# Patient Record
Sex: MALE | Race: ASIAN | NOT HISPANIC OR LATINO | URBAN - METROPOLITAN AREA
[De-identification: names, ages, dates, MRNs, and addresses within clinical notes are randomized per-mention and may not be internally consistent; named-entity substitution may affect disease eponyms.]

---

## 2021-01-21 ENCOUNTER — OUTPATIENT (OUTPATIENT)
Dept: OUTPATIENT SERVICES | Facility: HOSPITAL | Age: 66
LOS: 1 days | End: 2021-01-21
Payer: MEDICAID

## 2021-01-21 DIAGNOSIS — Z20.828 CONTACT WITH AND (SUSPECTED) EXPOSURE TO OTHER VIRAL COMMUNICABLE DISEASES: ICD-10-CM

## 2021-01-21 LAB — SARS-COV-2 RNA SPEC QL NAA+PROBE: DETECTED

## 2021-01-21 PROCEDURE — C9803: CPT

## 2021-01-21 PROCEDURE — U0005: CPT

## 2021-01-21 PROCEDURE — U0003: CPT

## 2021-01-22 DIAGNOSIS — Z20.828 CONTACT WITH AND (SUSPECTED) EXPOSURE TO OTHER VIRAL COMMUNICABLE DISEASES: ICD-10-CM

## 2021-02-14 ENCOUNTER — OUTPATIENT (OUTPATIENT)
Dept: OUTPATIENT SERVICES | Facility: HOSPITAL | Age: 66
LOS: 1 days | End: 2021-02-14
Payer: MEDICAID

## 2021-02-14 DIAGNOSIS — Z20.828 CONTACT WITH AND (SUSPECTED) EXPOSURE TO OTHER VIRAL COMMUNICABLE DISEASES: ICD-10-CM

## 2021-02-14 LAB — SARS-COV-2 RNA SPEC QL NAA+PROBE: DETECTED

## 2021-02-14 PROCEDURE — C9803: CPT

## 2021-02-14 PROCEDURE — U0003: CPT

## 2021-02-14 PROCEDURE — U0005: CPT

## 2021-02-15 DIAGNOSIS — Z20.828 CONTACT WITH AND (SUSPECTED) EXPOSURE TO OTHER VIRAL COMMUNICABLE DISEASES: ICD-10-CM

## 2021-02-21 ENCOUNTER — OUTPATIENT (OUTPATIENT)
Dept: OUTPATIENT SERVICES | Facility: HOSPITAL | Age: 66
LOS: 1 days | End: 2021-02-21
Payer: MEDICAID

## 2021-02-21 DIAGNOSIS — Z20.828 CONTACT WITH AND (SUSPECTED) EXPOSURE TO OTHER VIRAL COMMUNICABLE DISEASES: ICD-10-CM

## 2021-02-21 LAB — SARS-COV-2 RNA SPEC QL NAA+PROBE: SIGNIFICANT CHANGE UP

## 2021-02-21 PROCEDURE — C9803: CPT

## 2021-02-21 PROCEDURE — U0003: CPT

## 2021-02-21 PROCEDURE — U0005: CPT

## 2021-02-22 DIAGNOSIS — Z20.828 CONTACT WITH AND (SUSPECTED) EXPOSURE TO OTHER VIRAL COMMUNICABLE DISEASES: ICD-10-CM

## 2021-03-07 ENCOUNTER — OUTPATIENT (OUTPATIENT)
Dept: OUTPATIENT SERVICES | Facility: HOSPITAL | Age: 66
LOS: 1 days | End: 2021-03-07
Payer: MEDICAID

## 2021-03-07 DIAGNOSIS — Z20.828 CONTACT WITH AND (SUSPECTED) EXPOSURE TO OTHER VIRAL COMMUNICABLE DISEASES: ICD-10-CM

## 2021-03-07 LAB — SARS-COV-2 RNA SPEC QL NAA+PROBE: SIGNIFICANT CHANGE UP

## 2021-03-07 PROCEDURE — U0003: CPT

## 2021-03-07 PROCEDURE — C9803: CPT

## 2021-03-07 PROCEDURE — U0005: CPT

## 2021-03-08 DIAGNOSIS — Z20.828 CONTACT WITH AND (SUSPECTED) EXPOSURE TO OTHER VIRAL COMMUNICABLE DISEASES: ICD-10-CM

## 2022-02-17 ENCOUNTER — INPATIENT (INPATIENT)
Facility: HOSPITAL | Age: 67
LOS: 4 days | Discharge: HOME | DRG: 673 | End: 2022-02-22
Attending: INTERNAL MEDICINE | Admitting: INTERNAL MEDICINE
Payer: MEDICAID

## 2022-02-17 VITALS
HEART RATE: 82 BPM | RESPIRATION RATE: 18 BRPM | DIASTOLIC BLOOD PRESSURE: 99 MMHG | WEIGHT: 127.87 LBS | TEMPERATURE: 99 F | OXYGEN SATURATION: 99 % | SYSTOLIC BLOOD PRESSURE: 198 MMHG

## 2022-02-17 DIAGNOSIS — I10 ESSENTIAL (PRIMARY) HYPERTENSION: ICD-10-CM

## 2022-02-17 DIAGNOSIS — E11.9 TYPE 2 DIABETES MELLITUS WITHOUT COMPLICATIONS: ICD-10-CM

## 2022-02-17 DIAGNOSIS — N18.6 END STAGE RENAL DISEASE: ICD-10-CM

## 2022-02-17 DIAGNOSIS — Z29.9 ENCOUNTER FOR PROPHYLACTIC MEASURES, UNSPECIFIED: ICD-10-CM

## 2022-02-17 LAB
ALBUMIN SERPL ELPH-MCNC: 3.4 G/DL — SIGNIFICANT CHANGE UP (ref 3.3–5)
ALBUMIN SERPL ELPH-MCNC: 3.4 G/DL — SIGNIFICANT CHANGE UP (ref 3.3–5)
ALP SERPL-CCNC: 113 U/L — SIGNIFICANT CHANGE UP (ref 40–120)
ALT FLD-CCNC: 11 U/L — LOW (ref 12–78)
ANION GAP SERPL CALC-SCNC: 9 MMOL/L — SIGNIFICANT CHANGE UP (ref 5–17)
APPEARANCE UR: CLEAR — SIGNIFICANT CHANGE UP
APTT BLD: 34.4 SEC — SIGNIFICANT CHANGE UP (ref 27.5–35.5)
AST SERPL-CCNC: 8 U/L — LOW (ref 15–37)
BASOPHILS # BLD AUTO: 0.05 K/UL — SIGNIFICANT CHANGE UP (ref 0–0.2)
BASOPHILS NFR BLD AUTO: 0.3 % — SIGNIFICANT CHANGE UP (ref 0–2)
BILIRUB SERPL-MCNC: 0.4 MG/DL — SIGNIFICANT CHANGE UP (ref 0.2–1.2)
BILIRUB UR-MCNC: NEGATIVE — SIGNIFICANT CHANGE UP
BUN SERPL-MCNC: 51 MG/DL — HIGH (ref 7–23)
CALCIUM SERPL-MCNC: 9.3 MG/DL — SIGNIFICANT CHANGE UP (ref 8.5–10.1)
CALCIUM SERPL-MCNC: 9.6 MG/DL — SIGNIFICANT CHANGE UP (ref 8.4–10.5)
CHLORIDE SERPL-SCNC: 106 MMOL/L — SIGNIFICANT CHANGE UP (ref 96–108)
CO2 SERPL-SCNC: 21 MMOL/L — LOW (ref 22–31)
COLOR SPEC: YELLOW — SIGNIFICANT CHANGE UP
CREAT SERPL-MCNC: 8.4 MG/DL — HIGH (ref 0.5–1.3)
DIFF PNL FLD: ABNORMAL
EOSINOPHIL # BLD AUTO: 0.27 K/UL — SIGNIFICANT CHANGE UP (ref 0–0.5)
EOSINOPHIL NFR BLD AUTO: 1.8 % — SIGNIFICANT CHANGE UP (ref 0–6)
FERRITIN SERPL-MCNC: 522 NG/ML — HIGH (ref 30–400)
GLUCOSE SERPL-MCNC: 148 MG/DL — HIGH (ref 70–99)
GLUCOSE UR QL: 50 MG/DL
HCT VFR BLD CALC: 31.4 % — LOW (ref 39–50)
HGB BLD-MCNC: 10 G/DL — LOW (ref 13–17)
IMM GRANULOCYTES NFR BLD AUTO: 0.7 % — SIGNIFICANT CHANGE UP (ref 0–1.5)
INR BLD: 1.04 RATIO — SIGNIFICANT CHANGE UP (ref 0.88–1.16)
KETONES UR-MCNC: NEGATIVE — SIGNIFICANT CHANGE UP
LEUKOCYTE ESTERASE UR-ACNC: ABNORMAL
LYMPHOCYTES # BLD AUTO: 22.7 % — SIGNIFICANT CHANGE UP (ref 13–44)
LYMPHOCYTES # BLD AUTO: 3.37 K/UL — HIGH (ref 1–3.3)
MAGNESIUM SERPL-MCNC: 3.7 MG/DL — HIGH (ref 1.6–2.6)
MCHC RBC-ENTMCNC: 29.2 PG — SIGNIFICANT CHANGE UP (ref 27–34)
MCHC RBC-ENTMCNC: 31.8 GM/DL — LOW (ref 32–36)
MCV RBC AUTO: 91.8 FL — SIGNIFICANT CHANGE UP (ref 80–100)
MONOCYTES # BLD AUTO: 1.24 K/UL — HIGH (ref 0–0.9)
MONOCYTES NFR BLD AUTO: 8.4 % — SIGNIFICANT CHANGE UP (ref 2–14)
NEUTROPHILS # BLD AUTO: 9.79 K/UL — HIGH (ref 1.8–7.4)
NEUTROPHILS NFR BLD AUTO: 66.1 % — SIGNIFICANT CHANGE UP (ref 43–77)
NITRITE UR-MCNC: NEGATIVE — SIGNIFICANT CHANGE UP
NRBC # BLD: 0 /100 WBCS — SIGNIFICANT CHANGE UP (ref 0–0)
PH UR: 6 — SIGNIFICANT CHANGE UP (ref 5–8)
PHOSPHATE SERPL-MCNC: 4.8 MG/DL — HIGH (ref 2.5–4.5)
PLATELET # BLD AUTO: 479 K/UL — HIGH (ref 150–400)
POTASSIUM SERPL-MCNC: 5.7 MMOL/L — HIGH (ref 3.5–5.3)
POTASSIUM SERPL-SCNC: 5.7 MMOL/L — HIGH (ref 3.5–5.3)
PROT SERPL-MCNC: 8.4 G/DL — HIGH (ref 6–8.3)
PROT UR-MCNC: 100
PROTHROM AB SERPL-ACNC: 12.1 SEC — SIGNIFICANT CHANGE UP (ref 10.6–13.6)
PTH-INTACT FLD-MCNC: 56 PG/ML — SIGNIFICANT CHANGE UP (ref 15–65)
RBC # BLD: 3.42 M/UL — LOW (ref 4.2–5.8)
RBC # FLD: 16.8 % — HIGH (ref 10.3–14.5)
SARS-COV-2 RNA SPEC QL NAA+PROBE: SIGNIFICANT CHANGE UP
SODIUM SERPL-SCNC: 136 MMOL/L — SIGNIFICANT CHANGE UP (ref 135–145)
SP GR SPEC: 1.01 — SIGNIFICANT CHANGE UP (ref 1.01–1.02)
UROBILINOGEN FLD QL: NEGATIVE — SIGNIFICANT CHANGE UP
WBC # BLD: 14.83 K/UL — HIGH (ref 3.8–10.5)
WBC # FLD AUTO: 14.83 K/UL — HIGH (ref 3.8–10.5)

## 2022-02-17 PROCEDURE — 77001 FLUOROGUIDE FOR VEIN DEVICE: CPT | Mod: 26

## 2022-02-17 PROCEDURE — 76937 US GUIDE VASCULAR ACCESS: CPT | Mod: 26

## 2022-02-17 PROCEDURE — 99285 EMERGENCY DEPT VISIT HI MDM: CPT

## 2022-02-17 PROCEDURE — 71046 X-RAY EXAM CHEST 2 VIEWS: CPT | Mod: 26

## 2022-02-17 PROCEDURE — 76770 US EXAM ABDO BACK WALL COMP: CPT | Mod: 26

## 2022-02-17 PROCEDURE — 93010 ELECTROCARDIOGRAM REPORT: CPT

## 2022-02-17 PROCEDURE — 93970 EXTREMITY STUDY: CPT | Mod: 26

## 2022-02-17 PROCEDURE — 36556 INSERT NON-TUNNEL CV CATH: CPT

## 2022-02-17 RX ORDER — INSULIN LISPRO 100/ML
VIAL (ML) SUBCUTANEOUS EVERY 6 HOURS
Refills: 0 | Status: DISCONTINUED | OUTPATIENT
Start: 2022-02-17 | End: 2022-02-17

## 2022-02-17 RX ORDER — ONDANSETRON 8 MG/1
4 TABLET, FILM COATED ORAL EVERY 8 HOURS
Refills: 0 | Status: DISCONTINUED | OUTPATIENT
Start: 2022-02-17 | End: 2022-02-22

## 2022-02-17 RX ORDER — SODIUM CHLORIDE 9 MG/ML
1000 INJECTION, SOLUTION INTRAVENOUS
Refills: 0 | Status: DISCONTINUED | OUTPATIENT
Start: 2022-02-17 | End: 2022-02-22

## 2022-02-17 RX ORDER — POLYETHYLENE GLYCOL 3350 17 G/17G
17 POWDER, FOR SOLUTION ORAL
Refills: 0 | Status: DISCONTINUED | OUTPATIENT
Start: 2022-02-17 | End: 2022-02-22

## 2022-02-17 RX ORDER — DEXTROSE 50 % IN WATER 50 %
25 SYRINGE (ML) INTRAVENOUS ONCE
Refills: 0 | Status: DISCONTINUED | OUTPATIENT
Start: 2022-02-17 | End: 2022-02-22

## 2022-02-17 RX ORDER — TRAMADOL HYDROCHLORIDE 50 MG/1
50 TABLET ORAL THREE TIMES A DAY
Refills: 0 | Status: DISCONTINUED | OUTPATIENT
Start: 2022-02-17 | End: 2022-02-22

## 2022-02-17 RX ORDER — HYDRALAZINE HCL 50 MG
10 TABLET ORAL ONCE
Refills: 0 | Status: COMPLETED | OUTPATIENT
Start: 2022-02-17 | End: 2022-02-17

## 2022-02-17 RX ORDER — HYDRALAZINE HCL 50 MG
10 TABLET ORAL THREE TIMES A DAY
Refills: 0 | Status: DISCONTINUED | OUTPATIENT
Start: 2022-02-17 | End: 2022-02-22

## 2022-02-17 RX ORDER — LOSARTAN POTASSIUM 100 MG/1
100 TABLET, FILM COATED ORAL DAILY
Refills: 0 | Status: DISCONTINUED | OUTPATIENT
Start: 2022-02-17 | End: 2022-02-22

## 2022-02-17 RX ORDER — INSULIN LISPRO 100/ML
VIAL (ML) SUBCUTANEOUS
Refills: 0 | Status: DISCONTINUED | OUTPATIENT
Start: 2022-02-17 | End: 2022-02-22

## 2022-02-17 RX ORDER — ALTEPLASE 100 MG
2 KIT INTRAVENOUS ONCE
Refills: 0 | Status: COMPLETED | OUTPATIENT
Start: 2022-02-17 | End: 2022-02-17

## 2022-02-17 RX ORDER — PANTOPRAZOLE SODIUM 20 MG/1
40 TABLET, DELAYED RELEASE ORAL
Refills: 0 | Status: DISCONTINUED | OUTPATIENT
Start: 2022-02-17 | End: 2022-02-22

## 2022-02-17 RX ORDER — AMLODIPINE BESYLATE 2.5 MG/1
5 TABLET ORAL DAILY
Refills: 0 | Status: DISCONTINUED | OUTPATIENT
Start: 2022-02-17 | End: 2022-02-17

## 2022-02-17 RX ORDER — AMLODIPINE BESYLATE 2.5 MG/1
10 TABLET ORAL DAILY
Refills: 0 | Status: DISCONTINUED | OUTPATIENT
Start: 2022-02-17 | End: 2022-02-22

## 2022-02-17 RX ORDER — DEXTROSE 50 % IN WATER 50 %
12.5 SYRINGE (ML) INTRAVENOUS ONCE
Refills: 0 | Status: DISCONTINUED | OUTPATIENT
Start: 2022-02-17 | End: 2022-02-22

## 2022-02-17 RX ORDER — HEPARIN SODIUM 5000 [USP'U]/ML
5000 INJECTION INTRAVENOUS; SUBCUTANEOUS EVERY 12 HOURS
Refills: 0 | Status: DISCONTINUED | OUTPATIENT
Start: 2022-02-18 | End: 2022-02-22

## 2022-02-17 RX ORDER — ACETAMINOPHEN 500 MG
650 TABLET ORAL EVERY 6 HOURS
Refills: 0 | Status: DISCONTINUED | OUTPATIENT
Start: 2022-02-17 | End: 2022-02-22

## 2022-02-17 RX ORDER — DEXTROSE 50 % IN WATER 50 %
15 SYRINGE (ML) INTRAVENOUS ONCE
Refills: 0 | Status: DISCONTINUED | OUTPATIENT
Start: 2022-02-17 | End: 2022-02-22

## 2022-02-17 RX ORDER — AMLODIPINE BESYLATE 2.5 MG/1
5 TABLET ORAL ONCE
Refills: 0 | Status: COMPLETED | OUTPATIENT
Start: 2022-02-17 | End: 2022-02-17

## 2022-02-17 RX ORDER — METOPROLOL TARTRATE 50 MG
5 TABLET ORAL ONCE
Refills: 0 | Status: COMPLETED | OUTPATIENT
Start: 2022-02-17 | End: 2022-02-17

## 2022-02-17 RX ORDER — LANOLIN ALCOHOL/MO/W.PET/CERES
3 CREAM (GRAM) TOPICAL AT BEDTIME
Refills: 0 | Status: DISCONTINUED | OUTPATIENT
Start: 2022-02-17 | End: 2022-02-22

## 2022-02-17 RX ORDER — GLUCAGON INJECTION, SOLUTION 0.5 MG/.1ML
1 INJECTION, SOLUTION SUBCUTANEOUS ONCE
Refills: 0 | Status: DISCONTINUED | OUTPATIENT
Start: 2022-02-17 | End: 2022-02-22

## 2022-02-17 RX ORDER — INSULIN LISPRO 100/ML
VIAL (ML) SUBCUTANEOUS AT BEDTIME
Refills: 0 | Status: DISCONTINUED | OUTPATIENT
Start: 2022-02-17 | End: 2022-02-22

## 2022-02-17 RX ADMIN — Medication 10 MILLIGRAM(S): at 13:41

## 2022-02-17 RX ADMIN — AMLODIPINE BESYLATE 5 MILLIGRAM(S): 2.5 TABLET ORAL at 20:17

## 2022-02-17 NOTE — ED PROVIDER NOTE - ATTENDING CONTRIBUTION TO CARE
66 male ESRD on HD M-W-F, recent travel from [place], sent to ER by renal Dr. Yoder for dialysis, patient feels well, no acute distress, last dialysis was on Monday, patient tabitha speaking, family member at the bedside, translates, lungs clear, no pedal edema, right neck access in place, f/u labs, ekg, chest xray, call Dr. Chin, admit.

## 2022-02-17 NOTE — H&P ADULT - ASSESSMENT
Patient  is a 65yo male with pmhx of DM, HTN, ANEMIA, CKD STAGE V ON DIALYSIS TWICE WEEKLY ( last session on tuesday in [facility]) , S/P PNA/SEPSIS 12/21 IN [place], METABOLIC ACIDOSIS SINCE RESOLVED presents to ED as per Dr Yoder referral  for dialysis , pt was recently diagnosed with CKD in 12/21 while living in [facility]. pt at this time had a Cr of 9 and was placed on dialysis twice weekly (last time 2/15 [facility] [facility] [facility] . pt had repeat labs prior ot leaving [facility] showing cr of 7.8. pt currently reports bilateral  le pain with leg swelling and perkins . pt recently took a flight from [facility] which took aprox ~30 hours. pt without other complaints. As per family  prior to moving back pt was taken off metformin for dm due to renal dysfunction. Patient makes urine.  pt denies fever, cp, sob, n/v/d, abd pain. pt is not currently covid vaccinated but would like to receive the vaccine when stable .Patient developed HTN with  in ER and cardiology eval was requested .Norvasc started by  and HD session ordered , IR procedure ordered and Rt IJ  non tunneled dialysis catheter placed, tip in svc .Admitted to med floor for further management .

## 2022-02-17 NOTE — H&P ADULT - PROBLEM SELECTOR PLAN 3
Accuchecks monitoring and insulin corrective regimen  sliding scale coverage with short acting inslulin, add longacting insulin as needed ,no concentrated sweets diet, serial labs ,HbA1C,education

## 2022-02-17 NOTE — H&P ADULT - RS GEN PE MLT RESP DETAILS PC
normal/airway patent/breath sounds equal/good air movement/respirations non-labored/diminished breath sounds, L/diminished breath sounds, R

## 2022-02-17 NOTE — CONSULT NOTE ADULT - ASSESSMENT
RAS ARMAS 66 M 2/17/2022 1955 DR ARACELI PAYTON     Initial evaluation/Pulmonary Critical Care consultation requested on 2/17/2022  by Dr Rashid   from Dr Pryor   Patient examined chart reviewed    HOSPITAL ADMISSION   PATIENT CAME  FROM (if information available)      RAS ARMAS 66 M 2/17/2022 1955 DR ARACELI PAYTON     REVIEW OF SYMPTOMS      Able to give ROS  Yes     RELIABLE +/-   CONSTITUTIONAL Weakness Yes  Chills No   ENDOCRINE  No heat or cold intolerance    ALLERGY No hives  Sore throat No stridor  RESP Coughing blood no  Shortness of breath YES   NEURO No Headache  Confusion Pain neck No   CARDIAC No Chest pain No Palpitations   GI  Pain abdomen NO   Vomiting NO     PHYSICAL EXAM    HEENT Unremarkable  atraumatic   RESP Fair air entry EXP prolonged    Harsh breath sound Resp distres mild   CARDIAC S1 S2 No S3     NO JVD    ABDOMEN SOFT BS PRESENT NOT DISTENDED No hepatosplenomegaly PEDAL EDEMA present No calf tenderness  NO rash       PATIENT PRESENTATION.  66y old Male with renal failure s/p dialysis catheter placement in [place]. Pt presents for new dialysis catheter placement since the current catheter is non usable per nephrology. Pt with elevated wbc count and will have non tunneled dialysis catheter placed by IR   Pt co shortness of breath and PUlm consulted 2/17/2022      DOA/CC/PROBLEMS poa .  2/17/2022 66 m   BL LE pain shortness of breath HD patient     PMH-PSH .  pmh  DM  pmh  Hytn   pmh CKD 5 on HD started 12/2021   pmhPneumonia sepsis 12/2021 while in [facility]   pmh     PROBLEMS.  SHORTNESS OF BREATH poa 2/17/2022  ESRD hd dependent since 12/2021   Rij Temporary HD cath placed 2/17/2022   Anemia poa     MISC ISSUES.                       COVID  STATUS.           ICU STAY. none  GOC.  2/17/2022 full code     BEST PRACTICE ISSUES.                                                  HEAD OF BED ELEVATION. Yes  DVT PROPHYLAXIS. 2/17/2022 hpsc       BENITEZ PROPHYLAXIS. 2/17/2022 protonix 40                                                                                        DIET.   2/17/2022 cons carb renal     GENERAL ISSUES .                                       ALLERGY.      nka                      WEIGHT.         2/17/2022 58                           BMI.            VITALS/PO/IO/VENT/DRIPS.     2/17/2022 afeb 96 150/90         ASSESSMENT/RECOMMENDATIONS.    HEMODYNAMICS.   Monitor bp Target MAP 65 (+)    RESP.   Monitor po Target po 90-95%    OXYGEN REQUIREMENTS.  2/17/2022 ra 97%  Will need home oxygen at discharge if ra rest or ra exercise PO drops below 88%      PMH/PSH issues  were addressed.  Management continued/adjusted as indicated       ABG.   Check prn    EKG.   2/17/2022 nsr    RO DVT  2/17/2022 v duplx (-)     SHORTNESS OF BREATH poa 2/17/2022   likely sec to fluid ol    PROSTH TUBES LINES.  2/17/2022 rij temp HD cath IR    INFECTION  w 2/17/2022 w 14   ua 2/17/2022 w 11-25   id on case    ANEMIA   Hb 2/17/2022 Hb 10   monitor        TIME SPENT   Over 55 minutes aggregate care time spent on encounter; activities included   direct patient care, counseling and/or coordinating care reviewing notes, lab data/ imaging , discussion with multidisciplinary team/ patient  /family and explaining in detail risks, benefits, alternatives  of the recommendations     RAS ARMAS 66 M 2/17/2022 1955 DR ARACELI PAYTON

## 2022-02-17 NOTE — H&P ADULT - HISTORY OF PRESENT ILLNESS
Patient  is a 67yo male with pmhx of DM, HTN, ANEMIA, CKD STAGE V ON DIALYSIS TWICE WEEKLY ( last session on tuesday in [facility]) , S/P PNA/SEPSIS 12/21 IN [place], METABOLIC ACIDOSIS SINCE RESOLVED presents to ED as per Dr Yoder referral  for dialysis , pt was recently diagnosed with CKD in 12/21 while living in [facility]. pt at this time had a Cr of 9 and was placed on dialysis twice weekly (last time 2/15 [facility] [place] . pt had repeat labs prior ot leaving [facility] showing cr of 7.8. pt currently reports bilateral  le pain with leg swelling and perkins . pt recently took a flight from [facility] which took aprox ~30 hours. pt without other complaints. As per family  prior to moving back pt was taken off metformin for dm due to renal dysfunction. Patient makes urine.  pt denies fever, cp, sob, n/v/d, abd pain. pt is not currently covid vaccinated but would like to receive the vaccine when stable .Patient developed HTN with  in ER and cardiology eval was requested .Norvasc started by  and HD session ordered , IR procedure ordered and Rt IJ  non tunneled dialysis catheter placed, tip in svc .Admitted to med floor for further management .

## 2022-02-17 NOTE — ED ADULT NURSE NOTE - OBJECTIVE STATEMENT
Pt received in received in bed alert and oriented and resting in bed. Pt just new dialysis patient from [place] on Tuesday). Pt has dialysis access in right side neck. As per Md's orders Iv froilan placed blood specimen obtained and sent to the lab. Pt now being prepped for dialysis. Spoke with dialysis RN along with interventional RN Artie. Pt was scheduled to go to interventional for new dialysis access but pt BP elevated 199/103. Communicated finding with Bakari Alva and Dr. Rob. Was told to address BP 1st. Page went out for Dr. Rashid and niyah Chin. Awaiting callback. Pt stable and no sign of distress noted.

## 2022-02-17 NOTE — CONSULT NOTE ADULT - SUBJECTIVE AND OBJECTIVE BOX
CARDIOLOGY CONSULT NOTE    Patient is a 66y Male with a known history of : pt with esrd - on hd since december 2021  DM (diabetes mellitus) [E11.9]    HTN (hypertension) [I10]    ESRD on dialysis [N18.6]    Prophylactic measure [Z29.9]      HPI:  Patient  is a 65yo male with pmhx of DM, HTN, ANEMIA, CKD STAGE V ON DIALYSIS TWICE WEEKLY ( last session on tuesday in [facility]) , S/P PNA/SEPSIS 12/21 IN [facility], METABOLIC ACIDOSIS SINCE RESOLVED presents to ED as per Dr Yoder referral  for dialysis , pt was recently diagnosed with CKD in 12/21 while living in [facility]. pt at this time had a Cr of 9 and was placed on dialysis twice weekly (last time 2/15 [place] [facility] [facility] . pt had repeat labs prior ot leaving [facility] showing cr of 7.8. pt currently reports bilateral  le pain with leg swelling and perkins . pt recently took a flight from [facility] which took aprox ~30 hours. pt without other complaints. As per family  prior to moving back pt was taken off metformin for dm due to renal dysfunction. Patient makes urine.  pt denies fever, cp, sob, n/v/d, abd pain. pt is not currently covid vaccinated but would like to receive the vaccine when stable .Patient developed HTN with  in ER and cardiology eval was requested .Norvasc started by  and HD session ordered , IR procedure ordered and Rt IJ  non tunneled dialysis catheter placed, tip in svc .Admitted to med floor for further management . (17 Feb 2022 17:25)      REVIEW OF SYSTEMS:    CONSTITUTIONAL: No fever, weight loss, or fatigue  EYES: No eye pain, visual disturbances, or discharge  ENMT:  No difficulty hearing, tinnitus, vertigo; No sinus or throat pain  NECK: No pain or stiffness  BREASTS: No pain, masses, or nipple discharge  RESPIRATORY: No cough, wheezing, chills or hemoptysis; No shortness of breath  CARDIOVASCULAR: No chest pain, palpitations, dizziness, or leg swelling  GASTROINTESTINAL: No abdominal or epigastric pain. No nausea, vomiting, or hematemesis; No diarrhea or constipation. No melena or hematochezia.  GENITOURINARY: No dysuria, frequency, hematuria, or incontinence  NEUROLOGICAL: No headaches, memory loss, loss of strength, numbness, or tremors  SKIN: No itching, burning, rashes, or lesions   LYMPH NODES: No enlarged glands  ENDOCRINE: No heat or cold intolerance; No hair loss  MUSCULOSKELETAL: No joint pain or swelling; No muscle, back, or extremity pain  PSYCHIATRIC: No depression, anxiety, mood swings, or difficulty sleeping  HEME/LYMPH: No easy bruising, or bleeding gums  ALLERGY AND IMMUNOLOGIC: No hives or eczema    MEDICATIONS  (STANDING):  alteplase for catheter clearance 2 milliGRAM(s) Catheter once  alteplase for catheter clearance 2 milliGRAM(s) Catheter once  amLODIPine   Tablet 5 milliGRAM(s) Oral daily  amLODIPine   Tablet 5 milliGRAM(s) Oral once  dextrose 40% Gel 15 Gram(s) Oral once  dextrose 5%. 1000 milliLiter(s) (50 mL/Hr) IV Continuous <Continuous>  dextrose 5%. 1000 milliLiter(s) (100 mL/Hr) IV Continuous <Continuous>  dextrose 50% Injectable 25 Gram(s) IV Push once  dextrose 50% Injectable 12.5 Gram(s) IV Push once  dextrose 50% Injectable 25 Gram(s) IV Push once  glucagon  Injectable 1 milliGRAM(s) IntraMuscular once  insulin lispro (ADMELOG) corrective regimen sliding scale   SubCutaneous three times a day before meals  insulin lispro (ADMELOG) corrective regimen sliding scale   SubCutaneous at bedtime  pantoprazole    Tablet 40 milliGRAM(s) Oral before breakfast    MEDICATIONS  (PRN):  acetaminophen     Tablet .. 650 milliGRAM(s) Oral every 6 hours PRN Temp greater or equal to 38C (100.4F), Mild Pain (1 - 3)  aluminum hydroxide/magnesium hydroxide/simethicone Suspension 30 milliLiter(s) Oral every 4 hours PRN Dyspepsia  hydrALAZINE 10 milliGRAM(s) Oral three times a day PRN Systolic blood pressure > 170  melatonin 3 milliGRAM(s) Oral at bedtime PRN Insomnia  ondansetron Injectable 4 milliGRAM(s) IV Push every 8 hours PRN Nausea and/or Vomiting  polyethylene glycol 3350 17 Gram(s) Oral two times a day PRN Constipation  traMADol 50 milliGRAM(s) Oral three times a day PRN Moderate Pain (4 - 6)      ALLERGIES: No Known Allergies      Social History:     FAMILY HISTORY:      PAST MEDICAL & SURGICAL HISTORY:  DM (diabetes mellitus)    HTN (hypertension)    Chronic kidney disease, unspecified CKD stage    ESRD on dialysis    Pneumonia    Sepsis    Anemia    No significant past surgical history          PHYSICAL EXAMINATION:  -----------------------------  T(C): 36.4 (02-17-22 @ 15:11), Max: 37 (02-17-22 @ 09:07)  HR: 94 (02-17-22 @ 15:11) (77 - 95)  BP: 174/79 (02-17-22 @ 15:11) (169/84 - 199/103)  RR: 18 (02-17-22 @ 15:11) (16 - 22)  SpO2: 100% (02-17-22 @ 15:11) (98% - 100%)  Wt(kg): --      Weight (kg): 58 (02-17 @ 09:07)    Constitutional: well developed, normal appearance, well groomed, well nourished, no deformities and no acute distress.   Eyes: the conjunctiva exhibited no abnormalities and the eyelids demonstrated no xanthelasmas.   HEENT: normal oral mucosa, no oral pallor and no oral cyanosis.   Neck: normal jugular venous A waves present, normal jugular venous V waves present and no jugular venous peraza A waves.   Pulmonary: no respiratory distress, normal respiratory rhythm and effort, no accessory muscle use and lungs were clear to auscultation bilaterally.   Cardiovascular: heart rate and rhythm were normal, normal S1 and S2 and no murmur, gallop, rub, heave or thrill are present.   Abdomen: soft, non-tender, no hepato-splenomegaly and no abdominal mass palpated.   Musculoskeletal: the gait could not be assessed..   Extremities: no clubbing of the fingernails, no localized cyanosis, no petechial hemorrhages and no ischemic changes.   Skin: normal skin color and pigmentation, no rash, no venous stasis, no skin lesions, no skin ulcer and no xanthoma was observed.   Psychiatric: oriented to person, place, and time, the affect was normal, the mood was normal and not feeling anxious.     LABS:   --------  02-17    136  |  106  |  51<H>  ----------------------------<  148<H>  5.7<H>   |  21<L>  |  8.40<H>    Ca    9.3      17 Feb 2022 11:32  Phos  4.8     02-17  Mg     3.7     02-17    TPro  8.4<H>  /  Alb  3.4  /  TBili  0.4  /  DBili  x   /  AST  8<L>  /  ALT  11<L>  /  AlkPhos  113  02-17                         10.0   14.83 )-----------( 479      ( 17 Feb 2022 11:32 )             31.4     PT/INR - ( 17 Feb 2022 11:32 )   PT: 12.1 sec;   INR: 1.04 ratio         PTT - ( 17 Feb 2022 11:32 )  PTT:34.4 sec            RADIOLOGY:  -----------------        ECG:     ECHO

## 2022-02-17 NOTE — PATIENT PROFILE ADULT - FALL HARM RISK - UNIVERSAL INTERVENTIONS
Bed in lowest position, wheels locked, appropriate side rails in place/Call bell, personal items and telephone in reach/Instruct patient to call for assistance before getting out of bed or chair/Non-slip footwear when patient is out of bed/[place] to call system/Physically safe environment - no spills, clutter or unnecessary equipment/Purposeful Proactive Rounding/Room/bathroom lighting operational, light cord in reach

## 2022-02-17 NOTE — ED PROVIDER NOTE - NSICDXPASTMEDICALHX_GEN_ALL_CORE_FT
PAST MEDICAL HISTORY:  Chronic kidney disease, unspecified CKD stage     DM (diabetes mellitus)     HTN (hypertension)

## 2022-02-17 NOTE — CONSULT NOTE ADULT - SUBJECTIVE AND OBJECTIVE BOX
Patient is a 66y Male whom presented to the hospital with     PAST MEDICAL & SURGICAL HISTORY:  DM (diabetes mellitus)    HTN (hypertension)    Chronic kidney disease, unspecified CKD stage    ESRD on dialysis    Pneumonia    Sepsis    Anemia    No significant past surgical history        MEDICATIONS  (STANDING):  amLODIPine   Tablet 5 milliGRAM(s) Oral daily  amLODIPine   Tablet 5 milliGRAM(s) Oral once  dextrose 40% Gel 15 Gram(s) Oral once  dextrose 5%. 1000 milliLiter(s) (50 mL/Hr) IV Continuous <Continuous>  dextrose 5%. 1000 milliLiter(s) (100 mL/Hr) IV Continuous <Continuous>  dextrose 50% Injectable 25 Gram(s) IV Push once  dextrose 50% Injectable 12.5 Gram(s) IV Push once  dextrose 50% Injectable 25 Gram(s) IV Push once  glucagon  Injectable 1 milliGRAM(s) IntraMuscular once  insulin lispro (ADMELOG) corrective regimen sliding scale   SubCutaneous three times a day before meals  insulin lispro (ADMELOG) corrective regimen sliding scale   SubCutaneous at bedtime  pantoprazole    Tablet 40 milliGRAM(s) Oral before breakfast      Allergies    No Known Allergies    Intolerances        SOCIAL HISTORY:  Denies ETOh,Smoking,     FAMILY HISTORY:      REVIEW OF SYSTEMS:    CONSTITUTIONAL: No weakness, fevers or chills  EYES/ENT: No visual changes;  no throat pain   NECK: No pain or stiffness  RESPIRATORY: No cough, wheezing, hemoptysis; No shortness of breath  CARDIOVASCULAR: No chest pain or palpitations  GASTROINTESTINAL: No abdominal or epigastric pain. No nausea, vomiting,     No diarrhea or constipation. No melena   GENITOURINARY: No dysuria, frequency or hematuria  NEUROLOGICAL: No numbness or weakness  SKIN: dry      VITAL:  T(C): , Max: 37 (22 @ 09:07)  T(F): , Max: 98.6 (22 @ 09:07)  HR: 99 (22 @ 17:40)  BP: 163/84 (22 @ 17:40)  BP(mean): --  RR: 16 (22 @ 17:40)  SpO2: 99% (22 @ 17:40)  Wt(kg): --    I and O's:      Weight (kg): 58 ( @ 09:07)    PHYSICAL EXAM:    Constitutional: NAD  HEENT: conjunctive   clear   Neck:  No JVD  Respiratory: CTAB  Cardiovascular: S1 and S2  Gastrointestinal: BS+, soft, NT/ND  Extremities: No peripheral edema  Neurological: A/O x 3, no focal deficits  Psychiatric: Normal mood, normal affect  : No [place]  Skin: No rashes  Access: Not applicable    LABS:                        10.0   14.83 )-----------( 479      ( 2022 11:32 )             31.4         136  |  106  |  51<H>  ----------------------------<  148<H>  5.7<H>   |  21<L>  |  8.40<H>    Ca    9.3      2022 11:32  Phos  4.8       Mg     3.7         TPro  8.4<H>  /  Alb  3.4  /  TBili  0.4  /  DBili  x   /  AST  8<L>  /  ALT  11<L>  /  AlkPhos  113        Urine Studies:  Urinalysis Basic - ( 2022 11:32 )    Color: Yellow / Appearance: Clear / S.010 / pH: x  Gluc: x / Ketone: Negative  / Bili: Negative / Urobili: Negative   Blood: x / Protein: 100 / Nitrite: Negative   Leuk Esterase: Trace / RBC: 0-2 /HPF / WBC 11-25   Sq Epi: x / Non Sq Epi: Occasional / Bacteria: Few            RADIOLOGY & ADDITIONAL STUDIES:                   Patient is a 66y Male whom presented to the hospital with esrd on hd     PAST MEDICAL & SURGICAL HISTORY:  DM (diabetes mellitus)    HTN (hypertension)    Chronic kidney disease, unspecified CKD stage    ESRD on dialysis    Pneumonia    Sepsis    Anemia    No significant past surgical history        MEDICATIONS  (STANDING):  amLODIPine   Tablet 5 milliGRAM(s) Oral daily  amLODIPine   Tablet 5 milliGRAM(s) Oral once  dextrose 40% Gel 15 Gram(s) Oral once  dextrose 5%. 1000 milliLiter(s) (50 mL/Hr) IV Continuous <Continuous>  dextrose 5%. 1000 milliLiter(s) (100 mL/Hr) IV Continuous <Continuous>  dextrose 50% Injectable 25 Gram(s) IV Push once  dextrose 50% Injectable 12.5 Gram(s) IV Push once  dextrose 50% Injectable 25 Gram(s) IV Push once  glucagon  Injectable 1 milliGRAM(s) IntraMuscular once  insulin lispro (ADMELOG) corrective regimen sliding scale   SubCutaneous three times a day before meals  insulin lispro (ADMELOG) corrective regimen sliding scale   SubCutaneous at bedtime  pantoprazole    Tablet 40 milliGRAM(s) Oral before breakfast      Allergies    No Known Allergies    Intolerances        SOCIAL HISTORY:  Denies ETOh,Smoking,     FAMILY HISTORY:      REVIEW OF SYSTEMS:    CONSTITUTIONAL: No weakness, fevers or chills  RESPIRATORY: No cough, wheezing, hemoptysis; No shortness of breath  CARDIOVASCULAR: No chest pain or palpitations  GASTROINTESTINAL: No abdominal or epigastric pain. No nausea, vomiting,     No diarrhea or constipation. No melena       VITAL:  T(C): , Max: 37 (22 @ 09:07)  T(F): , Max: 98.6 (22 @ 09:07)  HR: 99 (22 @ 17:40)  BP: 163/84 (22 @ 17:40)  BP(mean): --  RR: 16 (22 @ 17:40)  SpO2: 99% (22 @ 17:40)  Wt(kg): --    I and O's:      Weight (kg): 58 ( @ 09:07)    PHYSICAL EXAM:    Constitutional: NAD  HEENT: conjunctive   clear   Neck:  No JVD  Respiratory: CTAB  Cardiovascular: S1 and S2  Gastrointestinal: BS+, soft, NT/ND  Extremities: No peripheral edema  Neurological: A/O x 3, no focal deficits  Access: perm cath     LABS:                        10.0   14.83 )-----------( 479      ( 2022 11:32 )             31.4         136  |  106  |  51<H>  ----------------------------<  148<H>  5.7<H>   |  21<L>  |  8.40<H>    Ca    9.3      2022 11:32  Phos  4.8       Mg     3.7         TPro  8.4<H>  /  Alb  3.4  /  TBili  0.4  /  DBili  x   /  AST  8<L>  /  ALT  11<L>  /  AlkPhos  113        Urine Studies:  Urinalysis Basic - ( 2022 11:32 )    Color: Yellow / Appearance: Clear / S.010 / pH: x  Gluc: x / Ketone: Negative  / Bili: Negative / Urobili: Negative   Blood: x / Protein: 100 / Nitrite: Negative   Leuk Esterase: Trace / RBC: 0-2 /HPF / WBC 11-25   Sq Epi: x / Non Sq Epi: Occasional / Bacteria: Few            RADIOLOGY & ADDITIONAL STUDIES:

## 2022-02-17 NOTE — H&P ADULT - PROBLEM SELECTOR PLAN 1
IR procedure performed and Rt IJ  non tunneled dialysis catheter placed, tip in svc .HD as per nephrologist orders

## 2022-02-17 NOTE — ED PROVIDER NOTE - OBJECTIVE STATEMENT
pt is a 67yo male with pmhx of DM, HTN, ANEMIA, CKD STAGE V ON DIALYSIS TWICE WEEKLY, S/P PNA/SEPSIS 12/21 IN [place], METABOLIC ACIDOSIS SINCE RESOLVED presents for dialysis. pt son in law used as  per pt request. pt was recently diagnosed with CKD in 12/21 while living in [place]. pt at this time had a Cr of 9 and was placed on dialysis twice weekly (last time 2/15 [place] [facility] 2[place]). [place] returned to [place] a few days ago which daughter consulted dr mason higuera who advised pt to come to ed for eval since pt has not had labs, etc in [place]. pt had repeat labs prior ot leaving [place] showing cr of 7.8. pt currently reports bl le pain with leg swelling. pt recently took a flight from [facility] which took aprox ~30 hours. pt without other complaints.  pt son in law reports pt health has declined over the past year while living in [place]. of note, prior to moving back pt was taken off metformin for dm due to renal dysfunction. pt makes urine.  pt denies fever, cp, sob, n/v/d, abd pain. pt is not currently covid vaccinated but would like to receive the vaccine.  pmd: judy

## 2022-02-17 NOTE — CONSULT NOTE ADULT - ASSESSMENT
Patient  is a 65yo male with pmhx of DM, HTN, ANEMIA, CKD STAGE V ON DIALYSIS TWICE WEEKLY ( last session on tuesday in [facility]) , S/P PNA/SEPSIS 12/21 IN [place], METABOLIC ACIDOSIS SINCE RESOLVED presents to ED as per Dr Yoder referral  for dialysis , pt was recently diagnosed with CKD in 12/21 while living in [facility]. pt at this time had a Cr of 9 and was placed on dialysis twice weekly (last time 2/15 [place]/ 2[place] [facility] [place] . pt had repeat labs prior ot leaving [facility] showing cr of 7.8. pt currently reports bilateral  le pain with leg swelling and perkins . pt recently took a flight from [facility] which took aprox ~30 hours. pt without other complaints. As per family  prior to moving back pt was taken off metformin for dm due to renal dysfunction. Patient makes urine.  pt denies fever, cp, sob, n/v/d, abd pain. pt is not currently covid vaccinated but would like to receive the vaccine when stable .Patient developed HTN with  in ER and cardiology eval was requested .Norvasc started by  and HD session ordered , IR procedure ordered and Rt IJ  non tunneled dialysis catheter placed, tip in svc .Admitted to med floor for further management . (17 Feb 2022 17:25)    esrd on hs   Excess fluids and waste products will be removed from your blood; your electrolytes will be balanced; your blood pressure will be controlled.      ANEMIA PLAN:  Anemia of chronic disease:  Well controlled by Aranesp  H and H subtherapeutic .  We will continue Aranesp aiming for a HCT of 32-36 %.   We will monitor Iron stores, B12 and RBC folate .    BP monitoring,continue current antihypertensive meds, low salt diet,followup with PMD in 1-2 weeks       Patient  is a 67yo male with pmhx of DM, HTN, ANEMIA, CKD STAGE V ON DIALYSIS TWICE WEEKLY ( last session on tuesday in [facility]) , S/P PNA/SEPSIS 12/21 IN [place], METABOLIC ACIDOSIS SINCE RESOLVED presents to ED as per Dr Yoder referral  for dialysis , pt was recently diagnosed with CKD in 12/21 while living in [facility]. pt at this time had a Cr of 9 and was placed on dialysis twice weekly (last time 2/15 [place]/ [facility] [facility] [place] . pt had repeat labs prior ot leaving [facility] showing cr of 7.8. pt currently reports bilateral  le pain with leg swelling and perkins . pt recently took a flight from [facility] which took aprox ~30 hours. pt without other complaints. As per family  prior to moving back pt was taken off metformin for dm due to renal dysfunction. Patient makes urine.  pt denies fever, cp, sob, n/v/d, abd pain. pt is not currently covid vaccinated but would like to receive the vaccine when stable .Patient developed HTN with  in ER and cardiology eval was requested .Norvasc started by  and HD session ordered , IR procedure ordered and Rt IJ  non tunneled dialysis catheter placed, tip in svc .Admitted to med floor for further management . (17 Feb 2022 17:25)    esrd on hd    Excess fluids and waste products will be removed from your blood; your electrolytes will be balanced; your blood pressure will be controlled.      ANEMIA PLAN:  Anemia of chronic disease:  Well controlled by Aranesp  H and H subtherapeutic .  We will continue Aranesp aiming for a HCT of 32-36 %.   We will monitor Iron stores, B12 and RBC folate .    BP monitoring,continue current antihypertensive meds, low salt diet,followup with PMD in 1-2 weeks

## 2022-02-17 NOTE — ED ADULT NURSE REASSESSMENT NOTE - NS ED NURSE REASSESS COMMENT FT1
Pt s/p interventional radiology where right sided dialysis access placed. Dressing dry and intact. Vs taken and charted. Verbal report given to Bakari Olmedo. Pt will be transported to Dialysis via stretcher.

## 2022-02-17 NOTE — ED PROVIDER NOTE - SKIN, MLM
Skin normal color for race, warm, dry and intact. No evidence of rash. + port right upper neck skin intact no erythema

## 2022-02-17 NOTE — PRE PROCEDURE NOTE - PRE PROCEDURE EVALUATION
Interventional Radiology Pre-Procedure Note    Patient is a 66y old Male with renal failure s/p dialysis catheter placement in [place]. Pt presents for new dialysis catheter placement since the current catheter is non usable per nephrology. Pt with elevated wbc count and will have non tunneled dialysis catheter placed.     PAST MEDICAL & SURGICAL HISTORY:  DM (diabetes mellitus)    HTN (hypertension)    Chronic kidney disease, unspecified CKD stage    No significant past surgical history         Allergies: No Known Allergies      LABS:                        10.0   14.83 )-----------( 479      ( 17 Feb 2022 11:32 )             31.4     02-17    136  |  106  |  51<H>  ----------------------------<  148<H>  5.7<H>   |  21<L>  |  8.40<H>    Ca    9.3      17 Feb 2022 11:32  Phos  4.8     02-17  Mg     3.7     02-17    TPro  8.4<H>  /  Alb  3.4  /  TBili  0.4  /  DBili  x   /  AST  8<L>  /  ALT  11<L>  /  AlkPhos  113  02-17    PT/INR - ( 17 Feb 2022 11:32 )   PT: 12.1 sec;   INR: 1.04 ratio         PTT - ( 17 Feb 2022 11:32 )  PTT:34.4 sec    Procedure/ risks/ benefits were explained, informed consent obtained from patient, verbalizes understanding.

## 2022-02-17 NOTE — CONSULT NOTE ADULT - SUBJECTIVE AND OBJECTIVE BOX
Patient is a 66y old  Male who presents with a chief complaint of sent to ED for HD (17 Feb 2022 17:50)      HPI:  Patient  is a 67yo male with pmhx of DM, HTN, ANEMIA, CKD STAGE V ON DIALYSIS TWICE WEEKLY ( last session on tuesday in [facility]) , S/P PNA/SEPSIS 12/21 IN [facility], METABOLIC ACIDOSIS SINCE RESOLVED presents to ED as per Dr Yoder referral  for dialysis , pt was recently diagnosed with CKD in 12/21 while living in [facility]. pt at this time had a Cr of 9 and was placed on dialysis twice weekly (last time 2/15 [facility]/ [facility] [facility] [place] . pt had repeat labs prior ot leaving [facility] showing cr of 7.8. pt currently reports bilateral  le pain with leg swelling and perkins . pt recently took a flight from [facility] which took aprox ~30 hours. pt without other complaints. As per family  prior to moving back pt was taken off metformin for dm due to renal dysfunction. Patient makes urine.  pt denies fever, cp, sob, n/v/d, abd pain. pt is not currently covid vaccinated but would like to receive the vaccine when stable .Patient developed HTN with  in ER and cardiology eval was requested .Norvasc started by  and HD session ordered , IR procedure ordered and Rt IJ  non tunneled dialysis catheter placed, tip in svc .Admitted to med floor for further management . (17 Feb 2022 17:25)      Asked to see patient for ID Consult    PAST MEDICAL & SURGICAL HISTORY:  DM (diabetes mellitus)    HTN (hypertension)    Chronic kidney disease, unspecified CKD stage    ESRD on dialysis    Pneumonia    Sepsis    Anemia    No significant past surgical history        Allergies    No Known Allergies    Intolerances        REVIEW OF SYSTEMS:  All systems below were reviewed and are negative [  ]  HEENT:  ID:  Pulmonary:  Cardiac:  GI:  Renal:  Musculoskeletal:  All other systems above were reviewed and are negative   [  ]    HOME MEDICATIONS:    MEDICATIONS  (STANDING):  amLODIPine   Tablet 5 milliGRAM(s) Oral daily  amLODIPine   Tablet 5 milliGRAM(s) Oral once  dextrose 40% Gel 15 Gram(s) Oral once  dextrose 5%. 1000 milliLiter(s) (50 mL/Hr) IV Continuous <Continuous>  dextrose 5%. 1000 milliLiter(s) (100 mL/Hr) IV Continuous <Continuous>  dextrose 50% Injectable 25 Gram(s) IV Push once  dextrose 50% Injectable 12.5 Gram(s) IV Push once  dextrose 50% Injectable 25 Gram(s) IV Push once  glucagon  Injectable 1 milliGRAM(s) IntraMuscular once  insulin lispro (ADMELOG) corrective regimen sliding scale   SubCutaneous three times a day before meals  insulin lispro (ADMELOG) corrective regimen sliding scale   SubCutaneous at bedtime  pantoprazole    Tablet 40 milliGRAM(s) Oral before breakfast    MEDICATIONS  (PRN):  acetaminophen     Tablet .. 650 milliGRAM(s) Oral every 6 hours PRN Temp greater or equal to 38C (100.4F), Mild Pain (1 - 3)  aluminum hydroxide/magnesium hydroxide/simethicone Suspension 30 milliLiter(s) Oral every 4 hours PRN Dyspepsia  hydrALAZINE 10 milliGRAM(s) Oral three times a day PRN Systolic blood pressure > 170  melatonin 3 milliGRAM(s) Oral at bedtime PRN Insomnia  ondansetron Injectable 4 milliGRAM(s) IV Push every 8 hours PRN Nausea and/or Vomiting  polyethylene glycol 3350 17 Gram(s) Oral two times a day PRN Constipation  traMADol 50 milliGRAM(s) Oral three times a day PRN Moderate Pain (4 - 6)      Vital Signs Last 24 Hrs  T(C): 36.5 (17 Feb 2022 17:40), Max: 37 (17 Feb 2022 09:07)  T(F): 97.7 (17 Feb 2022 17:40), Max: 98.6 (17 Feb 2022 09:07)  HR: 99 (17 Feb 2022 17:40) (77 - 99)  BP: 163/84 (17 Feb 2022 17:40) (163/80 - 199/103)  BP(mean): --  RR: 16 (17 Feb 2022 17:40) (16 - 22)  SpO2: 99% (17 Feb 2022 17:40) (98% - 100%)    PHYSICAL EXAM:  HEENT:  Neck:  Lungs:  Heart:  Abdomen:  Genital/ Rectal:  Extremities:  Neurologic:  Vascular:    I&O's Summary      LABORATORY:                          10.0   14.83 )-----------( 479      ( 17 Feb 2022 11:32 )             31.4           02-17    136  |  106  |  51<H>  ----------------------------<  148<H>  5.7<H>   |  21<L>  |  8.40<H>    Ca    9.3      17 Feb 2022 11:32  Phos  4.8     02-17  Mg     3.7     02-17    TPro  8.4<H>  /  Alb  3.4  /  TBili  0.4  /  DBili  x   /  AST  8<L>  /  ALT  11<L>  /  AlkPhos  113  02-17          LABORATORY:    CBC Full  -  ( 17 Feb 2022 11:32 )  WBC Count : 14.83 K/uL  RBC Count : 3.42 M/uL  Hemoglobin : 10.0 g/dL  Hematocrit : 31.4 %  Platelet Count - Automated : 479 K/uL  Mean Cell Volume : 91.8 fl  Mean Cell Hemoglobin : 29.2 pg  Mean Cell Hemoglobin Concentration : 31.8 gm/dL  Auto Neutrophil # : 9.79 K/uL  Auto Lymphocyte # : 3.37 K/uL  Auto Monocyte # : 1.24 K/uL  Auto Eosinophil # : 0.27 K/uL  Auto Basophil # : 0.05 K/uL  Auto Neutrophil % : 66.1 %  Auto Lymphocyte % : 22.7 %  Auto Monocyte % : 8.4 %  Auto Eosinophil % : 1.8 %  Auto Basophil % : 0.3 %          02-17    136  |  106  |  51<H>  ----------------------------<  148<H>  5.7<H>   |  21<L>  |  8.40<H>    Ca    9.3      17 Feb 2022 11:32  Phos  4.8     02-17  Mg     3.7     02-17    TPro  8.4<H>  /  Alb  3.4  /  TBili  0.4  /  DBili  x   /  AST  8<L>  /  ALT  11<L>  /  AlkPhos  113  02-17    Assessment and Plan:    1. Leukocytosis  2. ESRD on HD.    No active infections noted.   Monitor off antibiotics.    Thank you.                                                Patient is a 66y old  Male who presents with a chief complaint of sent to ED for HD (17 Feb 2022 17:50)        The patient  is a 65 yo male with a pmhx of DM, HTN, ANEMIA, CKD STAGE V ON DIALYSIS TWICE WEEKLY in [place], pneumonia and sepsis in  12/21 in [place] and anemia who was sent to the ED for HD,. He was admitted and had permacath placed by IR today. The patient was noted to have high WBC of 14K in the ED. He has been afebrile and he denied cough, SOB, abdominal pain or diarrhea.          PAST MEDICAL & SURGICAL HISTORY:  DM (diabetes mellitus)    HTN (hypertension)    Chronic kidney disease, unspecified CKD stage    ESRD on dialysis    Pneumonia    Sepsis    Anemia    No significant past surgical history        Allergies    No Known Allergies    Intolerances        REVIEW OF SYSTEMS:  No cough.  No diarrhea.      HOME MEDICATIONS:    MEDICATIONS  (STANDING):  amLODIPine   Tablet 5 milliGRAM(s) Oral daily  amLODIPine   Tablet 5 milliGRAM(s) Oral once  dextrose 40% Gel 15 Gram(s) Oral once  dextrose 5%. 1000 milliLiter(s) (50 mL/Hr) IV Continuous <Continuous>  dextrose 5%. 1000 milliLiter(s) (100 mL/Hr) IV Continuous <Continuous>  dextrose 50% Injectable 25 Gram(s) IV Push once  dextrose 50% Injectable 12.5 Gram(s) IV Push once  dextrose 50% Injectable 25 Gram(s) IV Push once  glucagon  Injectable 1 milliGRAM(s) IntraMuscular once  insulin lispro (ADMELOG) corrective regimen sliding scale   SubCutaneous three times a day before meals  insulin lispro (ADMELOG) corrective regimen sliding scale   SubCutaneous at bedtime  pantoprazole    Tablet 40 milliGRAM(s) Oral before breakfast    MEDICATIONS  (PRN):  acetaminophen     Tablet .. 650 milliGRAM(s) Oral every 6 hours PRN Temp greater or equal to 38C (100.4F), Mild Pain (1 - 3)  aluminum hydroxide/magnesium hydroxide/simethicone Suspension 30 milliLiter(s) Oral every 4 hours PRN Dyspepsia  hydrALAZINE 10 milliGRAM(s) Oral three times a day PRN Systolic blood pressure > 170  melatonin 3 milliGRAM(s) Oral at bedtime PRN Insomnia  ondansetron Injectable 4 milliGRAM(s) IV Push every 8 hours PRN Nausea and/or Vomiting  polyethylene glycol 3350 17 Gram(s) Oral two times a day PRN Constipation  traMADol 50 milliGRAM(s) Oral three times a day PRN Moderate Pain (4 - 6)      Vital Signs Last 24 Hrs  T(C): 36.5 (17 Feb 2022 17:40), Max: 37 (17 Feb 2022 09:07)  T(F): 97.7 (17 Feb 2022 17:40), Max: 98.6 (17 Feb 2022 09:07)  HR: 99 (17 Feb 2022 17:40) (77 - 99)  BP: 163/84 (17 Feb 2022 17:40) (163/80 - 199/103)  BP(mean): --  RR: 16 (17 Feb 2022 17:40) (16 - 22)  SpO2: 99% (17 Feb 2022 17:40) (98% - 100%)    PHYSICAL EXAM:  HEENT: NC/AT  Neck: Soft.  Lungs: Coarse BS bilaterally; no wheezing.   Heart: RRR, no murmurs.   Abdomen: Soft, no tenderness. No masses.   Genital/ Rectal: No griffith.   Extremities: No ulcers,   Neurologic: Awake.      I&O's Summary      LABORATORY:                          10.0   14.83 )-----------( 479      ( 17 Feb 2022 11:32 )             31.4           02-17    136  |  106  |  51<H>  ----------------------------<  148<H>  5.7<H>   |  21<L>  |  8.40<H>    Ca    9.3      17 Feb 2022 11:32  Phos  4.8     02-17  Mg     3.7     02-17    TPro  8.4<H>  /  Alb  3.4  /  TBili  0.4  /  DBili  x   /  AST  8<L>  /  ALT  11<L>  /  AlkPhos  113  02-17          LABORATORY:    CBC Full  -  ( 17 Feb 2022 11:32 )  WBC Count : 14.83 K/uL  RBC Count : 3.42 M/uL  Hemoglobin : 10.0 g/dL  Hematocrit : 31.4 %  Platelet Count - Automated : 479 K/uL  Mean Cell Volume : 91.8 fl  Mean Cell Hemoglobin : 29.2 pg  Mean Cell Hemoglobin Concentration : 31.8 gm/dL  Auto Neutrophil # : 9.79 K/uL  Auto Lymphocyte # : 3.37 K/uL  Auto Monocyte # : 1.24 K/uL  Auto Eosinophil # : 0.27 K/uL  Auto Basophil # : 0.05 K/uL  Auto Neutrophil % : 66.1 %  Auto Lymphocyte % : 22.7 %  Auto Monocyte % : 8.4 %  Auto Eosinophil % : 1.8 %  Auto Basophil % : 0.3 %          02-17    136  |  106  |  51<H>  ----------------------------<  148<H>  5.7<H>   |  21<L>  |  8.40<H>    Ca    9.3      17 Feb 2022 11:32  Phos  4.8     02-17  Mg     3.7     02-17    TPro  8.4<H>  /  Alb  3.4  /  TBili  0.4  /  DBili  x   /  AST  8<L>  /  ALT  11<L>  /  AlkPhos  113  02-17    Assessment and Plan:    1. Leukocytosis  2. ESRD on HD.    No active infections noted.   Monitor off antibiotics.  Get procalcitonin.  HD as per nephrology.    Thank you.

## 2022-02-17 NOTE — CONSULT NOTE ADULT - SUBJECTIVE AND OBJECTIVE BOX
DARIEN MCGINNIS    PLV 1EAS 112 W1    Allergies    No Known Allergies    Intolerances        PAST MEDICAL & SURGICAL HISTORY:  DM (diabetes mellitus)    HTN (hypertension)    Chronic kidney disease, unspecified CKD stage    ESRD on dialysis    Pneumonia    Sepsis    Anemia    No significant past surgical history        FAMILY HISTORY:      Home Medications:      MEDICATIONS  (STANDING):  amLODIPine   Tablet 10 milliGRAM(s) Oral daily  dextrose 40% Gel 15 Gram(s) Oral once  dextrose 5%. 1000 milliLiter(s) (50 mL/Hr) IV Continuous <Continuous>  dextrose 5%. 1000 milliLiter(s) (100 mL/Hr) IV Continuous <Continuous>  dextrose 50% Injectable 25 Gram(s) IV Push once  dextrose 50% Injectable 12.5 Gram(s) IV Push once  dextrose 50% Injectable 25 Gram(s) IV Push once  glucagon  Injectable 1 milliGRAM(s) IntraMuscular once  insulin lispro (ADMELOG) corrective regimen sliding scale   SubCutaneous three times a day before meals  insulin lispro (ADMELOG) corrective regimen sliding scale   SubCutaneous at bedtime  losartan 100 milliGRAM(s) Oral daily  pantoprazole    Tablet 40 milliGRAM(s) Oral before breakfast    MEDICATIONS  (PRN):  acetaminophen     Tablet .. 650 milliGRAM(s) Oral every 6 hours PRN Temp greater or equal to 38C (100.4F), Mild Pain (1 - 3)  aluminum hydroxide/magnesium hydroxide/simethicone Suspension 30 milliLiter(s) Oral every 4 hours PRN Dyspepsia  hydrALAZINE 10 milliGRAM(s) Oral three times a day PRN Systolic blood pressure > 170  melatonin 3 milliGRAM(s) Oral at bedtime PRN Insomnia  ondansetron Injectable 4 milliGRAM(s) IV Push every 8 hours PRN Nausea and/or Vomiting  polyethylene glycol 3350 17 Gram(s) Oral two times a day PRN Constipation  traMADol 50 milliGRAM(s) Oral three times a day PRN Moderate Pain (4 - 6)      Diet, NPO after Midnight:      NPO Start Date: 2022,   NPO Start Time: 23:59  Except Medications (22 @ 20:13) [Active]  Diet, Consistent Carbohydrate Renal w/Evening Snack:   For patients receiving Renal Replacement - No Protein Restr, No Conc K, No Conc Phos, Low Sodium (RENAL) (22 @ 17:44) [Active]          Vital Signs Last 24 Hrs  T(C): 36.7 (2022 20:39), Max: 37.1 (2022 19:38)  T(F): 98 (2022 20:39), Max: 98.7 (2022 19:38)  HR: 94 (2022 20:39) (77 - 99)  BP: 161/87 (2022 20:39) (154/90 - 199/103)  BP(mean): --  RR: 17 (2022 20:39) (16 - 22)  SpO2: 97% (2022 20:39) (97% - 100%)              LABS:                        10.0   14.83 )-----------( 479      ( 2022 11:32 )             31.4         136  |  106  |  51<H>  ----------------------------<  148<H>  5.7<H>   |  21<L>  |  8.40<H>    Ca    9.3      2022 11:32  Phos  4.8     -  Mg     3.7         TPro  8.4<H>  /  Alb  3.4  /  TBili  0.4  /  DBili  x   /  AST  8<L>  /  ALT  11<L>  /  AlkPhos  113  -17    PT/INR - ( 2022 11:32 )   PT: 12.1 sec;   INR: 1.04 ratio         PTT - ( 2022 11:32 )  PTT:34.4 sec  Urinalysis Basic - ( 2022 11:32 )    Color: Yellow / Appearance: Clear / S.010 / pH: x  Gluc: x / Ketone: Negative  / Bili: Negative / Urobili: Negative   Blood: x / Protein: 100 / Nitrite: Negative   Leuk Esterase: Trace / RBC: 0-2 /HPF / WBC 11-25   Sq Epi: x / Non Sq Epi: Occasional / Bacteria: Few            WBC:  WBC Count: 14.83 K/uL ( @ 11:32)      MICROBIOLOGY:  RECENT CULTURES:              PT/INR - ( 2022 11:32 )   PT: 12.1 sec;   INR: 1.04 ratio         PTT - ( 2022 11:32 )  PTT:34.4 sec    Sodium:  Sodium, Serum: 136 mmol/L ( @ 11:32)      8.40 mg/dL  @ 11:32      Hemoglobin:  Hemoglobin: 10.0 g/dL ( @ 11:32)      Platelets: Platelet Count - Automated: 479 K/uL ( @ 11:32)      LIVER FUNCTIONS - ( 2022 11:32 )  Alb: 3.4 g/dL / Pro: 8.4 g/dL / ALK PHOS: 113 U/L / ALT: 11 U/L / AST: 8 U/L / GGT: x             Urinalysis Basic - ( 2022 11:32 )    Color: Yellow / Appearance: Clear / S.010 / pH: x  Gluc: x / Ketone: Negative  / Bili: Negative / Urobili: Negative   Blood: x / Protein: 100 / Nitrite: Negative   Leuk Esterase: Trace / RBC: 0-2 /HPF / WBC 11-25   Sq Epi: x / Non Sq Epi: Occasional / Bacteria: Few        RADIOLOGY & ADDITIONAL STUDIES:      MICROBIOLOGY:  RECENT CULTURES:

## 2022-02-17 NOTE — ED PROVIDER NOTE - CLINICAL SUMMARY MEDICAL DECISION MAKING FREE TEXT BOX
pt is a 67yo male with pmhx of DM, HTN, ANEMIA, CKD STAGE V ON DIALYSIS TWICE WEEKLY, S/P PNA/SEPSIS 12/21 IN [place], METABOLIC ACIDOSIS SINCE RESOLVED presents for dialysis. pt son in law used as  per pt request. pt was recently diagnosed with CKD in 12/21 while living in [place]. pt at this time had a Cr of 9 and was placed on dialysis twice weekly (last time 2/15 [place] time/ 2/[place] [facility] [place]). [place] few days ago which daughter consulted dr cuevas nephmagda who advised pt to come to ed for eval since pt has not had labs, etc [place]. pt had repeat labs prior ot leaving [place] showing cr of 7.8. pt currently reports bl le pain with leg swelling. pt recently took a flight from [facility] which took aprox ~30 hours. pt without other complaints.  pt with esrd on dialysis with le swelling and pain. will do labs, ekg,cxr, us renal and le to r/o dvt, consult nephro and admit

## 2022-02-17 NOTE — H&P ADULT - NSICDXPASTMEDICALHX_GEN_ALL_CORE_FT
PAST MEDICAL HISTORY:  Anemia     Chronic kidney disease, unspecified CKD stage     DM (diabetes mellitus)     ESRD on dialysis     HTN (hypertension)     Pneumonia     Sepsis

## 2022-02-17 NOTE — H&P ADULT - NSHPLABSRESULTS_GEN_ALL_CORE
RIGHT:  Normal compressibility of the RIGHT common femoral, femoral and popliteal   veins.  Doppler examination shows normal spontaneous and phasic flow.  No RIGHT calf vein thrombosis is detected however note that the right   peroneal and soleal veins are not visualized..    LEFT:  Normal compressibility of the LEFT common femoral, femoral and popliteal   veins.  Doppler examination shows normal spontaneous and phasic flow.  No LEFT calf vein thrombosis is detected.    IMPRESSION:  No evidence of deep venous thrombosis in either lower extremity.    Please note that ultrasound is less sensitive for evaluation of thrombus   in the calf veins. If clinical concern persists, reevaluation can be   performed after 5 to 7 days to evaluate for propagation of clot.    < end of copied text >    < from: US Kidney and Bladder (02.17.22 @ 12:16) >      FINDINGS:  Right kidney: 9.5 cm. No solid renal mass, hydronephrosis or calculi.   There is a 1.0 x 1.0 x 1.2 cm cyst at the interpolar region of the right   kidney. There is a 3.7 x 4.1 x 4.8 cm exophytic cyst at the lateral   interpolar region of the right kidney.    Left kidney: 9.8 cm. No solid renal mass, hydronephrosis or calculi.   There is a 1.6 x 0.7 x 1.4 cm cyst at the upper pole of the left kidney.    Urinary bladder: The bladder is empty and therefore cannot be assessed.    IMPRESSION:  No hydronephrosis.    < end of copied text >    < from: Xray Chest 2 Views PA/Lat (02.17.22 @ 10:21) >    Heart size is within normal limits. Old right rib fractures noted.    Lungs are clear.    Right dialysis catheter in place.    IMPRESSION: No acute finding. Dialysis catheter in place.    < end of copied text >    < from: IR Procedure (02.17.22 @ 14:31) >    Impression:  Successful insertion of a temporary dialysis catheter via the right   internal jugular vein. Tip of catheter is at the distal SVC.    < end of copied text >

## 2022-02-17 NOTE — ED ADULT TRIAGE NOTE - CHIEF COMPLAINT QUOTE
New dialysis in [place] (last dialysis on Tuesday) we spoke to Dr. Calhoun and he told us to check into the ER here and call him, he wants a medical evaluation

## 2022-02-17 NOTE — PATIENT PROFILE ADULT - FUNCTIONAL ASSESSMENT - DAILY ACTIVITY 6.
(Live interactive 2-way video visit due to COVID 19 pandemic).  Patient will be signing via portal consent to treatment, medication and treatment plan.  This video visit has been performed while patient was at her place, in [place] and this writer at her home office, in [place].        Subjective:  Patient is a 45-year-old   woman, mother of an 8-year-old son, a clinic manager at [employer], with diagnosis of major depressive disorder, recurrent, anxiety disorder, and ADHD (untreated), this is a medication management follow-up visit.    Her last visit with this writer was 8 months ago.      She continues to struggle with job stressors and increased workload, however next January “it will get better ”.  She has been managing 8 Clinics.  “No system is perfect”.      She has been experiencing worsening of anxiety.    However patient denies any panic attacks.      Her mood has been stable despite her work stressors.    Patient denies any vegetative symptoms of depression.    Her overall level functioning and job performance have been good.      Lately her blood pressure has been slightly elevated, 130/88.    Appetite and weight have been unchanged.      Patient reports compliance with medication treatment denies any side effects from it.    30 minutes of counseling and psychoeducation have been provided, her thoughts and concerns have been heard and empathized, cognitive behavior strategies have been discussed.  More than 50% of time has been spent in counseling.     Objective:  She is well groomed, alert, and oriented x3.    Speech verbose.    Affect seems euthymic and appropriate.    She denies experiencing any suicidal ideations or plans.     Assessment:  Major depressive disorder, recurrent, anxiety disorder, and ADHD (untreated).     Treatment plan:  She will continue with Zoloft 100 mg daily .    Patient to focus on self-care, to take respite time, to exercise regularly, and to do activities  that would help with mindfulness and relaxation, especially deep breathing and yoga.    She was advised to decrease her salt intake.      She will be returning to clinic for another follow-up visit in 6-8 months.   4 = No assist / stand by assistance
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
## 2022-02-18 LAB
A1C WITH ESTIMATED AVERAGE GLUCOSE RESULT: 6.8 % — HIGH (ref 4–5.6)
ALBUMIN SERPL ELPH-MCNC: 3.1 G/DL — LOW (ref 3.3–5)
ALBUMIN SERPL ELPH-MCNC: 3.1 G/DL — LOW (ref 3.3–5)
ALP SERPL-CCNC: 95 U/L — SIGNIFICANT CHANGE UP (ref 40–120)
ALP SERPL-CCNC: 97 U/L — SIGNIFICANT CHANGE UP (ref 40–120)
ALT FLD-CCNC: 11 U/L — LOW (ref 12–78)
ALT FLD-CCNC: 12 U/L — SIGNIFICANT CHANGE UP (ref 12–78)
ANION GAP SERPL CALC-SCNC: 8 MMOL/L — SIGNIFICANT CHANGE UP (ref 5–17)
ANION GAP SERPL CALC-SCNC: 9 MMOL/L — SIGNIFICANT CHANGE UP (ref 5–17)
AST SERPL-CCNC: 11 U/L — LOW (ref 15–37)
AST SERPL-CCNC: 12 U/L — LOW (ref 15–37)
BASOPHILS # BLD AUTO: 0.04 K/UL — SIGNIFICANT CHANGE UP (ref 0–0.2)
BASOPHILS NFR BLD AUTO: 0.3 % — SIGNIFICANT CHANGE UP (ref 0–2)
BILIRUB SERPL-MCNC: 0.5 MG/DL — SIGNIFICANT CHANGE UP (ref 0.2–1.2)
BILIRUB SERPL-MCNC: 0.5 MG/DL — SIGNIFICANT CHANGE UP (ref 0.2–1.2)
BUN SERPL-MCNC: 51 MG/DL — HIGH (ref 7–23)
BUN SERPL-MCNC: 53 MG/DL — HIGH (ref 7–23)
CALCIUM SERPL-MCNC: 8.6 MG/DL — SIGNIFICANT CHANGE UP (ref 8.5–10.1)
CALCIUM SERPL-MCNC: 8.9 MG/DL — SIGNIFICANT CHANGE UP (ref 8.5–10.1)
CHLORIDE SERPL-SCNC: 103 MMOL/L — SIGNIFICANT CHANGE UP (ref 96–108)
CHLORIDE SERPL-SCNC: 104 MMOL/L — SIGNIFICANT CHANGE UP (ref 96–108)
CO2 SERPL-SCNC: 22 MMOL/L — SIGNIFICANT CHANGE UP (ref 22–31)
CO2 SERPL-SCNC: 22 MMOL/L — SIGNIFICANT CHANGE UP (ref 22–31)
CREAT SERPL-MCNC: 7.8 MG/DL — HIGH (ref 0.5–1.3)
CREAT SERPL-MCNC: 7.9 MG/DL — HIGH (ref 0.5–1.3)
EOSINOPHIL # BLD AUTO: 0.22 K/UL — SIGNIFICANT CHANGE UP (ref 0–0.5)
EOSINOPHIL NFR BLD AUTO: 1.6 % — SIGNIFICANT CHANGE UP (ref 0–6)
ESTIMATED AVERAGE GLUCOSE: 148 MG/DL — HIGH (ref 68–114)
GLUCOSE SERPL-MCNC: 94 MG/DL — SIGNIFICANT CHANGE UP (ref 70–99)
GLUCOSE SERPL-MCNC: 94 MG/DL — SIGNIFICANT CHANGE UP (ref 70–99)
HBV CORE AB SER-ACNC: SIGNIFICANT CHANGE UP
HBV SURFACE AB SER-ACNC: 224.9 MIU/ML — SIGNIFICANT CHANGE UP
HBV SURFACE AB SER-ACNC: REACTIVE
HBV SURFACE AG SER-ACNC: SIGNIFICANT CHANGE UP
HCT VFR BLD CALC: 29.7 % — LOW (ref 39–50)
HCT VFR BLD CALC: 29.9 % — LOW (ref 39–50)
HCV AB S/CO SERPL IA: 0.14 S/CO — SIGNIFICANT CHANGE UP (ref 0–0.99)
HCV AB SERPL-IMP: SIGNIFICANT CHANGE UP
HGB BLD-MCNC: 9.6 G/DL — LOW (ref 13–17)
HGB BLD-MCNC: 9.7 G/DL — LOW (ref 13–17)
IMM GRANULOCYTES NFR BLD AUTO: 0.6 % — SIGNIFICANT CHANGE UP (ref 0–1.5)
LYMPHOCYTES # BLD AUTO: 18.5 % — SIGNIFICANT CHANGE UP (ref 13–44)
LYMPHOCYTES # BLD AUTO: 2.57 K/UL — SIGNIFICANT CHANGE UP (ref 1–3.3)
MCHC RBC-ENTMCNC: 29.5 PG — SIGNIFICANT CHANGE UP (ref 27–34)
MCHC RBC-ENTMCNC: 29.8 PG — SIGNIFICANT CHANGE UP (ref 27–34)
MCHC RBC-ENTMCNC: 32.3 GM/DL — SIGNIFICANT CHANGE UP (ref 32–36)
MCHC RBC-ENTMCNC: 32.4 GM/DL — SIGNIFICANT CHANGE UP (ref 32–36)
MCV RBC AUTO: 91.4 FL — SIGNIFICANT CHANGE UP (ref 80–100)
MCV RBC AUTO: 92 FL — SIGNIFICANT CHANGE UP (ref 80–100)
MONOCYTES # BLD AUTO: 1.06 K/UL — HIGH (ref 0–0.9)
MONOCYTES NFR BLD AUTO: 7.6 % — SIGNIFICANT CHANGE UP (ref 2–14)
NEUTROPHILS # BLD AUTO: 9.89 K/UL — HIGH (ref 1.8–7.4)
NEUTROPHILS NFR BLD AUTO: 71.4 % — SIGNIFICANT CHANGE UP (ref 43–77)
NRBC # BLD: 0 /100 WBCS — SIGNIFICANT CHANGE UP (ref 0–0)
NRBC # BLD: 0 /100 WBCS — SIGNIFICANT CHANGE UP (ref 0–0)
NT-PROBNP SERPL-SCNC: 3574 PG/ML — HIGH (ref 0–125)
PHOSPHATE SERPL-MCNC: 4.9 MG/DL — HIGH (ref 2.5–4.5)
PLATELET # BLD AUTO: 463 K/UL — HIGH (ref 150–400)
PLATELET # BLD AUTO: 470 K/UL — HIGH (ref 150–400)
POTASSIUM SERPL-MCNC: 5.6 MMOL/L — HIGH (ref 3.5–5.3)
POTASSIUM SERPL-MCNC: 5.8 MMOL/L — HIGH (ref 3.5–5.3)
POTASSIUM SERPL-SCNC: 5.6 MMOL/L — HIGH (ref 3.5–5.3)
POTASSIUM SERPL-SCNC: 5.8 MMOL/L — HIGH (ref 3.5–5.3)
PROCALCITONIN SERPL-MCNC: 0.3 NG/ML — HIGH (ref 0–0.04)
PROT SERPL-MCNC: 7.7 G/DL — SIGNIFICANT CHANGE UP (ref 6–8.3)
PROT SERPL-MCNC: 7.8 G/DL — SIGNIFICANT CHANGE UP (ref 6–8.3)
RBC # BLD: 3.25 M/UL — LOW (ref 4.2–5.8)
RBC # BLD: 3.25 M/UL — LOW (ref 4.2–5.8)
RBC # FLD: 16.2 % — HIGH (ref 10.3–14.5)
RBC # FLD: 16.3 % — HIGH (ref 10.3–14.5)
SODIUM SERPL-SCNC: 134 MMOL/L — LOW (ref 135–145)
SODIUM SERPL-SCNC: 134 MMOL/L — LOW (ref 135–145)
TSH SERPL-MCNC: 0.82 UIU/ML — SIGNIFICANT CHANGE UP (ref 0.36–3.74)
WBC # BLD: 13.86 K/UL — HIGH (ref 3.8–10.5)
WBC # BLD: 15.1 K/UL — HIGH (ref 3.8–10.5)
WBC # FLD AUTO: 13.86 K/UL — HIGH (ref 3.8–10.5)
WBC # FLD AUTO: 15.1 K/UL — HIGH (ref 3.8–10.5)

## 2022-02-18 PROCEDURE — 71250 CT THORAX DX C-: CPT | Mod: 26

## 2022-02-18 PROCEDURE — 74176 CT ABD & PELVIS W/O CONTRAST: CPT | Mod: 26

## 2022-02-18 RX ORDER — SODIUM ZIRCONIUM CYCLOSILICATE 10 G/10G
10 POWDER, FOR SUSPENSION ORAL ONCE
Refills: 0 | Status: COMPLETED | OUTPATIENT
Start: 2022-02-18 | End: 2022-02-18

## 2022-02-18 RX ORDER — VANCOMYCIN HCL 1 G
1000 VIAL (EA) INTRAVENOUS ONCE
Refills: 0 | Status: COMPLETED | OUTPATIENT
Start: 2022-02-18 | End: 2022-02-18

## 2022-02-18 RX ADMIN — HEPARIN SODIUM 5000 UNIT(S): 5000 INJECTION INTRAVENOUS; SUBCUTANEOUS at 17:11

## 2022-02-18 RX ADMIN — AMLODIPINE BESYLATE 10 MILLIGRAM(S): 2.5 TABLET ORAL at 05:14

## 2022-02-18 RX ADMIN — HEPARIN SODIUM 5000 UNIT(S): 5000 INJECTION INTRAVENOUS; SUBCUTANEOUS at 05:14

## 2022-02-18 RX ADMIN — Medication 250 MILLIGRAM(S): at 23:11

## 2022-02-18 RX ADMIN — SODIUM ZIRCONIUM CYCLOSILICATE 10 GRAM(S): 10 POWDER, FOR SUSPENSION ORAL at 23:11

## 2022-02-18 RX ADMIN — LOSARTAN POTASSIUM 100 MILLIGRAM(S): 100 TABLET, FILM COATED ORAL at 05:14

## 2022-02-18 RX ADMIN — Medication 4: at 17:11

## 2022-02-18 RX ADMIN — PANTOPRAZOLE SODIUM 40 MILLIGRAM(S): 20 TABLET, DELAYED RELEASE ORAL at 05:15

## 2022-02-18 NOTE — DIETITIAN INITIAL EVALUATION ADULT. - CHIEF COMPLAINT
66y Male with PMH of Anemia, ESRD-HD, HTN, DM. Pt admitted on 2/17 complaining of medical evaluation.

## 2022-02-18 NOTE — DIETITIAN INITIAL EVALUATION ADULT. - ADD RECOMMEND
1) Continue Consistent carbohydrate, renal diet. 2) Add Nephro-anay and Nepro BID. 3) Monitor PO intake, labs, GI function, weights.

## 2022-02-18 NOTE — PROGRESS NOTE ADULT - SUBJECTIVE AND OBJECTIVE BOX
DARIEN MCGINNIS    PLV 1EAS 112 W1    Allergies    No Known Allergies    Intolerances        PAST MEDICAL & SURGICAL HISTORY:  DM (diabetes mellitus)    HTN (hypertension)    Chronic kidney disease, unspecified CKD stage    ESRD on dialysis    Pneumonia    Sepsis    Anemia    No significant past surgical history        FAMILY HISTORY:      Home Medications:      MEDICATIONS  (STANDING):  amLODIPine   Tablet 10 milliGRAM(s) Oral daily  dextrose 40% Gel 15 Gram(s) Oral once  dextrose 5%. 1000 milliLiter(s) (50 mL/Hr) IV Continuous <Continuous>  dextrose 5%. 1000 milliLiter(s) (100 mL/Hr) IV Continuous <Continuous>  dextrose 50% Injectable 25 Gram(s) IV Push once  dextrose 50% Injectable 12.5 Gram(s) IV Push once  dextrose 50% Injectable 25 Gram(s) IV Push once  glucagon  Injectable 1 milliGRAM(s) IntraMuscular once  heparin   Injectable 5000 Unit(s) SubCutaneous every 12 hours  insulin lispro (ADMELOG) corrective regimen sliding scale   SubCutaneous three times a day before meals  insulin lispro (ADMELOG) corrective regimen sliding scale   SubCutaneous at bedtime  losartan 100 milliGRAM(s) Oral daily  pantoprazole    Tablet 40 milliGRAM(s) Oral before breakfast    MEDICATIONS  (PRN):  acetaminophen     Tablet .. 650 milliGRAM(s) Oral every 6 hours PRN Temp greater or equal to 38C (100.4F), Mild Pain (1 - 3)  aluminum hydroxide/magnesium hydroxide/simethicone Suspension 30 milliLiter(s) Oral every 4 hours PRN Dyspepsia  hydrALAZINE 10 milliGRAM(s) Oral three times a day PRN Systolic blood pressure > 170  melatonin 3 milliGRAM(s) Oral at bedtime PRN Insomnia  ondansetron Injectable 4 milliGRAM(s) IV Push every 8 hours PRN Nausea and/or Vomiting  polyethylene glycol 3350 17 Gram(s) Oral two times a day PRN Constipation  traMADol 50 milliGRAM(s) Oral three times a day PRN Moderate Pain (4 - 6)      Diet, NPO after Midnight:      NPO Start Date: 2022,   NPO Start Time: 23:59  Except Medications (22 @ 20:13) [Active]  Diet, Consistent Carbohydrate Renal w/Evening Snack:   For patients receiving Renal Replacement - No Protein Restr, No Conc K, No Conc Phos, Low Sodium (RENAL) (22 @ 17:44) [Active]          Vital Signs Last 24 Hrs  T(C): 36.9 (2022 05:14), Max: 37.1 (2022 19:38)  T(F): 98.4 (2022 05:14), Max: 98.7 (2022 19:38)  HR: 87 (2022 05:14) (77 - 99)  BP: 179/79 (2022 05:14) (154/90 - 199/103)  BP(mean): --  RR: 17 (2022 05:14) (16 - 22)  SpO2: 97% (2022 05:14) (97% - 100%)              LABS:                        9.7    13.86 )-----------( 470      ( 2022 08:12 )             29.9         134<L>  |  104  |  51<H>  ----------------------------<  94  5.8<H>   |  22  |  7.90<H>    Ca    8.9      2022 08:12  Phos  4.8       Mg     3.7         TPro  7.7  /  Alb  3.1<L>  /  TBili  0.5  /  DBili  x   /  AST  12<L>  /  ALT  12  /  AlkPhos  97      PT/INR - ( 2022 11:32 )   PT: 12.1 sec;   INR: 1.04 ratio         PTT - ( 2022 11:32 )  PTT:34.4 sec  Urinalysis Basic - ( 2022 11:32 )    Color: Yellow / Appearance: Clear / S.010 / pH: x  Gluc: x / Ketone: Negative  / Bili: Negative / Urobili: Negative   Blood: x / Protein: 100 / Nitrite: Negative   Leuk Esterase: Trace / RBC: 0-2 /HPF / WBC 11-25   Sq Epi: x / Non Sq Epi: Occasional / Bacteria: Few            WBC:  WBC Count: 13.86 K/uL ( @ 08:12)  WBC Count: 14.83 K/uL ( @ 11:32)      MICROBIOLOGY:  RECENT CULTURES:              PT/INR - ( 2022 11:32 )   PT: 12.1 sec;   INR: 1.04 ratio         PTT - ( 2022 11:32 )  PTT:34.4 sec    Sodium:  Sodium, Serum: 134 mmol/L ( @ 08:12)  Sodium, Serum: 136 mmol/L ( @ 11:32)      7.90 mg/dL  @ 08:12  8.40 mg/dL :32      Hemoglobin:  Hemoglobin: 9.7 g/dL ( @ 08:12)  Hemoglobin: 10.0 g/dL ( @ :32)      Platelets: Platelet Count - Automated: 470 K/uL ( @ 08:12)  Platelet Count - Automated: 479 K/uL ( @ 11:32)      LIVER FUNCTIONS - ( 2022 08:12 )  Alb: 3.1 g/dL / Pro: 7.7 g/dL / ALK PHOS: 97 U/L / ALT: 12 U/L / AST: 12 U/L / GGT: x             Urinalysis Basic - ( 2022 11:32 )    Color: Yellow / Appearance: Clear / S.010 / pH: x  Gluc: x / Ketone: Negative  / Bili: Negative / Urobili: Negative   Blood: x / Protein: 100 / Nitrite: Negative   Leuk Esterase: Trace / RBC: 0-2 /HPF / WBC 11-25   Sq Epi: x / Non Sq Epi: Occasional / Bacteria: Few        RADIOLOGY & ADDITIONAL STUDIES:      MICROBIOLOGY:  RECENT CULTURES:

## 2022-02-18 NOTE — PROGRESS NOTE ADULT - SUBJECTIVE AND OBJECTIVE BOX
Interval History:    CENTRAL LINE:   [  ] YES       [  ] NO  ELIAS:                 [  ] YES       [  ] NO         REVIEW OF SYSTEMS:  All Systems below were reviewed and are negative [  ]  HEENT:  ID:  Pulmonary:  Cardiac:  GI:  Renal:  Musculoskeletal:  All other systems above were reviewed and are negative   [  ]      MEDICATIONS  (STANDING):  amLODIPine   Tablet 10 milliGRAM(s) Oral daily  dextrose 40% Gel 15 Gram(s) Oral once  dextrose 5%. 1000 milliLiter(s) (50 mL/Hr) IV Continuous <Continuous>  dextrose 5%. 1000 milliLiter(s) (100 mL/Hr) IV Continuous <Continuous>  dextrose 50% Injectable 25 Gram(s) IV Push once  dextrose 50% Injectable 12.5 Gram(s) IV Push once  dextrose 50% Injectable 25 Gram(s) IV Push once  glucagon  Injectable 1 milliGRAM(s) IntraMuscular once  heparin   Injectable 5000 Unit(s) SubCutaneous every 12 hours  insulin lispro (ADMELOG) corrective regimen sliding scale   SubCutaneous three times a day before meals  insulin lispro (ADMELOG) corrective regimen sliding scale   SubCutaneous at bedtime  losartan 100 milliGRAM(s) Oral daily  pantoprazole    Tablet 40 milliGRAM(s) Oral before breakfast  sodium zirconium cyclosilicate 10 Gram(s) Oral once  vancomycin  IVPB 1000 milliGRAM(s) IV Intermittent once    MEDICATIONS  (PRN):  acetaminophen     Tablet .. 650 milliGRAM(s) Oral every 6 hours PRN Temp greater or equal to 38C (100.4F), Mild Pain (1 - 3)  aluminum hydroxide/magnesium hydroxide/simethicone Suspension 30 milliLiter(s) Oral every 4 hours PRN Dyspepsia  hydrALAZINE 10 milliGRAM(s) Oral three times a day PRN Systolic blood pressure > 170  melatonin 3 milliGRAM(s) Oral at bedtime PRN Insomnia  ondansetron Injectable 4 milliGRAM(s) IV Push every 8 hours PRN Nausea and/or Vomiting  polyethylene glycol 3350 17 Gram(s) Oral two times a day PRN Constipation  traMADol 50 milliGRAM(s) Oral three times a day PRN Moderate Pain (4 - 6)      Vital Signs Last 24 Hrs  T(C): 36.8 (18 Feb 2022 13:52), Max: 37.1 (17 Feb 2022 19:38)  T(F): 98.2 (18 Feb 2022 13:52), Max: 98.7 (17 Feb 2022 19:38)  HR: 88 (18 Feb 2022 13:52) (83 - 96)  BP: 147/78 (18 Feb 2022 13:52) (98/65 - 179/79)  BP(mean): --  RR: 16 (18 Feb 2022 13:52) (16 - 18)  SpO2: 98% (18 Feb 2022 13:52) (97% - 100%)    I&O's Summary    18 Feb 2022 07:01  -  18 Feb 2022 19:19  --------------------------------------------------------  IN: 0 mL / OUT: 1500 mL / NET: -1500 mL        PHYSICAL EXAM:  HEENT: NC/AT; PERRLA  Neck: Soft; no tenderness  Lungs: CTA bilaterally; no wheezing.   Heart:  Abdomen:  Genital/ Rectal:  Extremities:  Neurologic:  Vascular:      LABORATORY:    CBC Full  -  ( 18 Feb 2022 09:58 )  WBC Count : 15.10 K/uL  RBC Count : 3.25 M/uL  Hemoglobin : 9.6 g/dL  Hematocrit : 29.7 %  Platelet Count - Automated : 463 K/uL  Mean Cell Volume : 91.4 fl  Mean Cell Hemoglobin : 29.5 pg  Mean Cell Hemoglobin Concentration : 32.3 gm/dL  Auto Neutrophil # : x  Auto Lymphocyte # : x  Auto Monocyte # : x  Auto Eosinophil # : x  Auto Basophil # : x  Auto Neutrophil % : x  Auto Lymphocyte % : x  Auto Monocyte % : x  Auto Eosinophil % : x  Auto Basophil % : x      ESR:                   02-18 @ 08:12  --    C-Reactive Protein:     02-18 @ 08:12  --    Procalcitonin:           02-18 @ 08:12   0.30      02-18    134<L>  |  103  |  53<H>  ----------------------------<  94  5.6<H>   |  22  |  7.80<H>    Ca    8.6      18 Feb 2022 09:58  Phos  4.9     02-18  Mg     3.7     02-17    TPro  7.8  /  Alb  3.1<L>  /  TBili  0.5  /  DBili  x   /  AST  11<L>  /  ALT  11<L>  /  AlkPhos  95  02-18          Assessment and Plan:          Jeffrey Johnson MD   [phone].    He is afebrile  No new events     MEDICATIONS  (STANDING):  amLODIPine   Tablet 10 milliGRAM(s) Oral daily  dextrose 40% Gel 15 Gram(s) Oral once  dextrose 5%. 1000 milliLiter(s) (50 mL/Hr) IV Continuous <Continuous>  dextrose 5%. 1000 milliLiter(s) (100 mL/Hr) IV Continuous <Continuous>  dextrose 50% Injectable 25 Gram(s) IV Push once  dextrose 50% Injectable 12.5 Gram(s) IV Push once  dextrose 50% Injectable 25 Gram(s) IV Push once  glucagon  Injectable 1 milliGRAM(s) IntraMuscular once  heparin   Injectable 5000 Unit(s) SubCutaneous every 12 hours  insulin lispro (ADMELOG) corrective regimen sliding scale   SubCutaneous three times a day before meals  insulin lispro (ADMELOG) corrective regimen sliding scale   SubCutaneous at bedtime  losartan 100 milliGRAM(s) Oral daily  pantoprazole    Tablet 40 milliGRAM(s) Oral before breakfast  sodium zirconium cyclosilicate 10 Gram(s) Oral once  vancomycin  IVPB 1000 milliGRAM(s) IV Intermittent once    MEDICATIONS  (PRN):  acetaminophen     Tablet .. 650 milliGRAM(s) Oral every 6 hours PRN Temp greater or equal to 38C (100.4F), Mild Pain (1 - 3)  aluminum hydroxide/magnesium hydroxide/simethicone Suspension 30 milliLiter(s) Oral every 4 hours PRN Dyspepsia  hydrALAZINE 10 milliGRAM(s) Oral three times a day PRN Systolic blood pressure > 170  melatonin 3 milliGRAM(s) Oral at bedtime PRN Insomnia  ondansetron Injectable 4 milliGRAM(s) IV Push every 8 hours PRN Nausea and/or Vomiting  polyethylene glycol 3350 17 Gram(s) Oral two times a day PRN Constipation  traMADol 50 milliGRAM(s) Oral three times a day PRN Moderate Pain (4 - 6)      Vital Signs Last 24 Hrs  T(C): 36.8 (18 Feb 2022 13:52), Max: 37.1 (17 Feb 2022 19:38)  T(F): 98.2 (18 Feb 2022 13:52), Max: 98.7 (17 Feb 2022 19:38)  HR: 88 (18 Feb 2022 13:52) (83 - 96)  BP: 147/78 (18 Feb 2022 13:52) (98/65 - 179/79)  BP(mean): --  RR: 16 (18 Feb 2022 13:52) (16 - 18)  SpO2: 98% (18 Feb 2022 13:52) (97% - 100%)    I&O's Summary    18 Feb 2022 07:01  -  18 Feb 2022 19:19  --------------------------------------------------------  IN: 0 mL / OUT: 1500 mL / NET: -1500 mL        PHYSICAL EXAM:  HEENT: NC/AT; PERRLA  Neck: Soft; no tenderness  Lungs: Coarse BS bilaterally; no wheezing.   Heart: RRR, no murmurs.   Abdomen: Soft, mild tenderness   Genital/ Rectal: No griffith   Extremities: No ulcers.   Neurologic: Awake.       LABORATORY:    CBC Full  -  ( 18 Feb 2022 09:58 )  WBC Count : 15.10 K/uL  RBC Count : 3.25 M/uL  Hemoglobin : 9.6 g/dL  Hematocrit : 29.7 %  Platelet Count - Automated : 463 K/uL  Mean Cell Volume : 91.4 fl  Mean Cell Hemoglobin : 29.5 pg  Mean Cell Hemoglobin Concentration : 32.3 gm/dL  Auto Neutrophil # : x  Auto Lymphocyte # : x  Auto Monocyte # : x  Auto Eosinophil # : x  Auto Basophil # : x  Auto Neutrophil % : x  Auto Lymphocyte % : x  Auto Monocyte % : x  Auto Eosinophil % : x  Auto Basophil % : x      ESR:                   02-18 @ 08:12  --    C-Reactive Protein:     02-18 @ 08:12  --    Procalcitonin:           02-18 @ 08:12   0.30      02-18    134<L>  |  103  |  53<H>  ----------------------------<  94  5.6<H>   |  22  |  7.80<H>    Ca    8.6      18 Feb 2022 09:58  Phos  4.9     02-18  Mg     3.7     02-17    TPro  7.8  /  Alb  3.1<L>  /  TBili  0.5  /  DBili  x   /  AST  11<L>  /  ALT  11<L>  /  AlkPhos  95  02-18      Assessment and Plan:    1. Leukocytosis  2. ESRD on HD.    No active infections noted so far but PCT is elevated. Unclear sources of persistent leukocytosis. Will get CT of chest, abdomen and pelvis.   Monitor off antibiotics.  Waiting for permacath placement.   HD as per nephrology.        Jeffrey Johnson MD   [phone].
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
## 2022-02-18 NOTE — PROGRESS NOTE ADULT - SUBJECTIVE AND OBJECTIVE BOX
Patient is a 66y Male whom presented to the hospital with esrd on hd     PAST MEDICAL & SURGICAL HISTORY:  DM (diabetes mellitus)    HTN (hypertension)    Chronic kidney disease, unspecified CKD stage    ESRD on dialysis    Pneumonia    Sepsis    Anemia    No significant past surgical history        MEDICATIONS  (STANDING):  amLODIPine   Tablet 5 milliGRAM(s) Oral daily  amLODIPine   Tablet 5 milliGRAM(s) Oral once  dextrose 40% Gel 15 Gram(s) Oral once  dextrose 5%. 1000 milliLiter(s) (50 mL/Hr) IV Continuous <Continuous>  dextrose 5%. 1000 milliLiter(s) (100 mL/Hr) IV Continuous <Continuous>  dextrose 50% Injectable 25 Gram(s) IV Push once  dextrose 50% Injectable 12.5 Gram(s) IV Push once  dextrose 50% Injectable 25 Gram(s) IV Push once  glucagon  Injectable 1 milliGRAM(s) IntraMuscular once  insulin lispro (ADMELOG) corrective regimen sliding scale   SubCutaneous three times a day before meals  insulin lispro (ADMELOG) corrective regimen sliding scale   SubCutaneous at bedtime  pantoprazole    Tablet 40 milliGRAM(s) Oral before breakfast      Allergies    No Known Allergies    Intolerances        SOCIAL HISTORY:  Denies ETOh,Smoking,     FAMILY HISTORY:      REVIEW OF SYSTEMS:    CONSTITUTIONAL: No weakness, fevers or chills  RESPIRATORY: No cough, wheezing, hemoptysis; No shortness of breath  CARDIOVASCULAR: No chest pain or palpitations  GASTROINTESTINAL: No abdominal or epigastric pain. No nausea, vomiting,     No diarrhea or constipation. No melena                               9.6    15.10 )-----------( 463      ( 2022 09:58 )             29.7       CBC Full  -  ( 2022 09:58 )  WBC Count : 15.10 K/uL  RBC Count : 3.25 M/uL  Hemoglobin : 9.6 g/dL  Hematocrit : 29.7 %  Platelet Count - Automated : 463 K/uL  Mean Cell Volume : 91.4 fl  Mean Cell Hemoglobin : 29.5 pg  Mean Cell Hemoglobin Concentration : 32.3 gm/dL  Auto Neutrophil # : x  Auto Lymphocyte # : x  Auto Monocyte # : x  Auto Eosinophil # : x  Auto Basophil # : x  Auto Neutrophil % : x  Auto Lymphocyte % : x  Auto Monocyte % : x  Auto Eosinophil % : x  Auto Basophil % : x      02-18    134<L>  |  103  |  53<H>  ----------------------------<  94  5.6<H>   |  22  |  7.80<H>    Ca    8.6      2022 09:58  Phos  4.9       Mg     3.7         TPro  7.8  /  Alb  3.1<L>  /  TBili  0.5  /  DBili  x   /  AST  11<L>  /  ALT  11<L>  /  AlkPhos  95        CAPILLARY BLOOD GLUCOSE      POCT Blood Glucose.: 311 mg/dL (2022 16:53)  POCT Blood Glucose.: 143 mg/dL (2022 12:36)  POCT Blood Glucose.: 109 mg/dL (2022 07:51)  POCT Blood Glucose.: 149 mg/dL (2022 21:43)      Vital Signs Last 24 Hrs  T(C): 36.8 (2022 13:52), Max: 37.1 (2022 19:38)  T(F): 98.2 (2022 13:52), Max: 98.7 (2022 19:38)  HR: 88 (2022 13:52) (83 - 96)  BP: 147/78 (2022 13:52) (98/65 - 179/79)  BP(mean): --  RR: 16 (2022 13:52) (16 - 18)  SpO2: 98% (2022 13:52) (97% - 100%)    Urinalysis Basic - ( 2022 11:32 )    Color: Yellow / Appearance: Clear / S.010 / pH: x  Gluc: x / Ketone: Negative  / Bili: Negative / Urobili: Negative   Blood: x / Protein: 100 / Nitrite: Negative   Leuk Esterase: Trace / RBC: 0-2 /HPF / WBC 11-25   Sq Epi: x / Non Sq Epi: Occasional / Bacteria: Few        PT/INR - ( 2022 11:32 )   PT: 12.1 sec;   INR: 1.04 ratio         PTT - ( 2022 11:32 )  PTT:34.4 sec        PHYSICAL EXAM:    Constitutional: NAD  HEENT: conjunctive   clear   Neck:  No JVD  Respiratory: CTAB  Cardiovascular: S1 and S2  Gastrointestinal: BS+, soft, NT/ND  Extremities: No peripheral edema

## 2022-02-18 NOTE — PHYSICAL THERAPY INITIAL EVALUATION ADULT - PATIENT PROFILE REVIEW, REHAB EVAL
Out of bed with Assist 2/17 - Patient ok for Ambulation/PT Evaluation without restriction, as per RN Jus./yes

## 2022-02-18 NOTE — DIETITIAN INITIAL EVALUATION ADULT. - OTHER INFO
GI/Intake:  -Per RN, very good intake of meals; consistently has a good appetite   -No BM documented thus far; bowel regimen ordered (Miralax)     Endo:  -Hx of DM   -No A1c noted; request to order   -Previously on Metformin; d/erika PTA 2/2 renal dysfunction   -SSI ordered; Consistent carbohydrate diet     Renal:   -ESRD-HD x2/week   -Currently off unit for IR re-placement of HD catheter   -Hyperkalemic, Hypermagnesemic, Hyperphosphatemic  -Unable to educate pt on renal diet at this time    Height/Weight:  -Current: 127 pounds  -No height stated, no additional weights noted

## 2022-02-18 NOTE — DIETITIAN INITIAL EVALUATION ADULT. - ORAL INTAKE PTA/DIET HISTORY
Unable to obtain subjective information at this time; pt is off unit and unable to reach family.    Pt noted with recent travel to [place].

## 2022-02-18 NOTE — DIETITIAN INITIAL EVALUATION ADULT. - PERTINENT LABORATORY DATA
K+: 5.7 <H>  BUN: 51 <H>  Cr: 8.40 <H>  Glucose: 148 <H>  AST: 8 <L>  ALT: 11 <L>  Mg: 3.7 <H>  Phos: 4.8 <H>

## 2022-02-18 NOTE — PHYSICAL THERAPY INITIAL EVALUATION ADULT - PERTINENT HX OF CURRENT PROBLEM, REHAB EVAL
As per H&P, admitted with Dyspnea on exertion & bilateral Lower Extremity edema; S/P PNA/SEPSIS 12/21 IN [place], METABOLIC ACIDOSIS SINCE RESOLVED presents to ED as per Dr Yoder referral  for dialysis , pt was recently diagnosed with CKD in 12/21 while living in [place].

## 2022-02-18 NOTE — PROGRESS NOTE ADULT - SUBJECTIVE AND OBJECTIVE BOX
PROGRESS NOTE  Patient is a 66y old  Male who presents with a chief complaint of sent to ED for HD (2022 11:45)  Chart and available morning labs /imaging are reviewed electronically , urgent issues addressed . More information  is being added upon completion of rounds , when more information is collected and management discussed with consultants , medical staff and social service/case management on the floor   OVERNIGHT  No new issues reported by medical staff . All above noted Patient is resting in a bed comfortably  .No distress noted   HPI:  Patient  is a 65yo male with pmhx of DM, HTN, ANEMIA, CKD STAGE V ON DIALYSIS TWICE WEEKLY ( last session on tuesday in [facility]) , S/P PNA/SEPSIS  IN [facility], METABOLIC ACIDOSIS SINCE RESOLVED presents to ED as per Dr Yoder referral  for dialysis , pt was recently diagnosed with CKD in  while living in [facility]. pt at this time had a Cr of 9 and was placed on dialysis twice weekly (last time 2[place] [facility] [place] . pt had repeat labs prior ot leaving [facility] showing cr of 7.8. pt currently reports bilateral  le pain with leg swelling and perkins . pt recently took a flight from [facility] which took aprox ~30 hours. pt without other complaints. As per family  prior to moving back pt was taken off metformin for dm due to renal dysfunction. Patient makes urine.  pt denies fever, cp, sob, n/v/d, abd pain. pt is not currently covid vaccinated but would like to receive the vaccine when stable .Patient developed HTN with  in ER and cardiology eval was requested .Norvasc started by  and HD session ordered , IR procedure ordered and Rt IJ  non tunneled dialysis catheter placed, tip in svc .Admitted to med floor for further management . (2022 17:25)    PAST MEDICAL & SURGICAL HISTORY:  Chronic kidney disease, unspecified CKD stage    Pneumonia    Sepsis    Anemia    DM (diabetes mellitus)    HTN (hypertension)    ESRD on dialysis    No significant past surgical history        MEDICATIONS  (STANDING):  amLODIPine   Tablet 10 milliGRAM(s) Oral daily  dextrose 40% Gel 15 Gram(s) Oral once  dextrose 5%. 1000 milliLiter(s) (50 mL/Hr) IV Continuous <Continuous>  dextrose 5%. 1000 milliLiter(s) (100 mL/Hr) IV Continuous <Continuous>  dextrose 50% Injectable 25 Gram(s) IV Push once  dextrose 50% Injectable 12.5 Gram(s) IV Push once  dextrose 50% Injectable 25 Gram(s) IV Push once  glucagon  Injectable 1 milliGRAM(s) IntraMuscular once  heparin   Injectable 5000 Unit(s) SubCutaneous every 12 hours  insulin lispro (ADMELOG) corrective regimen sliding scale   SubCutaneous three times a day before meals  insulin lispro (ADMELOG) corrective regimen sliding scale   SubCutaneous at bedtime  losartan 100 milliGRAM(s) Oral daily  pantoprazole    Tablet 40 milliGRAM(s) Oral before breakfast    MEDICATIONS  (PRN):  acetaminophen     Tablet .. 650 milliGRAM(s) Oral every 6 hours PRN Temp greater or equal to 38C (100.4F), Mild Pain (1 - 3)  aluminum hydroxide/magnesium hydroxide/simethicone Suspension 30 milliLiter(s) Oral every 4 hours PRN Dyspepsia  hydrALAZINE 10 milliGRAM(s) Oral three times a day PRN Systolic blood pressure > 170  melatonin 3 milliGRAM(s) Oral at bedtime PRN Insomnia  ondansetron Injectable 4 milliGRAM(s) IV Push every 8 hours PRN Nausea and/or Vomiting  polyethylene glycol 3350 17 Gram(s) Oral two times a day PRN Constipation  traMADol 50 milliGRAM(s) Oral three times a day PRN Moderate Pain (4 - 6)      OBJECTIVE    T(C): 36.8 (22 @ 13:52), Max: 37.1 (22 @ 19:38)  HR: 88 (22 @ 13:52) (83 - 96)  BP: 147/78 (22 @ 13:52) (98/65 - 179/79)  RR: 16 (22 @ 13:52) (16 - 18)  SpO2: 98% (22 @ 13:52) (97% - 100%)  Wt(kg): --  I&O's Summary    2022 07:01  -  2022 18:54  --------------------------------------------------------  IN: 0 mL / OUT: 1500 mL / NET: -1500 mL          REVIEW OF SYSTEMS:  CONSTITUTIONAL: No fever, weight loss, or fatigue  EYES: No eye pain, visual disturbances, or discharge  ENMT:   No sinus or throat pain  NECK: No pain or stiffness  RESPIRATORY: No cough, wheezing, chills or hemoptysis; No shortness of breath  CARDIOVASCULAR: No chest pain, palpitations, dizziness, or leg swelling  GASTROINTESTINAL: No abdominal pain. No nausea, vomiting; No diarrhea or constipation. No melena or hematochezia.  GENITOURINARY: No dysuria, frequency, hematuria, or incontinence  NEUROLOGICAL: No headaches, memory loss, loss of strength, numbness, or tremors  SKIN: No itching, burning, rashes, or lesions   MUSCULOSKELETAL: No joint pain or swelling; No muscle, back, or extremity pain    PHYSICAL EXAM:  Appearance: NAD. VS past 24 hrs -as above   HEENT:   Moist oral mucosa. Conjunctiva clear b/l.   Neck : supple  Respiratory: Lungs CTAB.  Gastrointestinal:  Soft, nontender. No rebound. No rigidity. BS present	  Cardiovascular: RRR ,S1S2 present  Neurologic: Non-focal. Moving all extremities.  Extremities: No edema. No erythema. No calf tenderness.  Skin: No rashes, No ecchymoses, No cyanosis.	  wounds ,skin lesions-See skin assesment flow sheet   LABS:                        9.6    15.10 )-----------( 463      ( 2022 09:58 )             29.7     02-18    134<L>  |  103  |  53<H>  ----------------------------<  94  5.6<H>   |  22  |  7.80<H>    Ca    8.6      2022 09:58  Phos  4.9     02-18  Mg     3.7     02-17    TPro  7.8  /  Alb  3.1<L>  /  TBili  0.5  /  DBili  x   /  AST  11<L>  /  ALT  11<L>  /  AlkPhos  95  02-18    CAPILLARY BLOOD GLUCOSE      POCT Blood Glucose.: 311 mg/dL (2022 16:53)  POCT Blood Glucose.: 143 mg/dL (2022 12:36)  POCT Blood Glucose.: 109 mg/dL (2022 07:51)  POCT Blood Glucose.: 149 mg/dL (2022 21:43)    PT/INR - ( 2022 11:32 )   PT: 12.1 sec;   INR: 1.04 ratio         PTT - ( 2022 11:32 )  PTT:34.4 sec  Urinalysis Basic - ( 2022 11:32 )    Color: Yellow / Appearance: Clear / S.010 / pH: x  Gluc: x / Ketone: Negative  / Bili: Negative / Urobili: Negative   Blood: x / Protein: 100 / Nitrite: Negative   Leuk Esterase: Trace / RBC: 0-2 /HPF / WBC 11-25   Sq Epi: x / Non Sq Epi: Occasional / Bacteria: Few        RADIOLOGY & ADDITIONAL TESTS:   reviewed elctronically  ASSESSMENT/PLAN: 	  25 minutes aggregate time was spent on this visit, 50% visit time spent in care co-ordination with other attendings and counselling patient .I have discussed care plan with patient / HCP/family member ,who expressed understanding of problems treatment and their effect and side effects, alternatives in details. I have asked if they have any questions and concerns and appropriately addressed them to best of my ability.

## 2022-02-19 DIAGNOSIS — D72.829 ELEVATED WHITE BLOOD CELL COUNT, UNSPECIFIED: ICD-10-CM

## 2022-02-19 LAB
ANION GAP SERPL CALC-SCNC: 8 MMOL/L — SIGNIFICANT CHANGE UP (ref 5–17)
BUN SERPL-MCNC: 41 MG/DL — HIGH (ref 7–23)
CALCIUM SERPL-MCNC: 8.6 MG/DL — SIGNIFICANT CHANGE UP (ref 8.5–10.1)
CHLORIDE SERPL-SCNC: 98 MMOL/L — SIGNIFICANT CHANGE UP (ref 96–108)
CO2 SERPL-SCNC: 28 MMOL/L — SIGNIFICANT CHANGE UP (ref 22–31)
CREAT SERPL-MCNC: 5.9 MG/DL — HIGH (ref 0.5–1.3)
GLUCOSE SERPL-MCNC: 187 MG/DL — HIGH (ref 70–99)
HCT VFR BLD CALC: 31.3 % — LOW (ref 39–50)
HGB BLD-MCNC: 10 G/DL — LOW (ref 13–17)
MCHC RBC-ENTMCNC: 29.5 PG — SIGNIFICANT CHANGE UP (ref 27–34)
MCHC RBC-ENTMCNC: 31.9 GM/DL — LOW (ref 32–36)
MCV RBC AUTO: 92.3 FL — SIGNIFICANT CHANGE UP (ref 80–100)
NRBC # BLD: 0 /100 WBCS — SIGNIFICANT CHANGE UP (ref 0–0)
PLATELET # BLD AUTO: 487 K/UL — HIGH (ref 150–400)
POTASSIUM SERPL-MCNC: 4.4 MMOL/L — SIGNIFICANT CHANGE UP (ref 3.5–5.3)
POTASSIUM SERPL-SCNC: 4.4 MMOL/L — SIGNIFICANT CHANGE UP (ref 3.5–5.3)
RBC # BLD: 3.39 M/UL — LOW (ref 4.2–5.8)
RBC # FLD: 16.2 % — HIGH (ref 10.3–14.5)
SODIUM SERPL-SCNC: 134 MMOL/L — LOW (ref 135–145)
WBC # BLD: 12.51 K/UL — HIGH (ref 3.8–10.5)
WBC # FLD AUTO: 12.51 K/UL — HIGH (ref 3.8–10.5)

## 2022-02-19 RX ORDER — GABAPENTIN 400 MG/1
100 CAPSULE ORAL
Refills: 0 | Status: DISCONTINUED | OUTPATIENT
Start: 2022-02-19 | End: 2022-02-22

## 2022-02-19 RX ADMIN — GABAPENTIN 100 MILLIGRAM(S): 400 CAPSULE ORAL at 18:07

## 2022-02-19 RX ADMIN — HEPARIN SODIUM 5000 UNIT(S): 5000 INJECTION INTRAVENOUS; SUBCUTANEOUS at 05:25

## 2022-02-19 RX ADMIN — Medication 1: at 08:22

## 2022-02-19 RX ADMIN — Medication 2: at 17:19

## 2022-02-19 RX ADMIN — LOSARTAN POTASSIUM 100 MILLIGRAM(S): 100 TABLET, FILM COATED ORAL at 05:32

## 2022-02-19 RX ADMIN — HEPARIN SODIUM 5000 UNIT(S): 5000 INJECTION INTRAVENOUS; SUBCUTANEOUS at 18:07

## 2022-02-19 RX ADMIN — PANTOPRAZOLE SODIUM 40 MILLIGRAM(S): 20 TABLET, DELAYED RELEASE ORAL at 05:32

## 2022-02-19 RX ADMIN — Medication 1: at 12:26

## 2022-02-19 RX ADMIN — AMLODIPINE BESYLATE 10 MILLIGRAM(S): 2.5 TABLET ORAL at 05:25

## 2022-02-19 NOTE — PROGRESS NOTE ADULT - SUBJECTIVE AND OBJECTIVE BOX
DARIEN MCGINNIS    PLV 1EAS 112 W1    Allergies    No Known Allergies    Intolerances        PAST MEDICAL & SURGICAL HISTORY:  DM (diabetes mellitus)    HTN (hypertension)    Chronic kidney disease, unspecified CKD stage    ESRD on dialysis    Pneumonia    Sepsis    Anemia    No significant past surgical history        FAMILY HISTORY:      Home Medications:      MEDICATIONS  (STANDING):  amLODIPine   Tablet 10 milliGRAM(s) Oral daily  dextrose 40% Gel 15 Gram(s) Oral once  dextrose 5%. 1000 milliLiter(s) (50 mL/Hr) IV Continuous <Continuous>  dextrose 5%. 1000 milliLiter(s) (100 mL/Hr) IV Continuous <Continuous>  dextrose 50% Injectable 25 Gram(s) IV Push once  dextrose 50% Injectable 12.5 Gram(s) IV Push once  dextrose 50% Injectable 25 Gram(s) IV Push once  glucagon  Injectable 1 milliGRAM(s) IntraMuscular once  heparin   Injectable 5000 Unit(s) SubCutaneous every 12 hours  insulin lispro (ADMELOG) corrective regimen sliding scale   SubCutaneous three times a day before meals  insulin lispro (ADMELOG) corrective regimen sliding scale   SubCutaneous at bedtime  losartan 100 milliGRAM(s) Oral daily  pantoprazole    Tablet 40 milliGRAM(s) Oral before breakfast    MEDICATIONS  (PRN):  acetaminophen     Tablet .. 650 milliGRAM(s) Oral every 6 hours PRN Temp greater or equal to 38C (100.4F), Mild Pain (1 - 3)  aluminum hydroxide/magnesium hydroxide/simethicone Suspension 30 milliLiter(s) Oral every 4 hours PRN Dyspepsia  hydrALAZINE 10 milliGRAM(s) Oral three times a day PRN Systolic blood pressure > 170  melatonin 3 milliGRAM(s) Oral at bedtime PRN Insomnia  ondansetron Injectable 4 milliGRAM(s) IV Push every 8 hours PRN Nausea and/or Vomiting  polyethylene glycol 3350 17 Gram(s) Oral two times a day PRN Constipation  traMADol 50 milliGRAM(s) Oral three times a day PRN Moderate Pain (4 - 6)      Diet, Consistent Carbohydrate Renal w/Evening Snack:   Supplement Feeding Modality:  Oral  Nepro Cans or Servings Per Day:  1       Frequency:  Two Times a day (22 @ 09:18) [Active]  Diet, NPO after Midnight:      NPO Start Date: 2022,   NPO Start Time: 23:59  Except Medications (02-17-22 @ 20:13) [Active]          Vital Signs Last 24 Hrs  T(C): 37.1 (2022 05:21), Max: 37.1 (2022 05:21)  T(F): 98.7 (2022 05:21), Max: 98.7 (2022 05:21)  HR: 79 (2022 05:21) (79 - 94)  BP: 156/80 (2022 05:21) (98/65 - 156/80)  BP(mean): --  RR: 18 (2022 05:21) (16 - 18)  SpO2: 97% (2022 05:21) (97% - 100%)      22 @ 07:01  -  22 @ 07:00  --------------------------------------------------------  IN: 0 mL / OUT: 1500 mL / NET: -1500 mL              LABS:                        9.6    15.10 )-----------( 463      ( 2022 09:58 )             29.7         134<L>  |  103  |  53<H>  ----------------------------<  94  5.6<H>   |  22  |  7.80<H>    Ca    8.6      2022 09:58  Phos  4.9     -  Mg     3.7         TPro  7.8  /  Alb  3.1<L>  /  TBili  0.5  /  DBili  x   /  AST  11<L>  /  ALT  11<L>  /  AlkPhos  95  -18    PT/INR - ( 2022 11:32 )   PT: 12.1 sec;   INR: 1.04 ratio         PTT - ( 2022 11:32 )  PTT:34.4 sec  Urinalysis Basic - ( 2022 11:32 )    Color: Yellow / Appearance: Clear / S.010 / pH: x  Gluc: x / Ketone: Negative  / Bili: Negative / Urobili: Negative   Blood: x / Protein: 100 / Nitrite: Negative   Leuk Esterase: Trace / RBC: 0-2 /HPF / WBC 11-25   Sq Epi: x / Non Sq Epi: Occasional / Bacteria: Few            WBC:  WBC Count: 15.10 K/uL ( @ 09:58)  WBC Count: 13.86 K/uL ( @ 08:12)  WBC Count: 14.83 K/uL ( @ 11:32)      MICROBIOLOGY:  RECENT CULTURES:              PT/INR - ( 2022 11:32 )   PT: 12.1 sec;   INR: 1.04 ratio         PTT - ( 2022 11:32 )  PTT:34.4 sec    Sodium:  Sodium, Serum: 134 mmol/L ( @ 09:58)  Sodium, Serum: 134 mmol/L ( @ 08:12)  Sodium, Serum: 136 mmol/L (:32)      7.80 mg/dL :58  7.90 mg/dL  @ 08:12  8.40 mg/dL :32      Hemoglobin:  Hemoglobin: 9.6 g/dL ( @ 09:58)  Hemoglobin: 9.7 g/dL ( @ 08:12)  Hemoglobin: 10.0 g/dL ( @ 11:32)      Platelets: Platelet Count - Automated: 463 K/uL ( @ 09:58)  Platelet Count - Automated: 470 K/uL ( @ 08:12)  Platelet Count - Automated: 479 K/uL ( @ 11:32)      LIVER FUNCTIONS - ( 2022 09:58 )  Alb: 3.1 g/dL / Pro: 7.8 g/dL / ALK PHOS: 95 U/L / ALT: 11 U/L / AST: 11 U/L / GGT: x             Urinalysis Basic - ( 2022 11:32 )    Color: Yellow / Appearance: Clear / S.010 / pH: x  Gluc: x / Ketone: Negative  / Bili: Negative / Urobili: Negative   Blood: x / Protein: 100 / Nitrite: Negative   Leuk Esterase: Trace / RBC: 0-2 /HPF / WBC 11-25   Sq Epi: x / Non Sq Epi: Occasional / Bacteria: Few        RADIOLOGY & ADDITIONAL STUDIES:      MICROBIOLOGY:  RECENT CULTURES:

## 2022-02-19 NOTE — PROGRESS NOTE ADULT - SUBJECTIVE AND OBJECTIVE BOX
Patient is a 66y Male with a known history of :  DM (diabetes mellitus) [E11.9]    HTN (hypertension) [I10]    ESRD on dialysis [N18.6]    Prophylactic measure [Z29.9]      HPI:  Patient  is a 65yo male with pmhx of DM, HTN, ANEMIA, CKD STAGE V ON DIALYSIS TWICE WEEKLY ( last session on tuesday in [facility]) , S/P PNA/SEPSIS 12/21 IN [facility], METABOLIC ACIDOSIS SINCE RESOLVED presents to ED as per Dr Yoder referral  for dialysis , pt was recently diagnosed with CKD in 12/21 while living in [facility]. pt at this time had a Cr of 9 and was placed on dialysis twice weekly (last time 2/15 [place] time/ 2/[place] ago . pt had repeat labs prior ot leaving [facility] showing cr of 7.8. pt currently reports bilateral  le pain with leg swelling and perkins . pt recently took a flight from [facility] which took aprox ~30 hours. pt without other complaints. As per family  prior to moving back pt was taken off metformin for dm due to renal dysfunction. Patient makes urine.  pt denies fever, cp, sob, n/v/d, abd pain. pt is not currently covid vaccinated but would like to receive the vaccine when stable .Patient developed HTN with  in ER and cardiology eval was requested .Norvasc started by  and HD session ordered , IR procedure ordered and Rt IJ  non tunneled dialysis catheter placed, tip in svc .Admitted to med floor for further management . (17 Feb 2022 17:25)      REVIEW OF SYSTEMS:    CONSTITUTIONAL: No fever, weight loss, or fatigue  EYES: No eye pain, visual disturbances, or discharge  ENMT:  No difficulty hearing, tinnitus, vertigo; No sinus or throat pain  NECK: No pain or stiffness  BREASTS: No pain, masses, or nipple discharge  RESPIRATORY: No cough, wheezing, chills or hemoptysis; No shortness of breath  CARDIOVASCULAR: No chest pain, palpitations, dizziness, or leg swelling  GASTROINTESTINAL: No abdominal or epigastric pain. No nausea, vomiting, or hematemesis; No diarrhea or constipation. No melena or hematochezia.  GENITOURINARY: No dysuria, frequency, hematuria, or incontinence  NEUROLOGICAL: No headaches, memory loss, loss of strength, numbness, or tremors  SKIN: No itching, burning, rashes, or lesions   LYMPH NODES: No enlarged glands  ENDOCRINE: No heat or cold intolerance; No hair loss  MUSCULOSKELETAL: No joint pain or swelling; No muscle, back, or extremity pain  PSYCHIATRIC: No depression, anxiety, mood swings, or difficulty sleeping  HEME/LYMPH: No easy bruising, or bleeding gums  ALLERGY AND IMMUNOLOGIC: No hives or eczema    MEDICATIONS  (STANDING):  amLODIPine   Tablet 10 milliGRAM(s) Oral daily  dextrose 40% Gel 15 Gram(s) Oral once  dextrose 5%. 1000 milliLiter(s) (50 mL/Hr) IV Continuous <Continuous>  dextrose 5%. 1000 milliLiter(s) (100 mL/Hr) IV Continuous <Continuous>  dextrose 50% Injectable 25 Gram(s) IV Push once  dextrose 50% Injectable 12.5 Gram(s) IV Push once  dextrose 50% Injectable 25 Gram(s) IV Push once  glucagon  Injectable 1 milliGRAM(s) IntraMuscular once  heparin   Injectable 5000 Unit(s) SubCutaneous every 12 hours  insulin lispro (ADMELOG) corrective regimen sliding scale   SubCutaneous three times a day before meals  insulin lispro (ADMELOG) corrective regimen sliding scale   SubCutaneous at bedtime  losartan 100 milliGRAM(s) Oral daily  pantoprazole    Tablet 40 milliGRAM(s) Oral before breakfast    MEDICATIONS  (PRN):  acetaminophen     Tablet .. 650 milliGRAM(s) Oral every 6 hours PRN Temp greater or equal to 38C (100.4F), Mild Pain (1 - 3)  aluminum hydroxide/magnesium hydroxide/simethicone Suspension 30 milliLiter(s) Oral every 4 hours PRN Dyspepsia  hydrALAZINE 10 milliGRAM(s) Oral three times a day PRN Systolic blood pressure > 170  melatonin 3 milliGRAM(s) Oral at bedtime PRN Insomnia  ondansetron Injectable 4 milliGRAM(s) IV Push every 8 hours PRN Nausea and/or Vomiting  polyethylene glycol 3350 17 Gram(s) Oral two times a day PRN Constipation  traMADol 50 milliGRAM(s) Oral three times a day PRN Moderate Pain (4 - 6)      ALLERGIES: No Known Allergies      FAMILY HISTORY:      PHYSICAL EXAMINATION:  -----------------------------  T(C): 37.1 (02-19-22 @ 05:21), Max: 37.1 (02-19-22 @ 05:21)  HR: 79 (02-19-22 @ 05:21) (79 - 94)  BP: 156/80 (02-19-22 @ 05:21) (98/65 - 156/80)  RR: 18 (02-19-22 @ 05:21) (16 - 18)  SpO2: 97% (02-19-22 @ 05:21) (97% - 100%)  Wt(kg): --    02-18 @ 07:01  -  02-19 @ 06:52  --------------------------------------------------------  IN:  Total IN: 0 mL    OUT:    Other (mL): 1500 mL  Total OUT: 1500 mL    Total NET: -1500 mL            VITALS  T(C): 37.1 (02-19-22 @ 05:21), Max: 37.1 (02-19-22 @ 05:21)  HR: 79 (02-19-22 @ 05:21) (79 - 94)  BP: 156/80 (02-19-22 @ 05:21) (98/65 - 156/80)  RR: 18 (02-19-22 @ 05:21) (16 - 18)  SpO2: 97% (02-19-22 @ 05:21) (97% - 100%)    Constitutional: well developed, normal appearance, well groomed, well nourished, no deformities and no acute distress.   Eyes: the conjunctiva exhibited no abnormalities and the eyelids demonstrated no xanthelasmas.   HEENT: normal oral mucosa, no oral pallor and no oral cyanosis.   Neck: normal jugular venous A waves present, normal jugular venous V waves present and no jugular venous peraza A waves.   Pulmonary: no respiratory distress, normal respiratory rhythm and effort, no accessory muscle use and lungs were clear to auscultation bilaterally.   Cardiovascular: heart rate and rhythm were normal, normal S1 and S2 and no murmur, gallop, rub, heave or thrill are present.   Abdomen: soft, non-tender, no hepato-splenomegaly and no abdominal mass palpated.   Musculoskeletal: the gait could not be assessed..   Extremities: no clubbing of the fingernails, no localized cyanosis, no petechial hemorrhages and no ischemic changes.   Skin: normal skin color and pigmentation, no rash, no venous stasis, no skin lesions, no skin ulcer and no xanthoma was observed.   Psychiatric: oriented to person, place, and time, the affect was normal, the mood was normal and not feeling anxious.     LABS:   --------  02-18    134<L>  |  103  |  53<H>  ----------------------------<  94  5.6<H>   |  22  |  7.80<H>    Ca    8.6      18 Feb 2022 09:58  Phos  4.9     02-18  Mg     3.7     02-17    TPro  7.8  /  Alb  3.1<L>  /  TBili  0.5  /  DBili  x   /  AST  11<L>  /  ALT  11<L>  /  AlkPhos  95  02-18                         9.6    15.10 )-----------( 463      ( 18 Feb 2022 09:58 )             29.7     PT/INR - ( 17 Feb 2022 11:32 )   PT: 12.1 sec;   INR: 1.04 ratio         PTT - ( 17 Feb 2022 11:32 )  PTT:34.4 sec  02-18 @ 08:12 BNP: 3574 pg/mL            RADIOLOGY:  -----------------    ECG:     ECHO:

## 2022-02-19 NOTE — PROGRESS NOTE ADULT - SUBJECTIVE AND OBJECTIVE BOX
PROGRESS NOTE  Patient is a 66y old  Male who presents with a chief complaint of sent to ED for HD (19 Feb 2022 09:22)  Chart and available morning labs /imaging are reviewed electronically , urgent issues addressed . More information  is being added upon completion of rounds , when more information is collected and management discussed with consultants , medical staff and social service/case management on the floor   OVERNIGHT  Patient is resting in a bed comfortably . .No distress noted .Awaiting for perm cath placement by IR ,as per Dr Yoedr IR requested ID clearance for procedure due to WBC elevation .D/W Dr. Johnson -patient is cleared   No new issues reported by medical staff . All above noted   HPI:  Patient  is a 67yo male with pmhx of DM, HTN, ANEMIA, CKD STAGE V ON DIALYSIS TWICE WEEKLY ( last session on tuesday in [facility]) , S/P PNA/SEPSIS 12/21 IN [facility], METABOLIC ACIDOSIS SINCE RESOLVED presents to ED as per Dr Yoder referral  for dialysis , pt was recently diagnosed with CKD in 12/21 while living in [facility]. pt at this time had a Cr of 9 and was placed on dialysis twice weekly (last time 2/15 [facility] time/ 2/14 [facility] time). [place] ago . pt had repeat labs prior ot leaving [facility] showing cr of 7.8. pt currently reports bilateral  le pain with leg swelling and perkins . pt recently took a flight from [facility] which took aprox ~30 hours. pt without other complaints. As per family  prior to moving back pt was taken off metformin for dm due to renal dysfunction. Patient makes urine.  pt denies fever, cp, sob, n/v/d, abd pain. pt is not currently covid vaccinated but would like to receive the vaccine when stable .Patient developed HTN with  in ER and cardiology eval was requested .Norvasc started by  and HD session ordered , IR procedure ordered and Rt IJ  non tunneled dialysis catheter placed, tip in svc .Admitted to med floor for further management . (17 Feb 2022 17:25)    PAST MEDICAL & SURGICAL HISTORY:  Chronic kidney disease, unspecified CKD stage    Pneumonia    Sepsis    Anemia    DM (diabetes mellitus)    HTN (hypertension)    ESRD on dialysis    No significant past surgical history        MEDICATIONS  (STANDING):  amLODIPine   Tablet 10 milliGRAM(s) Oral daily  dextrose 40% Gel 15 Gram(s) Oral once  dextrose 5%. 1000 milliLiter(s) (50 mL/Hr) IV Continuous <Continuous>  dextrose 5%. 1000 milliLiter(s) (100 mL/Hr) IV Continuous <Continuous>  dextrose 50% Injectable 25 Gram(s) IV Push once  dextrose 50% Injectable 12.5 Gram(s) IV Push once  dextrose 50% Injectable 25 Gram(s) IV Push once  gabapentin 100 milliGRAM(s) Oral two times a day  glucagon  Injectable 1 milliGRAM(s) IntraMuscular once  heparin   Injectable 5000 Unit(s) SubCutaneous every 12 hours  insulin lispro (ADMELOG) corrective regimen sliding scale   SubCutaneous three times a day before meals  insulin lispro (ADMELOG) corrective regimen sliding scale   SubCutaneous at bedtime  losartan 100 milliGRAM(s) Oral daily  pantoprazole    Tablet 40 milliGRAM(s) Oral before breakfast    MEDICATIONS  (PRN):  acetaminophen     Tablet .. 650 milliGRAM(s) Oral every 6 hours PRN Temp greater or equal to 38C (100.4F), Mild Pain (1 - 3)  aluminum hydroxide/magnesium hydroxide/simethicone Suspension 30 milliLiter(s) Oral every 4 hours PRN Dyspepsia  hydrALAZINE 10 milliGRAM(s) Oral three times a day PRN Systolic blood pressure > 170  melatonin 3 milliGRAM(s) Oral at bedtime PRN Insomnia  ondansetron Injectable 4 milliGRAM(s) IV Push every 8 hours PRN Nausea and/or Vomiting  polyethylene glycol 3350 17 Gram(s) Oral two times a day PRN Constipation  traMADol 50 milliGRAM(s) Oral three times a day PRN Moderate Pain (4 - 6)      OBJECTIVE    T(C): 37.1 (02-19-22 @ 13:42), Max: 37.1 (02-19-22 @ 05:21)  HR: 79 (02-19-22 @ 13:42) (79 - 83)  BP: 122/80 (02-19-22 @ 13:42) (122/80 - 156/80)  RR: 17 (02-19-22 @ 13:42) (17 - 18)  SpO2: 96% (02-19-22 @ 13:42) (96% - 97%)  Wt(kg): --  I&O's Summary    18 Feb 2022 07:01  -  19 Feb 2022 07:00  --------------------------------------------------------  IN: 0 mL / OUT: 1500 mL / NET: -1500 mL          REVIEW OF SYSTEMS:  CONSTITUTIONAL: No fever, weight loss, or fatigue  EYES: No eye pain, visual disturbances, or discharge  ENMT:   No sinus or throat pain  NECK: No pain or stiffness  RESPIRATORY: No cough, wheezing, chills or hemoptysis; No shortness of breath  CARDIOVASCULAR: No chest pain, palpitations, dizziness, or leg swelling  GASTROINTESTINAL: No abdominal pain. No nausea, vomiting; No diarrhea or constipation. No melena or hematochezia.  GENITOURINARY: No dysuria, frequency, hematuria, or incontinence  NEUROLOGICAL: No headaches, memory loss, loss of strength, numbness, or tremors  SKIN: No itching, burning, rashes, or lesions   MUSCULOSKELETAL: No joint pain or swelling; No muscle, back, or extremity pain    PHYSICAL EXAM:  Appearance: NAD. VS past 24 hrs -as above   HEENT:   Moist oral mucosa. Conjunctiva clear b/l.   Neck : supple  Respiratory: Lungs CTAB.  Gastrointestinal:  Soft, nontender. No rebound. No rigidity. BS present	  Cardiovascular: RRR ,S1S2 present  Neurologic: Non-focal. Moving all extremities.  Extremities: No edema. No erythema. No calf tenderness.  Skin: No rashes, No ecchymoses, No cyanosis.	  wounds ,skin lesions-See skin assesment flow sheet   LABS:                        10.0   12.51 )-----------( 487      ( 19 Feb 2022 09:33 )             31.3     02-19    134<L>  |  98  |  41<H>  ----------------------------<  187<H>  4.4   |  28  |  5.90<H>    Ca    8.6      19 Feb 2022 09:33  Phos  4.9     02-18    TPro  7.8  /  Alb  3.1<L>  /  TBili  0.5  /  DBili  x   /  AST  11<L>  /  ALT  11<L>  /  AlkPhos  95  02-18    CAPILLARY BLOOD GLUCOSE      POCT Blood Glucose.: 190 mg/dL (19 Feb 2022 12:05)  POCT Blood Glucose.: 171 mg/dL (19 Feb 2022 07:44)  POCT Blood Glucose.: 156 mg/dL (18 Feb 2022 21:15)  POCT Blood Glucose.: 311 mg/dL (18 Feb 2022 16:53)          Culture - Blood (collected 18 Feb 2022 12:17)  Source: .Blood Blood  Preliminary Report (19 Feb 2022 13:01):    No growth to date.    Culture - Blood (collected 18 Feb 2022 12:17)  Source: .Blood Blood  Preliminary Report (19 Feb 2022 13:01):    No growth to date.      RADIOLOGY & ADDITIONAL TESTS:   reviewed elctronically  ASSESSMENT/PLAN: 	    25 minutes aggregate time was spent on this visit, 50% visit time spent in care co-ordination with other attendings and counselling patient .I have discussed care plan with patient / HCP/family member ,who expressed understanding of problems treatment and their effect and side effects, alternatives in details. I have asked if they have any questions and concerns and appropriately addressed them to best of my ability.

## 2022-02-19 NOTE — PROGRESS NOTE ADULT - SUBJECTIVE AND OBJECTIVE BOX
Patient is a 66y Male whom presented to the hospital with esrd on hd     PAST MEDICAL & SURGICAL HISTORY:  DM (diabetes mellitus)    HTN (hypertension)    Chronic kidney disease, unspecified CKD stage    ESRD on dialysis    Pneumonia    Sepsis    Anemia    No significant past surgical history        MEDICATIONS  (STANDING):  amLODIPine   Tablet 5 milliGRAM(s) Oral daily  amLODIPine   Tablet 5 milliGRAM(s) Oral once  dextrose 40% Gel 15 Gram(s) Oral once  dextrose 5%. 1000 milliLiter(s) (50 mL/Hr) IV Continuous <Continuous>  dextrose 5%. 1000 milliLiter(s) (100 mL/Hr) IV Continuous <Continuous>  dextrose 50% Injectable 25 Gram(s) IV Push once  dextrose 50% Injectable 12.5 Gram(s) IV Push once  dextrose 50% Injectable 25 Gram(s) IV Push once  glucagon  Injectable 1 milliGRAM(s) IntraMuscular once  insulin lispro (ADMELOG) corrective regimen sliding scale   SubCutaneous three times a day before meals  insulin lispro (ADMELOG) corrective regimen sliding scale   SubCutaneous at bedtime  pantoprazole    Tablet 40 milliGRAM(s) Oral before breakfast      Allergies    No Known Allergies    Intolerances        SOCIAL HISTORY:  Denies ETOh,Smoking,     FAMILY HISTORY:      REVIEW OF SYSTEMS:    CONSTITUTIONAL: No weakness, fevers or chills  RESPIRATORY: No cough, wheezing, hemoptysis; No shortness of breath  CARDIOVASCULAR: No chest pain or palpitations  GASTROINTESTINAL: No abdominal or epigastric pain. No nausea, vomiting,     No diarrhea or constipation. No melena                                                     10.0   12.51 )-----------( 487      ( 19 Feb 2022 09:33 )             31.3       CBC Full  -  ( 19 Feb 2022 09:33 )  WBC Count : 12.51 K/uL  RBC Count : 3.39 M/uL  Hemoglobin : 10.0 g/dL  Hematocrit : 31.3 %  Platelet Count - Automated : 487 K/uL  Mean Cell Volume : 92.3 fl  Mean Cell Hemoglobin : 29.5 pg  Mean Cell Hemoglobin Concentration : 31.9 gm/dL  Auto Neutrophil # : x  Auto Lymphocyte # : x  Auto Monocyte # : x  Auto Eosinophil # : x  Auto Basophil # : x  Auto Neutrophil % : x  Auto Lymphocyte % : x  Auto Monocyte % : x  Auto Eosinophil % : x  Auto Basophil % : x      02-19    134<L>  |  98  |  41<H>  ----------------------------<  187<H>  4.4   |  28  |  5.90<H>    Ca    8.6      19 Feb 2022 09:33  Phos  4.9     02-18    TPro  7.8  /  Alb  3.1<L>  /  TBili  0.5  /  DBili  x   /  AST  11<L>  /  ALT  11<L>  /  AlkPhos  95  02-18      CAPILLARY BLOOD GLUCOSE      POCT Blood Glucose.: 190 mg/dL (19 Feb 2022 12:05)  POCT Blood Glucose.: 171 mg/dL (19 Feb 2022 07:44)  POCT Blood Glucose.: 156 mg/dL (18 Feb 2022 21:15)  POCT Blood Glucose.: 311 mg/dL (18 Feb 2022 16:53)      Vital Signs Last 24 Hrs  T(C): 37.1 (19 Feb 2022 05:21), Max: 37.1 (19 Feb 2022 05:21)  T(F): 98.7 (19 Feb 2022 05:21), Max: 98.7 (19 Feb 2022 05:21)  HR: 79 (19 Feb 2022 05:21) (79 - 88)  BP: 156/80 (19 Feb 2022 05:21) (128/73 - 156/80)  BP(mean): --  RR: 18 (19 Feb 2022 05:21) (16 - 18)  SpO2: 97% (19 Feb 2022 05:21) (97% - 98%)            PT/INR - ( 17 Feb 2022 11:32 )   PT: 12.1 sec;   INR: 1.04 ratio         PTT - ( 17 Feb 2022 11:32 )  PTT:34.4 sec        PHYSICAL EXAM:    Constitutional: NAD  HEENT: conjunctive   clear   Neck:  No JVD  Respiratory: CTAB  Cardiovascular: S1 and S2  Gastrointestinal: BS+, soft, NT/ND  Extremities: No peripheral edema

## 2022-02-19 NOTE — PROGRESS NOTE ADULT - SUBJECTIVE AND OBJECTIVE BOX
Interval History:    CENTRAL LINE:   [  ] YES       [  ] NO  ELIAS:                 [  ] YES       [  ] NO         REVIEW OF SYSTEMS:  All Systems below were reviewed and are negative [  ]  HEENT:  ID:  Pulmonary:  Cardiac:  GI:  Renal:  Musculoskeletal:  All other systems above were reviewed and are negative   [  ]      MEDICATIONS  (STANDING):  amLODIPine   Tablet 10 milliGRAM(s) Oral daily  dextrose 40% Gel 15 Gram(s) Oral once  dextrose 5%. 1000 milliLiter(s) (50 mL/Hr) IV Continuous <Continuous>  dextrose 5%. 1000 milliLiter(s) (100 mL/Hr) IV Continuous <Continuous>  dextrose 50% Injectable 25 Gram(s) IV Push once  dextrose 50% Injectable 12.5 Gram(s) IV Push once  dextrose 50% Injectable 25 Gram(s) IV Push once  gabapentin 100 milliGRAM(s) Oral two times a day  glucagon  Injectable 1 milliGRAM(s) IntraMuscular once  heparin   Injectable 5000 Unit(s) SubCutaneous every 12 hours  insulin lispro (ADMELOG) corrective regimen sliding scale   SubCutaneous three times a day before meals  insulin lispro (ADMELOG) corrective regimen sliding scale   SubCutaneous at bedtime  losartan 100 milliGRAM(s) Oral daily  pantoprazole    Tablet 40 milliGRAM(s) Oral before breakfast    MEDICATIONS  (PRN):  acetaminophen     Tablet .. 650 milliGRAM(s) Oral every 6 hours PRN Temp greater or equal to 38C (100.4F), Mild Pain (1 - 3)  aluminum hydroxide/magnesium hydroxide/simethicone Suspension 30 milliLiter(s) Oral every 4 hours PRN Dyspepsia  hydrALAZINE 10 milliGRAM(s) Oral three times a day PRN Systolic blood pressure > 170  melatonin 3 milliGRAM(s) Oral at bedtime PRN Insomnia  ondansetron Injectable 4 milliGRAM(s) IV Push every 8 hours PRN Nausea and/or Vomiting  polyethylene glycol 3350 17 Gram(s) Oral two times a day PRN Constipation  traMADol 50 milliGRAM(s) Oral three times a day PRN Moderate Pain (4 - 6)      Vital Signs Last 24 Hrs  T(C): 37.1 (19 Feb 2022 13:42), Max: 37.1 (19 Feb 2022 05:21)  T(F): 98.8 (19 Feb 2022 13:42), Max: 98.8 (19 Feb 2022 13:42)  HR: 79 (19 Feb 2022 13:42) (79 - 83)  BP: 122/80 (19 Feb 2022 13:42) (122/80 - 156/80)  BP(mean): --  RR: 17 (19 Feb 2022 13:42) (17 - 18)  SpO2: 96% (19 Feb 2022 13:42) (96% - 97%)    I&O's Summary    18 Feb 2022 07:01  -  19 Feb 2022 07:00  --------------------------------------------------------  IN: 0 mL / OUT: 1500 mL / NET: -1500 mL        PHYSICAL EXAM:  HEENT: NC/AT; PERRLA  Neck: Soft; no tenderness  Lungs: CTA bilaterally; no wheezing.   Heart:  Abdomen:  Genital/ Rectal:  Extremities:  Neurologic:  Vascular:      LABORATORY:    CBC Full  -  ( 19 Feb 2022 09:33 )  WBC Count : 12.51 K/uL  RBC Count : 3.39 M/uL  Hemoglobin : 10.0 g/dL  Hematocrit : 31.3 %  Platelet Count - Automated : 487 K/uL  Mean Cell Volume : 92.3 fl  Mean Cell Hemoglobin : 29.5 pg  Mean Cell Hemoglobin Concentration : 31.9 gm/dL  Auto Neutrophil # : x  Auto Lymphocyte # : x  Auto Monocyte # : x  Auto Eosinophil # : x  Auto Basophil # : x  Auto Neutrophil % : x  Auto Lymphocyte % : x  Auto Monocyte % : x  Auto Eosinophil % : x  Auto Basophil % : x      ESR:                   02-18 @ 08:12  --    C-Reactive Protein:     02-18 @ 08:12  --    Procalcitonin:           02-18 @ 08:12   0.30      02-19    134<L>  |  98  |  41<H>  ----------------------------<  187<H>  4.4   |  28  |  5.90<H>    Ca    8.6      19 Feb 2022 09:33  Phos  4.9     02-18    TPro  7.8  /  Alb  3.1<L>  /  TBili  0.5  /  DBili  x   /  AST  11<L>  /  ALT  11<L>  /  AlkPhos  95  02-18          Assessment and Plan:          Jeffrey Johnson MD   [phone].  He is afebrile  Comfortable         MEDICATIONS  (STANDING):  amLODIPine   Tablet 10 milliGRAM(s) Oral daily  dextrose 40% Gel 15 Gram(s) Oral once  dextrose 5%. 1000 milliLiter(s) (50 mL/Hr) IV Continuous <Continuous>  dextrose 5%. 1000 milliLiter(s) (100 mL/Hr) IV Continuous <Continuous>  dextrose 50% Injectable 25 Gram(s) IV Push once  dextrose 50% Injectable 12.5 Gram(s) IV Push once  dextrose 50% Injectable 25 Gram(s) IV Push once  gabapentin 100 milliGRAM(s) Oral two times a day  glucagon  Injectable 1 milliGRAM(s) IntraMuscular once  heparin   Injectable 5000 Unit(s) SubCutaneous every 12 hours  insulin lispro (ADMELOG) corrective regimen sliding scale   SubCutaneous three times a day before meals  insulin lispro (ADMELOG) corrective regimen sliding scale   SubCutaneous at bedtime  losartan 100 milliGRAM(s) Oral daily  pantoprazole    Tablet 40 milliGRAM(s) Oral before breakfast    MEDICATIONS  (PRN):  acetaminophen     Tablet .. 650 milliGRAM(s) Oral every 6 hours PRN Temp greater or equal to 38C (100.4F), Mild Pain (1 - 3)  aluminum hydroxide/magnesium hydroxide/simethicone Suspension 30 milliLiter(s) Oral every 4 hours PRN Dyspepsia  hydrALAZINE 10 milliGRAM(s) Oral three times a day PRN Systolic blood pressure > 170  melatonin 3 milliGRAM(s) Oral at bedtime PRN Insomnia  ondansetron Injectable 4 milliGRAM(s) IV Push every 8 hours PRN Nausea and/or Vomiting  polyethylene glycol 3350 17 Gram(s) Oral two times a day PRN Constipation  traMADol 50 milliGRAM(s) Oral three times a day PRN Moderate Pain (4 - 6)      Vital Signs Last 24 Hrs  T(C): 37.1 (19 Feb 2022 13:42), Max: 37.1 (19 Feb 2022 05:21)  T(F): 98.8 (19 Feb 2022 13:42), Max: 98.8 (19 Feb 2022 13:42)  HR: 79 (19 Feb 2022 13:42) (79 - 83)  BP: 122/80 (19 Feb 2022 13:42) (122/80 - 156/80)  BP(mean): --  RR: 17 (19 Feb 2022 13:42) (17 - 18)  SpO2: 96% (19 Feb 2022 13:42) (96% - 97%)    I&O's Summary    18 Feb 2022 07:01  -  19 Feb 2022 07:00  --------------------------------------------------------  IN: 0 mL / OUT: 1500 mL / NET: -1500 mL      PHYSICAL EXAM:  HEENT: NC/AT; PERRLA  Neck: Soft; no tenderness  Lungs: CTA bilaterally; no wheezing.   Heart: RRR, no murmurs.  Abdomen: Soft, no tenderness.   Genital/ Rectal: No griffith.   Extremities: No ulcers.   Neurologic: Awake,       LABORATORY:    CBC Full  -  ( 19 Feb 2022 09:33 )  WBC Count : 12.51 K/uL  RBC Count : 3.39 M/uL  Hemoglobin : 10.0 g/dL  Hematocrit : 31.3 %  Platelet Count - Automated : 487 K/uL  Mean Cell Volume : 92.3 fl  Mean Cell Hemoglobin : 29.5 pg  Mean Cell Hemoglobin Concentration : 31.9 gm/dL  Auto Neutrophil # : x  Auto Lymphocyte # : x  Auto Monocyte # : x  Auto Eosinophil # : x  Auto Basophil # : x  Auto Neutrophil % : x  Auto Lymphocyte % : x  Auto Monocyte % : x  Auto Eosinophil % : x  Auto Basophil % : x      Procalcitonin:           02-18 @ 08:12   0.30      02-19    134<L>  |  98  |  41<H>  ----------------------------<  187<H>  4.4   |  28  |  5.90<H>    Ca    8.6      19 Feb 2022 09:33  Phos  4.9     02-18    TPro  7.8  /  Alb  3.1<L>  /  TBili  0.5  /  DBili  x   /  AST  11<L>  /  ALT  11<L>  /  AlkPhos  95  02-18      Assessment and Plan:    1. Leukocytosis  2. ESRD on HD.    No active infections noted.  CT of chest, abdomen and pelvis was negative for infections. Mild elevation of PCT 0.3 was likely due to ESRD.   Monitor off antibiotics.  He is cleared for  permacath placement.   HD as per nephrology.      Jeffrey Johnson MD   [phone].

## 2022-02-20 RX ORDER — CALCIUM ACETATE 667 MG
667 TABLET ORAL
Refills: 0 | Status: DISCONTINUED | OUTPATIENT
Start: 2022-02-20 | End: 2022-02-22

## 2022-02-20 RX ORDER — CALCITRIOL 0.5 UG/1
0.25 CAPSULE ORAL DAILY
Refills: 0 | Status: DISCONTINUED | OUTPATIENT
Start: 2022-02-20 | End: 2022-02-22

## 2022-02-20 RX ORDER — FERROUS SULFATE 325(65) MG
325 TABLET ORAL DAILY
Refills: 0 | Status: DISCONTINUED | OUTPATIENT
Start: 2022-02-20 | End: 2022-02-22

## 2022-02-20 RX ADMIN — LOSARTAN POTASSIUM 100 MILLIGRAM(S): 100 TABLET, FILM COATED ORAL at 06:55

## 2022-02-20 RX ADMIN — Medication 2: at 17:21

## 2022-02-20 RX ADMIN — AMLODIPINE BESYLATE 10 MILLIGRAM(S): 2.5 TABLET ORAL at 06:55

## 2022-02-20 RX ADMIN — PANTOPRAZOLE SODIUM 40 MILLIGRAM(S): 20 TABLET, DELAYED RELEASE ORAL at 06:55

## 2022-02-20 RX ADMIN — Medication 2: at 12:05

## 2022-02-20 RX ADMIN — HEPARIN SODIUM 5000 UNIT(S): 5000 INJECTION INTRAVENOUS; SUBCUTANEOUS at 17:21

## 2022-02-20 RX ADMIN — GABAPENTIN 100 MILLIGRAM(S): 400 CAPSULE ORAL at 17:21

## 2022-02-20 RX ADMIN — GABAPENTIN 100 MILLIGRAM(S): 400 CAPSULE ORAL at 06:57

## 2022-02-20 RX ADMIN — HEPARIN SODIUM 5000 UNIT(S): 5000 INJECTION INTRAVENOUS; SUBCUTANEOUS at 06:55

## 2022-02-20 NOTE — PROGRESS NOTE ADULT - SUBJECTIVE AND OBJECTIVE BOX
DARIEN MCGINNIS    PLV 1EAS 112 W1    Allergies    No Known Allergies    Intolerances        PAST MEDICAL & SURGICAL HISTORY:  DM (diabetes mellitus)    HTN (hypertension)    Chronic kidney disease, unspecified CKD stage    ESRD on dialysis    Pneumonia    Sepsis    Anemia    No significant past surgical history        FAMILY HISTORY:      Home Medications:      MEDICATIONS  (STANDING):  amLODIPine   Tablet 10 milliGRAM(s) Oral daily  dextrose 40% Gel 15 Gram(s) Oral once  dextrose 5%. 1000 milliLiter(s) (50 mL/Hr) IV Continuous <Continuous>  dextrose 5%. 1000 milliLiter(s) (100 mL/Hr) IV Continuous <Continuous>  dextrose 50% Injectable 25 Gram(s) IV Push once  dextrose 50% Injectable 12.5 Gram(s) IV Push once  dextrose 50% Injectable 25 Gram(s) IV Push once  gabapentin 100 milliGRAM(s) Oral two times a day  glucagon  Injectable 1 milliGRAM(s) IntraMuscular once  heparin   Injectable 5000 Unit(s) SubCutaneous every 12 hours  insulin lispro (ADMELOG) corrective regimen sliding scale   SubCutaneous three times a day before meals  insulin lispro (ADMELOG) corrective regimen sliding scale   SubCutaneous at bedtime  losartan 100 milliGRAM(s) Oral daily  pantoprazole    Tablet 40 milliGRAM(s) Oral before breakfast    MEDICATIONS  (PRN):  acetaminophen     Tablet .. 650 milliGRAM(s) Oral every 6 hours PRN Temp greater or equal to 38C (100.4F), Mild Pain (1 - 3)  aluminum hydroxide/magnesium hydroxide/simethicone Suspension 30 milliLiter(s) Oral every 4 hours PRN Dyspepsia  hydrALAZINE 10 milliGRAM(s) Oral three times a day PRN Systolic blood pressure > 170  melatonin 3 milliGRAM(s) Oral at bedtime PRN Insomnia  ondansetron Injectable 4 milliGRAM(s) IV Push every 8 hours PRN Nausea and/or Vomiting  polyethylene glycol 3350 17 Gram(s) Oral two times a day PRN Constipation  traMADol 50 milliGRAM(s) Oral three times a day PRN Moderate Pain (4 - 6)      Diet, Consistent Carbohydrate Renal w/Evening Snack:   Supplement Feeding Modality:  Oral  Nepro Cans or Servings Per Day:  1       Frequency:  Two Times a day (02-18-22 @ 09:18) [Active]          Vital Signs Last 24 Hrs  T(C): 37.1 (20 Feb 2022 05:26), Max: 37.1 (19 Feb 2022 13:42)  T(F): 98.7 (20 Feb 2022 05:26), Max: 98.8 (19 Feb 2022 13:42)  HR: 61 (20 Feb 2022 05:26) (61 - 79)  BP: 158/74 (20 Feb 2022 05:26) (122/80 - 158/74)  BP(mean): --  RR: 17 (20 Feb 2022 05:26) (17 - 17)  SpO2: 97% (20 Feb 2022 05:26) (96% - 97%)              LABS:                        10.0   12.51 )-----------( 487      ( 19 Feb 2022 09:33 )             31.3     02-19    134<L>  |  98  |  41<H>  ----------------------------<  187<H>  4.4   |  28  |  5.90<H>    Ca    8.6      19 Feb 2022 09:33  Phos  4.9     02-18    TPro  7.8  /  Alb  3.1<L>  /  TBili  0.5  /  DBili  x   /  AST  11<L>  /  ALT  11<L>  /  AlkPhos  95  02-18              WBC:  WBC Count: 12.51 K/uL (02-19 @ 09:33)  WBC Count: 15.10 K/uL (02-18 @ 09:58)  WBC Count: 13.86 K/uL (02-18 @ 08:12)  WBC Count: 14.83 K/uL (02-17 @ 11:32)      MICROBIOLOGY:  RECENT CULTURES:  02-18 .Blood Blood XXXX XXXX   No growth to date.                    Sodium:  Sodium, Serum: 134 mmol/L (02-19 @ 09:33)  Sodium, Serum: 134 mmol/L (02-18 @ 09:58)  Sodium, Serum: 134 mmol/L (02-18 @ 08:12)  Sodium, Serum: 136 mmol/L (02-17 @ 11:32)      5.90 mg/dL 02-19 @ 09:33  7.80 mg/dL 02-18 @ 09:58  7.90 mg/dL 02-18 @ 08:12  8.40 mg/dL 02-17 @ 11:32      Hemoglobin:  Hemoglobin: 10.0 g/dL (02-19 @ 09:33)  Hemoglobin: 9.6 g/dL (02-18 @ 09:58)  Hemoglobin: 9.7 g/dL (02-18 @ 08:12)  Hemoglobin: 10.0 g/dL (02-17 @ 11:32)      Platelets: Platelet Count - Automated: 487 K/uL (02-19 @ 09:33)  Platelet Count - Automated: 463 K/uL (02-18 @ 09:58)  Platelet Count - Automated: 470 K/uL (02-18 @ 08:12)  Platelet Count - Automated: 479 K/uL (02-17 @ 11:32)      LIVER FUNCTIONS - ( 18 Feb 2022 09:58 )  Alb: 3.1 g/dL / Pro: 7.8 g/dL / ALK PHOS: 95 U/L / ALT: 11 U/L / AST: 11 U/L / GGT: x                 RADIOLOGY & ADDITIONAL STUDIES:      MICROBIOLOGY:  RECENT CULTURES:  02-18 .Blood Blood XXXX XXXX   No growth to date.

## 2022-02-20 NOTE — PROGRESS NOTE ADULT - SUBJECTIVE AND OBJECTIVE BOX
Patient is a 66y Male with a known history of :  DM (diabetes mellitus) [E11.9]    HTN (hypertension) [I10]    ESRD on dialysis [N18.6]    Prophylactic measure [Z29.9]    Leucocytosis [D72.829]      HPI:  Patient  is a 67yo male with pmhx of DM, HTN, ANEMIA, CKD STAGE V ON DIALYSIS TWICE WEEKLY ( last session on tuesday in [facility]) , S/P PNA/SEPSIS 12/21 IN [facility], METABOLIC ACIDOSIS SINCE RESOLVED presents to ED as per Dr Yoder referral  for dialysis , pt was recently diagnosed with CKD in 12/21 while living in [facility]. pt at this time had a Cr of 9 and was placed on dialysis twice weekly (last time 2/15 [facility] [place] . pt had repeat labs prior ot leaving [facility] showing cr of 7.8. pt currently reports bilateral  le pain with leg swelling and perkins . pt recently took a flight from [facility] which took aprox ~30 hours. pt without other complaints. As per family  prior to moving back pt was taken off metformin for dm due to renal dysfunction. Patient makes urine.  pt denies fever, cp, sob, n/v/d, abd pain. pt is not currently covid vaccinated but would like to receive the vaccine when stable .Patient developed HTN with  in ER and cardiology eval was requested .Norvasc started by  and HD session ordered , IR procedure ordered and Rt IJ  non tunneled dialysis catheter placed, tip in svc .Admitted to med floor for further management . (17 Feb 2022 17:25)      REVIEW OF SYSTEMS:    CONSTITUTIONAL: No fever, weight loss, or fatigue  EYES: No eye pain, visual disturbances, or discharge  ENMT:  No difficulty hearing, tinnitus, vertigo; No sinus or throat pain  NECK: No pain or stiffness  BREASTS: No pain, masses, or nipple discharge  RESPIRATORY: No cough, wheezing, chills or hemoptysis; No shortness of breath  CARDIOVASCULAR: No chest pain, palpitations, dizziness, or leg swelling  GASTROINTESTINAL: No abdominal or epigastric pain. No nausea, vomiting, or hematemesis; No diarrhea or constipation. No melena or hematochezia.  GENITOURINARY: No dysuria, frequency, hematuria, or incontinence  NEUROLOGICAL: No headaches, memory loss, loss of strength, numbness, or tremors  SKIN: No itching, burning, rashes, or lesions   LYMPH NODES: No enlarged glands  ENDOCRINE: No heat or cold intolerance; No hair loss  MUSCULOSKELETAL: No joint pain or swelling; No muscle, back, or extremity pain  PSYCHIATRIC: No depression, anxiety, mood swings, or difficulty sleeping  HEME/LYMPH: No easy bruising, or bleeding gums  ALLERGY AND IMMUNOLOGIC: No hives or eczema    MEDICATIONS  (STANDING):  amLODIPine   Tablet 10 milliGRAM(s) Oral daily  dextrose 40% Gel 15 Gram(s) Oral once  dextrose 5%. 1000 milliLiter(s) (50 mL/Hr) IV Continuous <Continuous>  dextrose 5%. 1000 milliLiter(s) (100 mL/Hr) IV Continuous <Continuous>  dextrose 50% Injectable 25 Gram(s) IV Push once  dextrose 50% Injectable 12.5 Gram(s) IV Push once  dextrose 50% Injectable 25 Gram(s) IV Push once  gabapentin 100 milliGRAM(s) Oral two times a day  glucagon  Injectable 1 milliGRAM(s) IntraMuscular once  heparin   Injectable 5000 Unit(s) SubCutaneous every 12 hours  insulin lispro (ADMELOG) corrective regimen sliding scale   SubCutaneous three times a day before meals  insulin lispro (ADMELOG) corrective regimen sliding scale   SubCutaneous at bedtime  losartan 100 milliGRAM(s) Oral daily  pantoprazole    Tablet 40 milliGRAM(s) Oral before breakfast    MEDICATIONS  (PRN):  acetaminophen     Tablet .. 650 milliGRAM(s) Oral every 6 hours PRN Temp greater or equal to 38C (100.4F), Mild Pain (1 - 3)  aluminum hydroxide/magnesium hydroxide/simethicone Suspension 30 milliLiter(s) Oral every 4 hours PRN Dyspepsia  hydrALAZINE 10 milliGRAM(s) Oral three times a day PRN Systolic blood pressure > 170  melatonin 3 milliGRAM(s) Oral at bedtime PRN Insomnia  ondansetron Injectable 4 milliGRAM(s) IV Push every 8 hours PRN Nausea and/or Vomiting  polyethylene glycol 3350 17 Gram(s) Oral two times a day PRN Constipation  traMADol 50 milliGRAM(s) Oral three times a day PRN Moderate Pain (4 - 6)      ALLERGIES: No Known Allergies      FAMILY HISTORY:      PHYSICAL EXAMINATION:  -----------------------------  T(C): 37.1 (02-20-22 @ 05:26), Max: 37.1 (02-19-22 @ 13:42)  HR: 61 (02-20-22 @ 05:26) (61 - 79)  BP: 158/74 (02-20-22 @ 05:26) (122/80 - 158/74)  RR: 17 (02-20-22 @ 05:26) (17 - 17)  SpO2: 97% (02-20-22 @ 05:26) (96% - 97%)  Wt(kg): --        VITALS  T(C): 37.1 (02-20-22 @ 05:26), Max: 37.1 (02-19-22 @ 13:42)  HR: 61 (02-20-22 @ 05:26) (61 - 79)  BP: 158/74 (02-20-22 @ 05:26) (122/80 - 158/74)  RR: 17 (02-20-22 @ 05:26) (17 - 17)  SpO2: 97% (02-20-22 @ 05:26) (96% - 97%)    Constitutional: well developed, normal appearance, well groomed, well nourished, no deformities and no acute distress.   Eyes: the conjunctiva exhibited no abnormalities and the eyelids demonstrated no xanthelasmas.   HEENT: normal oral mucosa, no oral pallor and no oral cyanosis.   Neck: normal jugular venous A waves present, normal jugular venous V waves present and no jugular venous peraza A waves.   Pulmonary: no respiratory distress, normal respiratory rhythm and effort, no accessory muscle use and lungs were clear to auscultation bilaterally.   Cardiovascular: heart rate and rhythm were normal, normal S1 and S2 and no murmur, gallop, rub, heave or thrill are present.   Abdomen: soft, non-tender, no hepato-splenomegaly and no abdominal mass palpated.   Musculoskeletal: the gait could not be assessed..   Extremities: no clubbing of the fingernails, no localized cyanosis, no petechial hemorrhages and no ischemic changes.   Skin: normal skin color and pigmentation, no rash, no venous stasis, no skin lesions, no skin ulcer and no xanthoma was observed.   Psychiatric: oriented to person, place, and time, the affect was normal, the mood was normal and not feeling anxious.     LABS:   --------  02-19    134<L>  |  98  |  41<H>  ----------------------------<  187<H>  4.4   |  28  |  5.90<H>    Ca    8.6      19 Feb 2022 09:33                           10.0   12.51 )-----------( 487      ( 19 Feb 2022 09:33 )             31.3             Culture Results:   No growth to date. (02-18 @ 12:17)  Culture Results:   No growth to date. (02-18 @ 12:17)      RADIOLOGY:  -----------------    ECG:     ECHO:

## 2022-02-20 NOTE — PROGRESS NOTE ADULT - SUBJECTIVE AND OBJECTIVE BOX
PROGRESS NOTE  Patient is a 66y old  Male who presents with a chief complaint of sent to ED for HD (20 Feb 2022 10:25)  Chart and available morning labs /imaging are reviewed electronically , urgent issues addressed . More information  is being added upon completion of rounds , when more information is collected and management discussed with consultants , medical staff and social service/case management on the floor   OVERNIGHT  No new issues reported by medical staff . All above noted Patient is resting in a bed comfortably .Confused ,poor mentation .No distress noted   HPI:  Patient  is a 67yo male with pmhx of DM, HTN, ANEMIA, CKD STAGE V ON DIALYSIS TWICE WEEKLY ( last session on tuesday in [facility]) , S/P PNA/SEPSIS 12/21 IN [facility], METABOLIC ACIDOSIS SINCE RESOLVED presents to ED as per Dr Yoder referral  for dialysis , pt was recently diagnosed with CKD in 12/21 while living in [facility]. pt at this time had a Cr of 9 and was placed on dialysis twice weekly (last time 2/15 [facility] . pt had repeat labs prior ot leaving [facility] showing cr of 7.8. pt currently reports bilateral  le pain with leg swelling and perkins . pt recently took a flight from [facility] which took aprox ~30 hours. pt without other complaints. As per family  prior to moving back pt was taken off metformin for dm due to renal dysfunction. Patient makes urine.  pt denies fever, cp, sob, n/v/d, abd pain. pt is not currently covid vaccinated but would like to receive the vaccine when stable .Patient developed HTN with  in ER and cardiology eval was requested .Norvasc started by  and HD session ordered , IR procedure ordered and Rt IJ  non tunneled dialysis catheter placed, tip in svc .Admitted to med floor for further management . (17 Feb 2022 17:25)    PAST MEDICAL & SURGICAL HISTORY:  DM (diabetes mellitus)    HTN (hypertension)    Chronic kidney disease, unspecified CKD stage    ESRD on dialysis    Pneumonia    Sepsis    Anemia    No significant past surgical history        MEDICATIONS  (STANDING):  amLODIPine   Tablet 10 milliGRAM(s) Oral daily  dextrose 40% Gel 15 Gram(s) Oral once  dextrose 5%. 1000 milliLiter(s) (50 mL/Hr) IV Continuous <Continuous>  dextrose 5%. 1000 milliLiter(s) (100 mL/Hr) IV Continuous <Continuous>  dextrose 50% Injectable 25 Gram(s) IV Push once  dextrose 50% Injectable 12.5 Gram(s) IV Push once  dextrose 50% Injectable 25 Gram(s) IV Push once  gabapentin 100 milliGRAM(s) Oral two times a day  glucagon  Injectable 1 milliGRAM(s) IntraMuscular once  heparin   Injectable 5000 Unit(s) SubCutaneous every 12 hours  insulin lispro (ADMELOG) corrective regimen sliding scale   SubCutaneous three times a day before meals  insulin lispro (ADMELOG) corrective regimen sliding scale   SubCutaneous at bedtime  losartan 100 milliGRAM(s) Oral daily  pantoprazole    Tablet 40 milliGRAM(s) Oral before breakfast    MEDICATIONS  (PRN):  acetaminophen     Tablet .. 650 milliGRAM(s) Oral every 6 hours PRN Temp greater or equal to 38C (100.4F), Mild Pain (1 - 3)  aluminum hydroxide/magnesium hydroxide/simethicone Suspension 30 milliLiter(s) Oral every 4 hours PRN Dyspepsia  hydrALAZINE 10 milliGRAM(s) Oral three times a day PRN Systolic blood pressure > 170  melatonin 3 milliGRAM(s) Oral at bedtime PRN Insomnia  ondansetron Injectable 4 milliGRAM(s) IV Push every 8 hours PRN Nausea and/or Vomiting  polyethylene glycol 3350 17 Gram(s) Oral two times a day PRN Constipation  traMADol 50 milliGRAM(s) Oral three times a day PRN Moderate Pain (4 - 6)      OBJECTIVE    T(C): 37.1 (02-20-22 @ 05:26), Max: 37.1 (02-19-22 @ 13:42)  HR: 61 (02-20-22 @ 05:26) (61 - 79)  BP: 158/74 (02-20-22 @ 05:26) (122/80 - 158/74)  RR: 17 (02-20-22 @ 05:26) (17 - 17)  SpO2: 97% (02-20-22 @ 05:26) (96% - 97%)  Wt(kg): --  I&O's Summary        REVIEW OF SYSTEMS:  CONSTITUTIONAL: No fever, weight loss, or fatigue  EYES: No eye pain, visual disturbances, or discharge  ENMT:   No sinus or throat pain  NECK: No pain or stiffness  RESPIRATORY: No cough, wheezing, chills or hemoptysis; No shortness of breath  CARDIOVASCULAR: No chest pain, palpitations, dizziness, or leg swelling  GASTROINTESTINAL: No abdominal pain. No nausea, vomiting; No diarrhea or constipation. No melena or hematochezia.  GENITOURINARY: No dysuria, frequency, hematuria, or incontinence  NEUROLOGICAL: No headaches, memory loss, loss of strength, numbness, or tremors  SKIN: No itching, burning, rashes, or lesions   MUSCULOSKELETAL: No joint pain or swelling; No muscle, back, or extremity pain    PHYSICAL EXAM:  Appearance: NAD. VS past 24 hrs -as above   HEENT:   Moist oral mucosa. Conjunctiva clear b/l.   Neck : supple  Respiratory: Lungs CTAB.  Gastrointestinal:  Soft, nontender. No rebound. No rigidity. BS present	  Cardiovascular: RRR ,S1S2 present  Neurologic: Non-focal. Moving all extremities.  Extremities: No edema. No erythema. No calf tenderness.  Skin: No rashes, No ecchymoses, No cyanosis.	  wounds ,skin lesions-See skin assesment flow sheet   LABS:                        10.0   12.51 )-----------( 487      ( 19 Feb 2022 09:33 )             31.3     02-19    134<L>  |  98  |  41<H>  ----------------------------<  187<H>  4.4   |  28  |  5.90<H>    Ca    8.6      19 Feb 2022 09:33      CAPILLARY BLOOD GLUCOSE      POCT Blood Glucose.: 109 mg/dL (20 Feb 2022 07:47)  POCT Blood Glucose.: 201 mg/dL (19 Feb 2022 21:48)  POCT Blood Glucose.: 232 mg/dL (19 Feb 2022 16:46)  POCT Blood Glucose.: 190 mg/dL (19 Feb 2022 12:05)          Culture - Blood (collected 18 Feb 2022 12:17)  Source: .Blood Blood  Preliminary Report (19 Feb 2022 13:01):    No growth to date.    Culture - Blood (collected 18 Feb 2022 12:17)  Source: .Blood Blood  Preliminary Report (19 Feb 2022 13:01):    No growth to date.      RADIOLOGY & ADDITIONAL TESTS:   reviewed elctronically  ASSESSMENT/PLAN: 	  25 minutes aggregate time was spent on this visit, 50% visit time spent in care co-ordination with other attendings and counselling patient .I have discussed care plan with patient / HCP/family member ,who expressed understanding of problems treatment and their effect and side effects, alternatives in details. I have asked if they have any questions and concerns and appropriately addressed them to best of my ability.

## 2022-02-20 NOTE — PROGRESS NOTE ADULT - SUBJECTIVE AND OBJECTIVE BOX
Patient is a 66y Male whom presented to the hospital with esrd on hd     PAST MEDICAL & SURGICAL HISTORY:  DM (diabetes mellitus)    HTN (hypertension)    Chronic kidney disease, unspecified CKD stage    ESRD on dialysis    Pneumonia    Sepsis    Anemia    No significant past surgical history        MEDICATIONS  (STANDING):  amLODIPine   Tablet 5 milliGRAM(s) Oral daily  amLODIPine   Tablet 5 milliGRAM(s) Oral once  dextrose 40% Gel 15 Gram(s) Oral once  dextrose 5%. 1000 milliLiter(s) (50 mL/Hr) IV Continuous <Continuous>  dextrose 5%. 1000 milliLiter(s) (100 mL/Hr) IV Continuous <Continuous>  dextrose 50% Injectable 25 Gram(s) IV Push once  dextrose 50% Injectable 12.5 Gram(s) IV Push once  dextrose 50% Injectable 25 Gram(s) IV Push once  glucagon  Injectable 1 milliGRAM(s) IntraMuscular once  insulin lispro (ADMELOG) corrective regimen sliding scale   SubCutaneous three times a day before meals  insulin lispro (ADMELOG) corrective regimen sliding scale   SubCutaneous at bedtime  pantoprazole    Tablet 40 milliGRAM(s) Oral before breakfast      Allergies    No Known Allergies    Intolerances        SOCIAL HISTORY:  Denies ETOh,Smoking,     FAMILY HISTORY:      REVIEW OF SYSTEMS:    CONSTITUTIONAL: No weakness, fevers or chills  RESPIRATORY: No cough, wheezing, hemoptysis; No shortness of breath  CARDIOVASCULAR: No chest pain or palpitations  GASTROINTESTINAL: No abdominal or epigastric pain. No nausea, vomiting,     No diarrhea or constipation. No melena                                        10.0   12.51 )-----------( 487      ( 19 Feb 2022 09:33 )             31.3       CBC Full  -  ( 19 Feb 2022 09:33 )  WBC Count : 12.51 K/uL  RBC Count : 3.39 M/uL  Hemoglobin : 10.0 g/dL  Hematocrit : 31.3 %  Platelet Count - Automated : 487 K/uL  Mean Cell Volume : 92.3 fl  Mean Cell Hemoglobin : 29.5 pg  Mean Cell Hemoglobin Concentration : 31.9 gm/dL  Auto Neutrophil # : x  Auto Lymphocyte # : x  Auto Monocyte # : x  Auto Eosinophil # : x  Auto Basophil # : x  Auto Neutrophil % : x  Auto Lymphocyte % : x  Auto Monocyte % : x  Auto Eosinophil % : x  Auto Basophil % : x      02-19    134<L>  |  98  |  41<H>  ----------------------------<  187<H>  4.4   |  28  |  5.90<H>    Ca    8.6      19 Feb 2022 09:33        CAPILLARY BLOOD GLUCOSE      POCT Blood Glucose.: 205 mg/dL (20 Feb 2022 16:44)  POCT Blood Glucose.: 240 mg/dL (20 Feb 2022 11:55)  POCT Blood Glucose.: 109 mg/dL (20 Feb 2022 07:47)  POCT Blood Glucose.: 201 mg/dL (19 Feb 2022 21:48)      Vital Signs Last 24 Hrs  T(C): 36.7 (20 Feb 2022 13:53), Max: 37.1 (20 Feb 2022 05:26)  T(F): 98 (20 Feb 2022 13:53), Max: 98.7 (20 Feb 2022 05:26)  HR: 77 (20 Feb 2022 13:53) (61 - 77)  BP: 134/68 (20 Feb 2022 13:53) (131/77 - 158/74)  BP(mean): --  RR: 17 (20 Feb 2022 13:53) (17 - 17)  SpO2: 95% (20 Feb 2022 13:53) (95% - 97%)                PT/INR - ( 17 Feb 2022 11:32 )   PT: 12.1 sec;   INR: 1.04 ratio         PTT - ( 17 Feb 2022 11:32 )  PTT:34.4 sec        PHYSICAL EXAM:    Constitutional: NAD  HEENT: conjunctive   clear   Neck:  No JVD  Respiratory: CTAB  Cardiovascular: S1 and S2  Gastrointestinal: BS+, soft, NT/ND  Extremities: No peripheral edema

## 2022-02-21 LAB
ANION GAP SERPL CALC-SCNC: 10 MMOL/L — SIGNIFICANT CHANGE UP (ref 5–17)
BUN SERPL-MCNC: 66 MG/DL — HIGH (ref 7–23)
CALCIUM SERPL-MCNC: 8.8 MG/DL — SIGNIFICANT CHANGE UP (ref 8.5–10.1)
CHLORIDE SERPL-SCNC: 102 MMOL/L — SIGNIFICANT CHANGE UP (ref 96–108)
CO2 SERPL-SCNC: 26 MMOL/L — SIGNIFICANT CHANGE UP (ref 22–31)
CREAT SERPL-MCNC: 8 MG/DL — HIGH (ref 0.5–1.3)
GLUCOSE SERPL-MCNC: 105 MG/DL — HIGH (ref 70–99)
HCT VFR BLD CALC: 29.3 % — LOW (ref 39–50)
HGB BLD-MCNC: 9.3 G/DL — LOW (ref 13–17)
MCHC RBC-ENTMCNC: 29.4 PG — SIGNIFICANT CHANGE UP (ref 27–34)
MCHC RBC-ENTMCNC: 31.7 GM/DL — LOW (ref 32–36)
MCV RBC AUTO: 92.7 FL — SIGNIFICANT CHANGE UP (ref 80–100)
NRBC # BLD: 0 /100 WBCS — SIGNIFICANT CHANGE UP (ref 0–0)
PLATELET # BLD AUTO: 454 K/UL — HIGH (ref 150–400)
POTASSIUM SERPL-MCNC: 5.2 MMOL/L — SIGNIFICANT CHANGE UP (ref 3.5–5.3)
POTASSIUM SERPL-SCNC: 5.2 MMOL/L — SIGNIFICANT CHANGE UP (ref 3.5–5.3)
RBC # BLD: 3.16 M/UL — LOW (ref 4.2–5.8)
RBC # FLD: 16 % — HIGH (ref 10.3–14.5)
SARS-COV-2 RNA SPEC QL NAA+PROBE: SIGNIFICANT CHANGE UP
SODIUM SERPL-SCNC: 138 MMOL/L — SIGNIFICANT CHANGE UP (ref 135–145)
WBC # BLD: 12.28 K/UL — HIGH (ref 3.8–10.5)
WBC # FLD AUTO: 12.28 K/UL — HIGH (ref 3.8–10.5)

## 2022-02-21 RX ADMIN — Medication 4: at 12:14

## 2022-02-21 RX ADMIN — GABAPENTIN 100 MILLIGRAM(S): 400 CAPSULE ORAL at 05:13

## 2022-02-21 RX ADMIN — Medication 667 MILLIGRAM(S): at 17:38

## 2022-02-21 RX ADMIN — HEPARIN SODIUM 5000 UNIT(S): 5000 INJECTION INTRAVENOUS; SUBCUTANEOUS at 17:37

## 2022-02-21 RX ADMIN — PANTOPRAZOLE SODIUM 40 MILLIGRAM(S): 20 TABLET, DELAYED RELEASE ORAL at 05:13

## 2022-02-21 RX ADMIN — Medication 667 MILLIGRAM(S): at 09:11

## 2022-02-21 RX ADMIN — GABAPENTIN 100 MILLIGRAM(S): 400 CAPSULE ORAL at 17:37

## 2022-02-21 RX ADMIN — LOSARTAN POTASSIUM 100 MILLIGRAM(S): 100 TABLET, FILM COATED ORAL at 05:13

## 2022-02-21 RX ADMIN — CALCITRIOL 0.25 MICROGRAM(S): 0.5 CAPSULE ORAL at 12:58

## 2022-02-21 RX ADMIN — Medication 325 MILLIGRAM(S): at 12:58

## 2022-02-21 RX ADMIN — HEPARIN SODIUM 5000 UNIT(S): 5000 INJECTION INTRAVENOUS; SUBCUTANEOUS at 05:14

## 2022-02-21 RX ADMIN — AMLODIPINE BESYLATE 10 MILLIGRAM(S): 2.5 TABLET ORAL at 05:13

## 2022-02-21 RX ADMIN — Medication 667 MILLIGRAM(S): at 12:58

## 2022-02-21 NOTE — PROGRESS NOTE ADULT - SUBJECTIVE AND OBJECTIVE BOX
DARIEN MCGINNIS    PLV 1EAS 112 W1    Allergies    No Known Allergies    Intolerances        PAST MEDICAL & SURGICAL HISTORY:  DM (diabetes mellitus)    HTN (hypertension)    Chronic kidney disease, unspecified CKD stage    ESRD on dialysis    Pneumonia    Sepsis    Anemia    No significant past surgical history        FAMILY HISTORY:      Home Medications:      MEDICATIONS  (STANDING):  amLODIPine   Tablet 10 milliGRAM(s) Oral daily  calcitriol   Capsule 0.25 MICROGram(s) Oral daily  calcium acetate 667 milliGRAM(s) Oral three times a day with meals  dextrose 40% Gel 15 Gram(s) Oral once  dextrose 5%. 1000 milliLiter(s) (50 mL/Hr) IV Continuous <Continuous>  dextrose 5%. 1000 milliLiter(s) (100 mL/Hr) IV Continuous <Continuous>  dextrose 50% Injectable 25 Gram(s) IV Push once  dextrose 50% Injectable 12.5 Gram(s) IV Push once  dextrose 50% Injectable 25 Gram(s) IV Push once  ferrous    sulfate 325 milliGRAM(s) Oral daily  gabapentin 100 milliGRAM(s) Oral two times a day  glucagon  Injectable 1 milliGRAM(s) IntraMuscular once  heparin   Injectable 5000 Unit(s) SubCutaneous every 12 hours  insulin lispro (ADMELOG) corrective regimen sliding scale   SubCutaneous three times a day before meals  insulin lispro (ADMELOG) corrective regimen sliding scale   SubCutaneous at bedtime  losartan 100 milliGRAM(s) Oral daily  pantoprazole    Tablet 40 milliGRAM(s) Oral before breakfast    MEDICATIONS  (PRN):  acetaminophen     Tablet .. 650 milliGRAM(s) Oral every 6 hours PRN Temp greater or equal to 38C (100.4F), Mild Pain (1 - 3)  aluminum hydroxide/magnesium hydroxide/simethicone Suspension 30 milliLiter(s) Oral every 4 hours PRN Dyspepsia  hydrALAZINE 10 milliGRAM(s) Oral three times a day PRN Systolic blood pressure > 170  melatonin 3 milliGRAM(s) Oral at bedtime PRN Insomnia  ondansetron Injectable 4 milliGRAM(s) IV Push every 8 hours PRN Nausea and/or Vomiting  polyethylene glycol 3350 17 Gram(s) Oral two times a day PRN Constipation  traMADol 50 milliGRAM(s) Oral three times a day PRN Moderate Pain (4 - 6)      Diet, Consistent Carbohydrate Renal w/Evening Snack:   Supplement Feeding Modality:  Oral  Nepro Cans or Servings Per Day:  1       Frequency:  Two Times a day (02-18-22 @ 09:18) [Active]          Vital Signs Last 24 Hrs  T(C): 36.6 (21 Feb 2022 05:00), Max: 36.7 (20 Feb 2022 13:53)  T(F): 97.9 (21 Feb 2022 05:00), Max: 98.1 (20 Feb 2022 19:52)  HR: 73 (21 Feb 2022 05:00) (73 - 77)  BP: 159/68 (21 Feb 2022 05:00) (120/71 - 159/68)  BP(mean): --  RR: 17 (21 Feb 2022 05:00) (17 - 18)  SpO2: 95% (21 Feb 2022 05:00) (95% - 98%)              LABS:                        9.3    12.28 )-----------( 454      ( 21 Feb 2022 09:04 )             29.3     02-21    138  |  102  |  66<H>  ----------------------------<  105<H>  5.2   |  26  |  8.00<H>    Ca    8.8      21 Feb 2022 09:04                WBC:  WBC Count: 12.28 K/uL (02-21 @ 09:04)  WBC Count: 12.51 K/uL (02-19 @ 09:33)  WBC Count: 15.10 K/uL (02-18 @ 09:58)  WBC Count: 13.86 K/uL (02-18 @ 08:12)  WBC Count: 14.83 K/uL (02-17 @ 11:32)      MICROBIOLOGY:  RECENT CULTURES:  02-18 .Blood Blood XXXX XXXX   No growth to date.                    Sodium:  Sodium, Serum: 138 mmol/L (02-21 @ 09:04)  Sodium, Serum: 134 mmol/L (02-19 @ 09:33)  Sodium, Serum: 134 mmol/L (02-18 @ 09:58)  Sodium, Serum: 134 mmol/L (02-18 @ 08:12)  Sodium, Serum: 136 mmol/L (02-17 @ 11:32)      8.00 mg/dL 02-21 @ 09:04  5.90 mg/dL 02-19 @ 09:33  7.80 mg/dL 02-18 @ 09:58  7.90 mg/dL 02-18 @ 08:12  8.40 mg/dL 02-17 @ 11:32      Hemoglobin:  Hemoglobin: 9.3 g/dL (02-21 @ 09:04)  Hemoglobin: 10.0 g/dL (02-19 @ 09:33)  Hemoglobin: 9.6 g/dL (02-18 @ 09:58)  Hemoglobin: 9.7 g/dL (02-18 @ 08:12)  Hemoglobin: 10.0 g/dL (02-17 @ 11:32)      Platelets: Platelet Count - Automated: 454 K/uL (02-21 @ 09:04)  Platelet Count - Automated: 487 K/uL (02-19 @ 09:33)  Platelet Count - Automated: 463 K/uL (02-18 @ 09:58)  Platelet Count - Automated: 470 K/uL (02-18 @ 08:12)  Platelet Count - Automated: 479 K/uL (02-17 @ 11:32)              RADIOLOGY & ADDITIONAL STUDIES:      MICROBIOLOGY:  RECENT CULTURES:  02-18 .Blood Blood XXXX XXXX   No growth to date.

## 2022-02-21 NOTE — PROGRESS NOTE ADULT - SUBJECTIVE AND OBJECTIVE BOX
Patient is a 66y Male with a known history of :  DM (diabetes mellitus) [E11.9]    HTN (hypertension) [I10]    ESRD on dialysis [N18.6]    Prophylactic measure [Z29.9]    Leucocytosis [D72.829]      HPI:  Patient  is a 67yo male with pmhx of DM, HTN, ANEMIA, CKD STAGE V ON DIALYSIS TWICE WEEKLY ( last session on tuesday in [facility]) , S/P PNA/SEPSIS 12/21 IN [facility], METABOLIC ACIDOSIS SINCE RESOLVED presents to ED as per Dr Yodre referral  for dialysis , pt was recently diagnosed with CKD in 12/21 while living in [facility]. pt at this time had a Cr of 9 and was placed on dialysis twice weekly (last time 2/15 [facility] [facility] [facility] . pt had repeat labs prior ot leaving [facility] showing cr of 7.8. pt currently reports bilateral  le pain with leg swelling and perkins . pt recently took a flight from [facility] which took aprox ~30 hours. pt without other complaints. As per family  prior to moving back pt was taken off metformin for dm due to renal dysfunction. Patient makes urine.  pt denies fever, cp, sob, n/v/d, abd pain. pt is not currently covid vaccinated but would like to receive the vaccine when stable .Patient developed HTN with  in ER and cardiology eval was requested .Norvasc started by  and HD session ordered , IR procedure ordered and Rt IJ  non tunneled dialysis catheter placed, tip in svc .Admitted to med floor for further management . (17 Feb 2022 17:25)      REVIEW OF SYSTEMS:    CONSTITUTIONAL: No fever, weight loss, or fatigue  EYES: No eye pain, visual disturbances, or discharge  ENMT:  No difficulty hearing, tinnitus, vertigo; No sinus or throat pain  NECK: No pain or stiffness  BREASTS: No pain, masses, or nipple discharge  RESPIRATORY: No cough, wheezing, chills or hemoptysis; No shortness of breath  CARDIOVASCULAR: No chest pain, palpitations, dizziness, or leg swelling  GASTROINTESTINAL: No abdominal or epigastric pain. No nausea, vomiting, or hematemesis; No diarrhea or constipation. No melena or hematochezia.  GENITOURINARY: No dysuria, frequency, hematuria, or incontinence  NEUROLOGICAL: No headaches, memory loss, loss of strength, numbness, or tremors  SKIN: No itching, burning, rashes, or lesions   LYMPH NODES: No enlarged glands  ENDOCRINE: No heat or cold intolerance; No hair loss  MUSCULOSKELETAL: No joint pain or swelling; No muscle, back, or extremity pain  PSYCHIATRIC: No depression, anxiety, mood swings, or difficulty sleeping  HEME/LYMPH: No easy bruising, or bleeding gums  ALLERGY AND IMMUNOLOGIC: No hives or eczema    MEDICATIONS  (STANDING):  amLODIPine   Tablet 10 milliGRAM(s) Oral daily  calcitriol   Capsule 0.25 MICROGram(s) Oral daily  calcium acetate 667 milliGRAM(s) Oral three times a day with meals  dextrose 40% Gel 15 Gram(s) Oral once  dextrose 5%. 1000 milliLiter(s) (50 mL/Hr) IV Continuous <Continuous>  dextrose 5%. 1000 milliLiter(s) (100 mL/Hr) IV Continuous <Continuous>  dextrose 50% Injectable 25 Gram(s) IV Push once  dextrose 50% Injectable 12.5 Gram(s) IV Push once  dextrose 50% Injectable 25 Gram(s) IV Push once  ferrous    sulfate 325 milliGRAM(s) Oral daily  gabapentin 100 milliGRAM(s) Oral two times a day  glucagon  Injectable 1 milliGRAM(s) IntraMuscular once  heparin   Injectable 5000 Unit(s) SubCutaneous every 12 hours  insulin lispro (ADMELOG) corrective regimen sliding scale   SubCutaneous three times a day before meals  insulin lispro (ADMELOG) corrective regimen sliding scale   SubCutaneous at bedtime  losartan 100 milliGRAM(s) Oral daily  pantoprazole    Tablet 40 milliGRAM(s) Oral before breakfast    MEDICATIONS  (PRN):  acetaminophen     Tablet .. 650 milliGRAM(s) Oral every 6 hours PRN Temp greater or equal to 38C (100.4F), Mild Pain (1 - 3)  aluminum hydroxide/magnesium hydroxide/simethicone Suspension 30 milliLiter(s) Oral every 4 hours PRN Dyspepsia  hydrALAZINE 10 milliGRAM(s) Oral three times a day PRN Systolic blood pressure > 170  melatonin 3 milliGRAM(s) Oral at bedtime PRN Insomnia  ondansetron Injectable 4 milliGRAM(s) IV Push every 8 hours PRN Nausea and/or Vomiting  polyethylene glycol 3350 17 Gram(s) Oral two times a day PRN Constipation  traMADol 50 milliGRAM(s) Oral three times a day PRN Moderate Pain (4 - 6)      ALLERGIES: No Known Allergies      FAMILY HISTORY:      PHYSICAL EXAMINATION:  -----------------------------  T(C): 36.6 (02-21-22 @ 05:00), Max: 36.7 (02-20-22 @ 13:53)  HR: 73 (02-21-22 @ 05:00) (73 - 77)  BP: 159/68 (02-21-22 @ 05:00) (120/71 - 159/68)  RR: 17 (02-21-22 @ 05:00) (17 - 18)  SpO2: 95% (02-21-22 @ 05:00) (95% - 98%)  Wt(kg): --        VITALS  T(C): 36.6 (02-21-22 @ 05:00), Max: 36.7 (02-20-22 @ 13:53)  HR: 73 (02-21-22 @ 05:00) (73 - 77)  BP: 159/68 (02-21-22 @ 05:00) (120/71 - 159/68)  RR: 17 (02-21-22 @ 05:00) (17 - 18)  SpO2: 95% (02-21-22 @ 05:00) (95% - 98%)    Constitutional: well developed, normal appearance, well groomed, well nourished, no deformities and no acute distress.   Eyes: the conjunctiva exhibited no abnormalities and the eyelids demonstrated no xanthelasmas.   HEENT: normal oral mucosa, no oral pallor and no oral cyanosis.   Neck: normal jugular venous A waves present, normal jugular venous V waves present and no jugular venous peraza A waves.   Pulmonary: no respiratory distress, normal respiratory rhythm and effort, no accessory muscle use and lungs were clear to auscultation bilaterally.   Cardiovascular: heart rate and rhythm were normal, normal S1 and S2 and no murmur, gallop, rub, heave or thrill are present.   Abdomen: soft, non-tender, no hepato-splenomegaly and no abdominal mass palpated.   Musculoskeletal: the gait could not be assessed..   Extremities: no clubbing of the fingernails, no localized cyanosis, no petechial hemorrhages and no ischemic changes.   Skin: normal skin color and pigmentation, no rash, no venous stasis, no skin lesions, no skin ulcer and no xanthoma was observed.   Psychiatric: oriented to person, place, and time, the affect was normal, the mood was normal and not feeling anxious.     LABS:   --------  02-19    134<L>  |  98  |  41<H>  ----------------------------<  187<H>  4.4   |  28  |  5.90<H>    Ca    8.6      19 Feb 2022 09:33                           10.0   12.51 )-----------( 487      ( 19 Feb 2022 09:33 )             31.3                 RADIOLOGY:  -----------------    ECG:     ECHO:

## 2022-02-22 ENCOUNTER — TRANSCRIPTION ENCOUNTER (OUTPATIENT)
Age: 67
End: 2022-02-22

## 2022-02-22 VITALS
HEART RATE: 80 BPM | OXYGEN SATURATION: 95 % | SYSTOLIC BLOOD PRESSURE: 161 MMHG | TEMPERATURE: 98 F | RESPIRATION RATE: 16 BRPM | DIASTOLIC BLOOD PRESSURE: 76 MMHG

## 2022-02-22 DIAGNOSIS — E11.9 TYPE 2 DIABETES MELLITUS WITHOUT COMPLICATIONS: ICD-10-CM

## 2022-02-22 PROCEDURE — 93970 EXTREMITY STUDY: CPT

## 2022-02-22 PROCEDURE — 77001 FLUOROGUIDE FOR VEIN DEVICE: CPT | Mod: 26

## 2022-02-22 PROCEDURE — 84443 ASSAY THYROID STIM HORMONE: CPT

## 2022-02-22 PROCEDURE — 85025 COMPLETE CBC W/AUTO DIFF WBC: CPT

## 2022-02-22 PROCEDURE — 71046 X-RAY EXAM CHEST 2 VIEWS: CPT

## 2022-02-22 PROCEDURE — 93306 TTE W/DOPPLER COMPLETE: CPT

## 2022-02-22 PROCEDURE — 83880 ASSAY OF NATRIURETIC PEPTIDE: CPT

## 2022-02-22 PROCEDURE — 36558 INSERT TUNNELED CV CATH: CPT

## 2022-02-22 PROCEDURE — 84145 PROCALCITONIN (PCT): CPT

## 2022-02-22 PROCEDURE — 36415 COLL VENOUS BLD VENIPUNCTURE: CPT

## 2022-02-22 PROCEDURE — 97161 PT EVAL LOW COMPLEX 20 MIN: CPT

## 2022-02-22 PROCEDURE — 36556 INSERT NON-TUNNEL CV CATH: CPT

## 2022-02-22 PROCEDURE — 99261: CPT

## 2022-02-22 PROCEDURE — C1752: CPT

## 2022-02-22 PROCEDURE — 76937 US GUIDE VASCULAR ACCESS: CPT

## 2022-02-22 PROCEDURE — 80053 COMPREHEN METABOLIC PANEL: CPT

## 2022-02-22 PROCEDURE — 86850 RBC ANTIBODY SCREEN: CPT

## 2022-02-22 PROCEDURE — 86706 HEP B SURFACE ANTIBODY: CPT

## 2022-02-22 PROCEDURE — 77001 FLUOROGUIDE FOR VEIN DEVICE: CPT

## 2022-02-22 PROCEDURE — 83735 ASSAY OF MAGNESIUM: CPT

## 2022-02-22 PROCEDURE — C1769: CPT

## 2022-02-22 PROCEDURE — 71250 CT THORAX DX C-: CPT

## 2022-02-22 PROCEDURE — 82310 ASSAY OF CALCIUM: CPT

## 2022-02-22 PROCEDURE — 86704 HEP B CORE ANTIBODY TOTAL: CPT

## 2022-02-22 PROCEDURE — 87340 HEPATITIS B SURFACE AG IA: CPT

## 2022-02-22 PROCEDURE — 76770 US EXAM ABDO BACK WALL COMP: CPT

## 2022-02-22 PROCEDURE — 83550 IRON BINDING TEST: CPT

## 2022-02-22 PROCEDURE — 86900 BLOOD TYPING SEROLOGIC ABO: CPT

## 2022-02-22 PROCEDURE — 83970 ASSAY OF PARATHORMONE: CPT

## 2022-02-22 PROCEDURE — 85730 THROMBOPLASTIN TIME PARTIAL: CPT

## 2022-02-22 PROCEDURE — 86901 BLOOD TYPING SEROLOGIC RH(D): CPT

## 2022-02-22 PROCEDURE — 99285 EMERGENCY DEPT VISIT HI MDM: CPT | Mod: 25

## 2022-02-22 PROCEDURE — 86803 HEPATITIS C AB TEST: CPT

## 2022-02-22 PROCEDURE — 85027 COMPLETE CBC AUTOMATED: CPT

## 2022-02-22 PROCEDURE — 83540 ASSAY OF IRON: CPT

## 2022-02-22 PROCEDURE — 99221 1ST HOSP IP/OBS SF/LOW 40: CPT

## 2022-02-22 PROCEDURE — 80048 BASIC METABOLIC PNL TOTAL CA: CPT

## 2022-02-22 PROCEDURE — 83036 HEMOGLOBIN GLYCOSYLATED A1C: CPT

## 2022-02-22 PROCEDURE — 85610 PROTHROMBIN TIME: CPT

## 2022-02-22 PROCEDURE — 82962 GLUCOSE BLOOD TEST: CPT

## 2022-02-22 PROCEDURE — 82728 ASSAY OF FERRITIN: CPT

## 2022-02-22 PROCEDURE — 93005 ELECTROCARDIOGRAM TRACING: CPT

## 2022-02-22 PROCEDURE — 84100 ASSAY OF PHOSPHORUS: CPT

## 2022-02-22 PROCEDURE — 74176 CT ABD & PELVIS W/O CONTRAST: CPT

## 2022-02-22 PROCEDURE — 81001 URINALYSIS AUTO W/SCOPE: CPT

## 2022-02-22 PROCEDURE — C1750: CPT

## 2022-02-22 PROCEDURE — 87040 BLOOD CULTURE FOR BACTERIA: CPT

## 2022-02-22 PROCEDURE — 82040 ASSAY OF SERUM ALBUMIN: CPT

## 2022-02-22 PROCEDURE — 87635 SARS-COV-2 COVID-19 AMP PRB: CPT

## 2022-02-22 RX ORDER — AMLODIPINE BESYLATE 2.5 MG/1
1 TABLET ORAL
Qty: 0 | Refills: 0 | DISCHARGE
Start: 2022-02-22

## 2022-02-22 RX ORDER — AMLODIPINE BESYLATE 2.5 MG/1
1 TABLET ORAL
Qty: 15 | Refills: 0
Start: 2022-02-22 | End: 2022-03-08

## 2022-02-22 RX ORDER — HYDRALAZINE HCL 50 MG
1 TABLET ORAL
Qty: 0 | Refills: 0 | DISCHARGE
Start: 2022-02-22

## 2022-02-22 RX ORDER — PANTOPRAZOLE SODIUM 20 MG/1
1 TABLET, DELAYED RELEASE ORAL
Qty: 15 | Refills: 0
Start: 2022-02-22 | End: 2022-03-08

## 2022-02-22 RX ORDER — POLYETHYLENE GLYCOL 3350 17 G/17G
17 POWDER, FOR SOLUTION ORAL
Qty: 510 | Refills: 0
Start: 2022-02-22 | End: 2022-03-08

## 2022-02-22 RX ORDER — REPAGLINIDE 1 MG/1
0.5 TABLET ORAL
Refills: 0 | Status: DISCONTINUED | OUTPATIENT
Start: 2022-02-22 | End: 2022-02-22

## 2022-02-22 RX ORDER — GABAPENTIN 400 MG/1
1 CAPSULE ORAL
Qty: 30 | Refills: 0
Start: 2022-02-22 | End: 2022-03-08

## 2022-02-22 RX ORDER — ACETAMINOPHEN 500 MG
2 TABLET ORAL
Qty: 0 | Refills: 0 | DISCHARGE
Start: 2022-02-22

## 2022-02-22 RX ORDER — CALCITRIOL 0.5 UG/1
1 CAPSULE ORAL
Qty: 0 | Refills: 0 | DISCHARGE
Start: 2022-02-22

## 2022-02-22 RX ORDER — PANTOPRAZOLE SODIUM 20 MG/1
1 TABLET, DELAYED RELEASE ORAL
Qty: 0 | Refills: 0 | DISCHARGE
Start: 2022-02-22

## 2022-02-22 RX ORDER — LOSARTAN POTASSIUM 100 MG/1
1 TABLET, FILM COATED ORAL
Qty: 15 | Refills: 0
Start: 2022-02-22 | End: 2022-03-08

## 2022-02-22 RX ORDER — CALCITRIOL 0.5 UG/1
1 CAPSULE ORAL
Qty: 15 | Refills: 0
Start: 2022-02-22 | End: 2022-03-08

## 2022-02-22 RX ORDER — GABAPENTIN 400 MG/1
1 CAPSULE ORAL
Qty: 0 | Refills: 0 | DISCHARGE
Start: 2022-02-22

## 2022-02-22 RX ORDER — POLYETHYLENE GLYCOL 3350 17 G/17G
17 POWDER, FOR SOLUTION ORAL
Qty: 0 | Refills: 0 | DISCHARGE
Start: 2022-02-22

## 2022-02-22 RX ORDER — CALCIUM ACETATE 667 MG
1 TABLET ORAL
Qty: 45 | Refills: 0
Start: 2022-02-22 | End: 2022-03-08

## 2022-02-22 RX ORDER — LOSARTAN POTASSIUM 100 MG/1
1 TABLET, FILM COATED ORAL
Qty: 0 | Refills: 0 | DISCHARGE
Start: 2022-02-22

## 2022-02-22 RX ORDER — REPAGLINIDE 1 MG/1
1 TABLET ORAL
Qty: 30 | Refills: 0
Start: 2022-02-22 | End: 2022-03-08

## 2022-02-22 RX ORDER — FERROUS SULFATE 325(65) MG
1 TABLET ORAL
Qty: 0 | Refills: 0 | DISCHARGE
Start: 2022-02-22

## 2022-02-22 RX ORDER — FERROUS SULFATE 325(65) MG
1 TABLET ORAL
Qty: 15 | Refills: 0
Start: 2022-02-22 | End: 2022-03-08

## 2022-02-22 RX ADMIN — LOSARTAN POTASSIUM 100 MILLIGRAM(S): 100 TABLET, FILM COATED ORAL at 05:14

## 2022-02-22 RX ADMIN — REPAGLINIDE 0.5 MILLIGRAM(S): 1 TABLET ORAL at 17:27

## 2022-02-22 RX ADMIN — Medication 667 MILLIGRAM(S): at 12:08

## 2022-02-22 RX ADMIN — CALCITRIOL 0.25 MICROGRAM(S): 0.5 CAPSULE ORAL at 12:08

## 2022-02-22 RX ADMIN — Medication 667 MILLIGRAM(S): at 17:26

## 2022-02-22 RX ADMIN — GABAPENTIN 100 MILLIGRAM(S): 400 CAPSULE ORAL at 05:14

## 2022-02-22 RX ADMIN — PANTOPRAZOLE SODIUM 40 MILLIGRAM(S): 20 TABLET, DELAYED RELEASE ORAL at 05:14

## 2022-02-22 RX ADMIN — HEPARIN SODIUM 5000 UNIT(S): 5000 INJECTION INTRAVENOUS; SUBCUTANEOUS at 05:14

## 2022-02-22 RX ADMIN — Medication 325 MILLIGRAM(S): at 12:08

## 2022-02-22 NOTE — DISCHARGE NOTE PROVIDER - NSDCCPCAREPLAN_GEN_ALL_CORE_FT
PRINCIPAL DISCHARGE DIAGNOSIS  Diagnosis: ESRD on dialysis  Assessment and Plan of Treatment:       SECONDARY DISCHARGE DIAGNOSES  Diagnosis: DM (diabetes mellitus)  Assessment and Plan of Treatment:     Diagnosis: HTN (hypertension)  Assessment and Plan of Treatment:

## 2022-02-22 NOTE — PROGRESS NOTE ADULT - PROBLEM SELECTOR PLAN 4
Gastrointestinal stress ulcer prophylaxis and DVT prophylaxis administered
Accuchecks monitoring and insulin corrective regimen  sliding scale coverage with short acting inslulin, add longacting insulin as needed ,no concentrated sweets diet, serial labs ,HbA1C,education

## 2022-02-22 NOTE — DISCHARGE NOTE PROVIDER - HOSPITAL COURSE
Patient  is a 65yo male with pmhx of DM, HTN, ANEMIA, CKD STAGE V ON DIALYSIS TWICE WEEKLY ( last session on tuesday in [facility]) , S/P PNA/SEPSIS 12/21 IN [place], METABOLIC ACIDOSIS SINCE RESOLVED presents to ED as per Dr Yoder referral  for dialysis , pt was recently diagnosed with CKD in 12/21 while living in [facility]. pt at this time had a Cr of 9 and was placed on dialysis twice weekly (last time 2/15 [facility] [facility] . pt had repeat labs prior ot leaving [facility] showing cr of 7.8. pt currently reports bilateral  le pain with leg swelling and perkins . pt recently took a flight from [facility] which took aprox ~30 hours. pt without other complaints. As per family  prior to moving back pt was taken off metformin for dm due to renal dysfunction. Patient makes urine.  pt denies fever, cp, sob, n/v/d, abd pain. pt is not currently covid vaccinated but would like to receive the vaccine when stable .Patient developed HTN with  in ER and cardiology eval was requested .Norvasc started by  and HD session ordered , IR procedure ordered and Rt IJ  non tunneled dialysis catheter placed, tip in svc .Admitted to med floor for further management .    Problem/Plan - 1:  ·  Problem: ESRD on dialysis.   ·  Plan: IR procedure performed and Rt IJ  non tunneled dialysis catheter placed, tip in svc .HD as per nephrologist orders.    Problem/Plan - 2:  ·  Problem: Leucocytosis.   ·  Plan: Workup is as per ID .Awaiting for perm cath placement by IR ,as per Dr Yoder IR requested ID clearance for procedure due to WBC elevation .D/W Dr. Johnson -patient is cleared for IR procedure.    Problem/Plan - 3:  ·  Problem: HTN (hypertension).   ·  Plan: continue home medications ,BP monitoring  ,cardiology and nephrology are following.    Problem/Plan - 4:  ·  Problem: DM (diabetes mellitus).   ·  Plan: Accuchecks monitoring and insulin corrective regimen  sliding scale coverage with short acting inslulin, add longacting insulin as needed ,no concentrated sweets diet, serial labs ,HbA1C,education.    Problem/Plan - 5:  ·  Problem: Prophylactic measure.   ·  Plan: Gastrointestinal stress ulcer prophylaxis and DVT prophylaxis administered.

## 2022-02-22 NOTE — PROGRESS NOTE ADULT - SUBJECTIVE AND OBJECTIVE BOX
Patient is a 67y Male with a known history of :  DM (diabetes mellitus) [E11.9]    HTN (hypertension) [I10]    ESRD on dialysis [N18.6]    Prophylactic measure [Z29.9]    Leucocytosis [D72.829]      HPI:  Patient  is a 65yo male with pmhx of DM, HTN, ANEMIA, CKD STAGE V ON DIALYSIS TWICE WEEKLY ( last session on tuesday in [facility]) , S/P PNA/SEPSIS 12/21 IN [facility], METABOLIC ACIDOSIS SINCE RESOLVED presents to ED as per Dr Yoder referral  for dialysis , pt was recently diagnosed with CKD in 12/21 while living in [facility]. pt at this time had a Cr of 9 and was placed on dialysis twice weekly (last time 2/15 [facility] [facility] . pt had repeat labs prior ot leaving [facility] showing cr of 7.8. pt currently reports bilateral  le pain with leg swelling and perkins . pt recently took a flight from [facility] which took aprox ~30 hours. pt without other complaints. As per family  prior to moving back pt was taken off metformin for dm due to renal dysfunction. Patient makes urine.  pt denies fever, cp, sob, n/v/d, abd pain. pt is not currently covid vaccinated but would like to receive the vaccine when stable .Patient developed HTN with  in ER and cardiology eval was requested .Norvasc started by  and HD session ordered , IR procedure ordered and Rt IJ  non tunneled dialysis catheter placed, tip in svc .Admitted to med floor for further management . (17 Feb 2022 17:25)      REVIEW OF SYSTEMS:    CONSTITUTIONAL: No fever, weight loss, or fatigue  EYES: No eye pain, visual disturbances, or discharge  ENMT:  No difficulty hearing, tinnitus, vertigo; No sinus or throat pain  NECK: No pain or stiffness  BREASTS: No pain, masses, or nipple discharge  RESPIRATORY: No cough, wheezing, chills or hemoptysis; No shortness of breath  CARDIOVASCULAR: No chest pain, palpitations, dizziness, or leg swelling  GASTROINTESTINAL: No abdominal or epigastric pain. No nausea, vomiting, or hematemesis; No diarrhea or constipation. No melena or hematochezia.  GENITOURINARY: No dysuria, frequency, hematuria, or incontinence  NEUROLOGICAL: No headaches, memory loss, loss of strength, numbness, or tremors  SKIN: No itching, burning, rashes, or lesions   LYMPH NODES: No enlarged glands  ENDOCRINE: No heat or cold intolerance; No hair loss  MUSCULOSKELETAL: No joint pain or swelling; No muscle, back, or extremity pain  PSYCHIATRIC: No depression, anxiety, mood swings, or difficulty sleeping  HEME/LYMPH: No easy bruising, or bleeding gums  ALLERGY AND IMMUNOLOGIC: No hives or eczema    MEDICATIONS  (STANDING):  amLODIPine   Tablet 10 milliGRAM(s) Oral daily  calcitriol   Capsule 0.25 MICROGram(s) Oral daily  calcium acetate 667 milliGRAM(s) Oral three times a day with meals  dextrose 40% Gel 15 Gram(s) Oral once  dextrose 5%. 1000 milliLiter(s) (50 mL/Hr) IV Continuous <Continuous>  dextrose 5%. 1000 milliLiter(s) (100 mL/Hr) IV Continuous <Continuous>  dextrose 50% Injectable 25 Gram(s) IV Push once  dextrose 50% Injectable 12.5 Gram(s) IV Push once  dextrose 50% Injectable 25 Gram(s) IV Push once  ferrous    sulfate 325 milliGRAM(s) Oral daily  gabapentin 100 milliGRAM(s) Oral two times a day  glucagon  Injectable 1 milliGRAM(s) IntraMuscular once  heparin   Injectable 5000 Unit(s) SubCutaneous every 12 hours  insulin lispro (ADMELOG) corrective regimen sliding scale   SubCutaneous three times a day before meals  insulin lispro (ADMELOG) corrective regimen sliding scale   SubCutaneous at bedtime  losartan 100 milliGRAM(s) Oral daily  pantoprazole    Tablet 40 milliGRAM(s) Oral before breakfast    MEDICATIONS  (PRN):  acetaminophen     Tablet .. 650 milliGRAM(s) Oral every 6 hours PRN Temp greater or equal to 38C (100.4F), Mild Pain (1 - 3)  aluminum hydroxide/magnesium hydroxide/simethicone Suspension 30 milliLiter(s) Oral every 4 hours PRN Dyspepsia  hydrALAZINE 10 milliGRAM(s) Oral three times a day PRN Systolic blood pressure > 170  melatonin 3 milliGRAM(s) Oral at bedtime PRN Insomnia  ondansetron Injectable 4 milliGRAM(s) IV Push every 8 hours PRN Nausea and/or Vomiting  polyethylene glycol 3350 17 Gram(s) Oral two times a day PRN Constipation  traMADol 50 milliGRAM(s) Oral three times a day PRN Moderate Pain (4 - 6)      ALLERGIES: No Known Allergies      FAMILY HISTORY:      PHYSICAL EXAMINATION:  -----------------------------  T(C): 36.7 (02-22-22 @ 09:30), Max: 37.6 (02-21-22 @ 21:58)  HR: 74 (02-22-22 @ 09:30) (74 - 84)  BP: 135/57 (02-22-22 @ 09:30) (119/67 - 150/68)  RR: 15 (02-22-22 @ 09:30) (15 - 18)  SpO2: 100% (02-22-22 @ 09:30) (96% - 100%)  Wt(kg): --    02-21 @ 07:01  -  02-22 @ 07:00  --------------------------------------------------------  IN:  Total IN: 0 mL    OUT:    Other (mL): 1500 mL  Total OUT: 1500 mL    Total NET: -1500 mL            VITALS  T(C): 36.7 (02-22-22 @ 09:30), Max: 37.6 (02-21-22 @ 21:58)  HR: 74 (02-22-22 @ 09:30) (74 - 84)  BP: 135/57 (02-22-22 @ 09:30) (119/67 - 150/68)  RR: 15 (02-22-22 @ 09:30) (15 - 18)  SpO2: 100% (02-22-22 @ 09:30) (96% - 100%)    Constitutional: well developed, normal appearance, well groomed, well nourished, no deformities and no acute distress.   Eyes: the conjunctiva exhibited no abnormalities and the eyelids demonstrated no xanthelasmas.   HEENT: normal oral mucosa, no oral pallor and no oral cyanosis.   Neck: normal jugular venous A waves present, normal jugular venous V waves present and no jugular venous peraza A waves.   Pulmonary: no respiratory distress, normal respiratory rhythm and effort, no accessory muscle use and lungs were clear to auscultation bilaterally.   Cardiovascular: heart rate and rhythm were normal, normal S1 and S2 and no murmur, gallop, rub, heave or thrill are present.   Abdomen: soft, non-tender, no hepato-splenomegaly and no abdominal mass palpated.   Musculoskeletal: the gait could not be assessed..   Extremities: no clubbing of the fingernails, no localized cyanosis, no petechial hemorrhages and no ischemic changes.   Skin: normal skin color and pigmentation, no rash, no venous stasis, no skin lesions, no skin ulcer and no xanthoma was observed.   Psychiatric: oriented to person, place, and time, the affect was normal, the mood was normal and not feeling anxious.     LABS:   --------  02-21    138  |  102  |  66<H>  ----------------------------<  105<H>  5.2   |  26  |  8.00<H>    Ca    8.8      21 Feb 2022 09:04                           9.3    12.28 )-----------( 454      ( 21 Feb 2022 09:04 )             29.3                 RADIOLOGY:  -----------------    ECG:     ECHO:

## 2022-02-22 NOTE — CONSULT NOTE ADULT - PROBLEM SELECTOR RECOMMENDATION 9
Type 2 A1c 6.8% adm: CKD  FU appt: JOANNA  DSC recommendations: return to home regimen and glucose monitoring  diabetes education provided  Diabetes support info and cell # [phone] given   Goal 100-180 mg/dL; 140-180 mg/dL in critical care areas

## 2022-02-22 NOTE — PROGRESS NOTE ADULT - NUTRITIONAL ASSESSMENT
MEDICATIONS  (STANDING):  amLODIPine   Tablet 10 milliGRAM(s) Oral daily  dextrose 40% Gel 15 Gram(s) Oral once  dextrose 5%. 1000 milliLiter(s) (50 mL/Hr) IV Continuous <Continuous>  dextrose 5%. 1000 milliLiter(s) (100 mL/Hr) IV Continuous <Continuous>  dextrose 50% Injectable 25 Gram(s) IV Push once  dextrose 50% Injectable 12.5 Gram(s) IV Push once  dextrose 50% Injectable 25 Gram(s) IV Push once  gabapentin 100 milliGRAM(s) Oral two times a day  glucagon  Injectable 1 milliGRAM(s) IntraMuscular once  heparin   Injectable 5000 Unit(s) SubCutaneous every 12 hours  insulin lispro (ADMELOG) corrective regimen sliding scale   SubCutaneous three times a day before meals  insulin lispro (ADMELOG) corrective regimen sliding scale   SubCutaneous at bedtime  losartan 100 milliGRAM(s) Oral daily  pantoprazole    Tablet 40 milliGRAM(s) Oral before breakfast

## 2022-02-22 NOTE — PROGRESS NOTE ADULT - PROBLEM SELECTOR PLAN 2
continue home medications ,BP monitoring  ,cardiology and nephrology are following
Workup is as per ID .Awaiting for perm cath placement by IR ,as per Dr Yoder IR requested ID clearance for procedure due to WBC elevation .D/W Dr. Johnson -patient is cleared for IR procedure

## 2022-02-22 NOTE — PROGRESS NOTE ADULT - SUBJECTIVE AND OBJECTIVE BOX
PROGRESS NOTE  Patient is a 67y old  Male who presents with a chief complaint of sent to ED for HD (22 Feb 2022 13:57)  Chart and available morning labs /imaging are reviewed electronically , urgent issues addressed . More information  is being added upon completion of rounds , when more information is collected and management discussed with consultants , medical staff and social service/case management on the floor     OVERNIGHT  No new issues reported by medical staff . All above noted Patient is resting in a bed comfortably  .No distress noted     HPI:  Patient  is a 65yo male with pmhx of DM, HTN, ANEMIA, CKD STAGE V ON DIALYSIS TWICE WEEKLY ( last session on tuesday in [facility]) , S/P PNA/SEPSIS 12/21 IN [facility], METABOLIC ACIDOSIS SINCE RESOLVED presents to ED as per Dr Yoder referral  for dialysis , pt was recently diagnosed with CKD in 12/21 while living in [facility]. pt at this time had a Cr of 9 and was placed on dialysis twice weekly (last time 2/15 [facility] . pt had repeat labs prior ot leaving [facility] showing cr of 7.8. pt currently reports bilateral  le pain with leg swelling and perkins . pt recently took a flight from [facility] which took aprox ~30 hours. pt without other complaints. As per family  prior to moving back pt was taken off metformin for dm due to renal dysfunction. Patient makes urine.  pt denies fever, cp, sob, n/v/d, abd pain. pt is not currently covid vaccinated but would like to receive the vaccine when stable .Patient developed HTN with  in ER and cardiology eval was requested .Norvasc started by  and HD session ordered , IR procedure ordered and Rt IJ  non tunneled dialysis catheter placed, tip in svc .Admitted to med floor for further management . (17 Feb 2022 17:25)    PAST MEDICAL & SURGICAL HISTORY:  DM (diabetes mellitus)    HTN (hypertension)    Chronic kidney disease, unspecified CKD stage    ESRD on dialysis    Pneumonia    Sepsis    Anemia    No significant past surgical history        MEDICATIONS  (STANDING):  amLODIPine   Tablet 10 milliGRAM(s) Oral daily  calcitriol   Capsule 0.25 MICROGram(s) Oral daily  calcium acetate 667 milliGRAM(s) Oral three times a day with meals  dextrose 40% Gel 15 Gram(s) Oral once  dextrose 5%. 1000 milliLiter(s) (50 mL/Hr) IV Continuous <Continuous>  dextrose 5%. 1000 milliLiter(s) (100 mL/Hr) IV Continuous <Continuous>  dextrose 50% Injectable 25 Gram(s) IV Push once  dextrose 50% Injectable 12.5 Gram(s) IV Push once  dextrose 50% Injectable 25 Gram(s) IV Push once  ferrous    sulfate 325 milliGRAM(s) Oral daily  gabapentin 100 milliGRAM(s) Oral two times a day  glucagon  Injectable 1 milliGRAM(s) IntraMuscular once  heparin   Injectable 5000 Unit(s) SubCutaneous every 12 hours  insulin lispro (ADMELOG) corrective regimen sliding scale   SubCutaneous three times a day before meals  insulin lispro (ADMELOG) corrective regimen sliding scale   SubCutaneous at bedtime  losartan 100 milliGRAM(s) Oral daily  pantoprazole    Tablet 40 milliGRAM(s) Oral before breakfast  repaglinide 0.5 milliGRAM(s) Oral two times a day before meals    MEDICATIONS  (PRN):  acetaminophen     Tablet .. 650 milliGRAM(s) Oral every 6 hours PRN Temp greater or equal to 38C (100.4F), Mild Pain (1 - 3)  aluminum hydroxide/magnesium hydroxide/simethicone Suspension 30 milliLiter(s) Oral every 4 hours PRN Dyspepsia  hydrALAZINE 10 milliGRAM(s) Oral three times a day PRN Systolic blood pressure > 170  melatonin 3 milliGRAM(s) Oral at bedtime PRN Insomnia  ondansetron Injectable 4 milliGRAM(s) IV Push every 8 hours PRN Nausea and/or Vomiting  polyethylene glycol 3350 17 Gram(s) Oral two times a day PRN Constipation  traMADol 50 milliGRAM(s) Oral three times a day PRN Moderate Pain (4 - 6)      OBJECTIVE    T(C): 36.7 (02-22-22 @ 12:18), Max: 37.6 (02-21-22 @ 21:58)  HR: 80 (02-22-22 @ 12:18) (74 - 80)  BP: 161/76 (02-22-22 @ 12:18) (119/67 - 161/76)  RR: 16 (02-22-22 @ 12:18) (15 - 18)  SpO2: 95% (02-22-22 @ 12:18) (95% - 100%)  Wt(kg): --  I&O's Summary    21 Feb 2022 07:01  -  22 Feb 2022 07:00  --------------------------------------------------------  IN: 0 mL / OUT: 1500 mL / NET: -1500 mL          REVIEW OF SYSTEMS:  CONSTITUTIONAL: No fever, weight loss, or fatigue  EYES: No eye pain, visual disturbances, or discharge  ENMT:   No sinus or throat pain  NECK: No pain or stiffness  RESPIRATORY: No cough, wheezing, chills or hemoptysis; No shortness of breath  CARDIOVASCULAR: No chest pain, palpitations, dizziness, or leg swelling  GASTROINTESTINAL: No abdominal pain. No nausea, vomiting; No diarrhea or constipation. No melena or hematochezia.  GENITOURINARY: No dysuria, frequency, hematuria, or incontinence  NEUROLOGICAL: No headaches, memory loss, loss of strength, numbness, or tremors  SKIN: No itching, burning, rashes, or lesions   MUSCULOSKELETAL: No joint pain or swelling; No muscle, back, or extremity pain    PHYSICAL EXAM:  Appearance: NAD. VS past 24 hrs -as above   HEENT:   Moist oral mucosa. Conjunctiva clear b/l.   Neck : supple  Respiratory: Lungs CTAB.  Gastrointestinal:  Soft, nontender. No rebound. No rigidity. BS present	  Cardiovascular: RRR ,S1S2 present  Neurologic: Non-focal. Moving all extremities.  Extremities: No edema. No erythema. No calf tenderness.  Skin: No rashes, No ecchymoses, No cyanosis.	  wounds ,skin lesions-See skin assesment flow sheet   LABS:                        9.3    12.28 )-----------( 454      ( 21 Feb 2022 09:04 )             29.3     02-21    138  |  102  |  66<H>  ----------------------------<  105<H>  5.2   |  26  |  8.00<H>    Ca    8.8      21 Feb 2022 09:04      CAPILLARY BLOOD GLUCOSE      POCT Blood Glucose.: 95 mg/dL (22 Feb 2022 11:41)  POCT Blood Glucose.: 107 mg/dL (22 Feb 2022 06:15)  POCT Blood Glucose.: 211 mg/dL (21 Feb 2022 21:49)  POCT Blood Glucose.: 90 mg/dL (21 Feb 2022 16:17)          Culture - Blood (collected 18 Feb 2022 12:17)  Source: .Blood Blood  Preliminary Report (19 Feb 2022 13:01):    No growth to date.    Culture - Blood (collected 18 Feb 2022 12:17)  Source: .Blood Blood  Preliminary Report (19 Feb 2022 13:01):    No growth to date.      RADIOLOGY & ADDITIONAL TESTS:   reviewed elctronically  ASSESSMENT/PLAN: 	  s/p perm cath placement ,spoke to Dr Yoder -ok to d/c home today .Dr Yoder advised to Add Prandin for DM bid  ,no insulin ,he will follow in the office Spoke to [employer] chair is jordy   Patient was seen and examined on a day of discharge . Plan of care , discharge medications and recommendations discussed with consultants and clearance for discharge obtained .Social service , case management  and medical staff are aware of plan. Family is notified. Discharge summary  is  prepared electronically-see separate document prepared by me .75minutes spent on this visit, 50% visit time spent in care co-ordination with other attendings and counselling patient  I have discussed care plan with patient and HCP,expressed understanding of problems treatment and their effect and side effects, alternatives in detail,I have asked if they have any questions and concerns and appropriately addressed them to best of my ability

## 2022-02-22 NOTE — DISCHARGE NOTE PROVIDER - NSDCCPTREATMENT_GEN_ALL_CORE_FT
PRINCIPAL PROCEDURE  Procedure: Insertion of dialysis catheter with imaging guidance  Findings and Treatment:

## 2022-02-22 NOTE — PROGRESS NOTE ADULT - PROVIDER SPECIALTY LIST ADULT
Cardiology
Cardiology
Infectious Disease
Nephrology
Infectious Disease
Nephrology
Pulmonology
Hospitalist
Internal Medicine
Nephrology
Pulmonology
Nephrology
Nephrology
Cardiology
Cardiology
Hospitalist

## 2022-02-22 NOTE — DISCHARGE NOTE NURSING/CASE MANAGEMENT/SOCIAL WORK - PATIENT PORTAL LINK FT
You can access the FollowMyHealth Patient Portal offered by [facility] by registering at the following website: [email or web address]. By joining [employer]’s FollowMyHealth portal, you will also be able to view your health information using other applications (apps) compatible with our system.

## 2022-02-22 NOTE — DISCHARGE NOTE NURSING/CASE MANAGEMENT/SOCIAL WORK - NSDCPEFALRISK_GEN_ALL_CORE
For information on Fall & Injury Prevention, visit: [email or web address] OR  [email or web address] OR  https://www.cdc.gov/steadi/patient.html

## 2022-02-22 NOTE — DISCHARGE NOTE PROVIDER - PROVIDER TOKENS
PROVIDER:[TOKEN:[[id number]],FOLLOWUP:[1-3 days]],PROVIDER:[TOKEN:[[id number]],FOLLOWUP:[2 weeks]]

## 2022-02-22 NOTE — PROCEDURE NOTE - PROCEDURE FINDINGS AND DETAILS
indwelling right ij non tunneled dialysis catheter removed over wire and new right ij tunneled dialysis catheter placed. tip in svc. ok to access.
rt ij non tunneled dialysis catheter placed. tip in svc. ok to access.

## 2022-02-22 NOTE — DISCHARGE NOTE PROVIDER - CARE PROVIDER_API CALL
Pahlavan, Mohsen (MD)  Medicine  [address], [address]  [place] [address]  Phone: [phone]  Fax: [phone]  Follow Up Time: 1-3 days    Cassandra De La Rosa)  Internal Medicine  [address], [address] [place] [address]  Phone: [phone]  Fax: [phone]  Follow Up Time: 2 weeks

## 2022-02-22 NOTE — DISCHARGE NOTE PROVIDER - NSDCMRMEDTOKEN_GEN_ALL_CORE_FT
acetaminophen 325 mg oral tablet: 2 tab(s) orally every 6 hours, As needed, Temp greater or equal to 38C (100.4F), Mild Pain (1 - 3)  amLODIPine 10 mg oral tablet: 1 tab(s) orally once a day  calcitriol 0.25 mcg oral capsule: 1 cap(s) orally once a day  ferrous sulfate 325 mg (65 mg elemental iron) oral tablet: 1 tab(s) orally once a day  gabapentin 100 mg oral capsule: 1 cap(s) orally 2 times a day  hydrALAZINE 10 mg oral tablet: 1 tab(s) orally 3 times a day, As needed, Systolic blood pressure &gt; 170  losartan 100 mg oral tablet: 1 tab(s) orally once a day  Nephro-Patito oral tablet: 1 tab(s) orally once a day  pantoprazole 40 mg oral delayed release tablet: 1 tab(s) orally once a day (before a meal)  polyethylene glycol 3350 oral powder for reconstitution: 17 gram(s) orally 2 times a day, As needed, Constipation   acetaminophen 325 mg oral tablet: 2 tab(s) orally every 6 hours, As needed, Temp greater or equal to 38C (100.4F), Mild Pain (1 - 3) OTC   amLODIPine 10 mg oral tablet: 1 tab(s) orally once a day  calcitriol 0.25 mcg oral capsule: 1 cap(s) orally once a day  ferrous sulfate 325 mg (65 mg elemental iron) oral tablet: 1 tab(s) orally once a day  gabapentin 100 mg oral capsule: 1 cap(s) orally 2 times a day  hydrALAZINE 10 mg oral tablet: 1 tab(s) orally 3 times a day, As needed, Systolic blood pressure &gt; 170  losartan 100 mg oral tablet: 1 tab(s) orally once a day ,hold for sbp below 100  Nephro-Patito oral tablet: 1 tab(s) orally once a day  pantoprazole 40 mg oral delayed release tablet: 1 tab(s) orally once a day (before a meal)  polyethylene glycol 3350 oral powder for reconstitution: 17 gram(s) orally 2 times a day, As needed, Constipation  repaglinide 0.5 mg oral tablet: 1 tab(s) orally 2 times a day (before meals)   acetaminophen 325 mg oral tablet: 2 tab(s) orally every 6 hours, As needed, Temp greater or equal to 38C (100.4F), Mild Pain (1 - 3) OTC   amLODIPine 10 mg oral tablet: 1 tab(s) orally once a day  calcitriol 0.25 mcg oral capsule: 1 cap(s) orally once a day  calcium acetate 667 mg oral tablet: 1 tab(s) orally 3 times a day   ferrous sulfate 325 mg (65 mg elemental iron) oral tablet: 1 tab(s) orally once a day  gabapentin 100 mg oral capsule: 1 cap(s) orally 2 times a day  hydrALAZINE 10 mg oral tablet: 1 tab(s) orally 3 times a day, As needed, Systolic blood pressure &gt; 170  losartan 100 mg oral tablet: 1 tab(s) orally once a day ,hold for sbp below 100  Nephro-Patito oral tablet: 1 tab(s) orally once a day  pantoprazole 40 mg oral delayed release tablet: 1 tab(s) orally once a day (before a meal)  polyethylene glycol 3350 oral powder for reconstitution: 17 gram(s) orally 2 times a day, As needed, Constipation  repaglinide 0.5 mg oral tablet: 1 tab(s) orally 2 times a day (before meals)

## 2022-02-22 NOTE — PROGRESS NOTE ADULT - REASON FOR ADMISSION
sent to ED for HD

## 2022-02-22 NOTE — CONSULT NOTE ADULT - CONSULT REASON
esrd on hd
Evaluation for leukocytosis.
cardiac evaluation
seps
67y A1C with Estimated Average Glucose Result: 6.8 % (02-18-22 @ 11:41)   diabetes mellitus uncontrolled type 2

## 2022-02-22 NOTE — CONSULT NOTE ADULT - SUBJECTIVE AND OBJECTIVE BOX
Patient is a 67y old  Male who presents with a chief complaint of sent to ED for HD (22 Feb 2022 13:57)    video : Daljit #[id number]. Type:2 known complications; CKD. Rx home: NONE. Glucometer checks- -120- 150 mg/dL, denies needs,  diabetes education provided.    HPI:  Patient  is a 65yo male with pmhx of DM, HTN, ANEMIA, CKD STAGE V ON DIALYSIS TWICE WEEKLY ( last session on tuesday in [facility]) , S/P PNA/SEPSIS 12/21 IN [facility], METABOLIC ACIDOSIS SINCE RESOLVED presents to ED as per Dr Yoder referral  for dialysis , pt was recently diagnosed with CKD in 12/21 while living in [facility]. pt at this time had a Cr of 9 and was placed on dialysis twice weekly (last time 2/15 [facility] . pt had repeat labs prior ot leaving [facility] showing cr of 7.8. pt currently reports bilateral  le pain with leg swelling and perkins . pt recently took a flight from [facility] which took aprox ~30 hours. pt without other complaints. As per family  prior to moving back pt was taken off metformin for dm due to renal dysfunction. Patient makes urine.  pt denies fever, cp, sob, n/v/d, abd pain. pt is not currently covid vaccinated but would like to receive the vaccine when stable .Patient developed HTN with  in ER and cardiology eval was requested .Norvasc started by  and HD session ordered , IR procedure ordered and Rt IJ  non tunneled dialysis catheter placed, tip in svc .Admitted to med floor for further management . (17 Feb 2022 17:25)      PAST MEDICAL & SURGICAL HISTORY:  DM (diabetes mellitus)    HTN (hypertension)    Chronic kidney disease, unspecified CKD stage    ESRD on dialysis    Pneumonia    Sepsis    Anemia    No significant past surgical history        REVIEW OF SYSTEMS  General:	as above  Respiratory: NAD, No SOB, no cough  Cardiovascular: No chest pain, no palpitations	  Endocrine: no polyuria, no polydipsia, or S/S of hypoglycemia    Allergies    No Known Allergies    Intolerances        MEDICATIONS  (STANDING):  amLODIPine   Tablet 10 milliGRAM(s) Oral daily  calcitriol   Capsule 0.25 MICROGram(s) Oral daily  calcium acetate 667 milliGRAM(s) Oral three times a day with meals  dextrose 40% Gel 15 Gram(s) Oral once  dextrose 5%. 1000 milliLiter(s) (50 mL/Hr) IV Continuous <Continuous>  dextrose 5%. 1000 milliLiter(s) (100 mL/Hr) IV Continuous <Continuous>  dextrose 50% Injectable 25 Gram(s) IV Push once  dextrose 50% Injectable 12.5 Gram(s) IV Push once  dextrose 50% Injectable 25 Gram(s) IV Push once  ferrous    sulfate 325 milliGRAM(s) Oral daily  gabapentin 100 milliGRAM(s) Oral two times a day  glucagon  Injectable 1 milliGRAM(s) IntraMuscular once  heparin   Injectable 5000 Unit(s) SubCutaneous every 12 hours  insulin lispro (ADMELOG) corrective regimen sliding scale   SubCutaneous three times a day before meals  insulin lispro (ADMELOG) corrective regimen sliding scale   SubCutaneous at bedtime  losartan 100 milliGRAM(s) Oral daily  pantoprazole    Tablet 40 milliGRAM(s) Oral before breakfast  repaglinide 0.5 milliGRAM(s) Oral two times a day before meals

## 2022-02-22 NOTE — CONSULT NOTE ADULT - ASSESSMENT
Physical Exam:   Vital Signs Last 24 Hrs  T(C): 36.7 (22 Feb 2022 12:18), Max: 37.6 (21 Feb 2022 21:58)  T(F): 98.1 (22 Feb 2022 12:18), Max: 99.7 (21 Feb 2022 21:58)  HR: 80 (22 Feb 2022 12:18) (74 - 80)  BP: 161/76 (22 Feb 2022 12:18) (119/67 - 161/76)  BP(mean): --  RR: 16 (22 Feb 2022 12:18) (15 - 18)  SpO2: 95% (22 Feb 2022 12:18) (95% - 100%)    General: NAD, denies Fever, chills  CVS: S1S2 no M/R/G  Resp: CTA in all fields         eGFR if Non African American: 6 mL/min/1.73M2 (02-21-22 @ 09:04)  eGFR if : 7 mL/min/1.73M2 (02-21-22 @ 09:04)      CAPILLARY BLOOD GLUCOSE      POCT Blood Glucose.: 95 mg/dL (22 Feb 2022 11:41)  POCT Blood Glucose.: 107 mg/dL (22 Feb 2022 06:15)  POCT Blood Glucose.: 211 mg/dL (21 Feb 2022 21:49)      Cholesterol, Serum: 113 mg/dL (05.19.21 @ 08:36)     HDL Cholesterol, Serum: 22 mg/dL (05.19.21 @ 08:36)     LDL Cholesterol Calculated: 66 mg/dL (05.19.21 @ 08:36)     DIET: CC  >50%

## 2022-02-22 NOTE — PROGRESS NOTE ADULT - SUBJECTIVE AND OBJECTIVE BOX
DARIEN MCGINNIS    PLV 1EAS 112 W1    Allergies    No Known Allergies    Intolerances        PAST MEDICAL & SURGICAL HISTORY:  DM (diabetes mellitus)    HTN (hypertension)    Chronic kidney disease, unspecified CKD stage    ESRD on dialysis    Pneumonia    Sepsis    Anemia    No significant past surgical history        FAMILY HISTORY:      Home Medications:      MEDICATIONS  (STANDING):  amLODIPine   Tablet 10 milliGRAM(s) Oral daily  calcitriol   Capsule 0.25 MICROGram(s) Oral daily  calcium acetate 667 milliGRAM(s) Oral three times a day with meals  dextrose 40% Gel 15 Gram(s) Oral once  dextrose 5%. 1000 milliLiter(s) (50 mL/Hr) IV Continuous <Continuous>  dextrose 5%. 1000 milliLiter(s) (100 mL/Hr) IV Continuous <Continuous>  dextrose 50% Injectable 25 Gram(s) IV Push once  dextrose 50% Injectable 12.5 Gram(s) IV Push once  dextrose 50% Injectable 25 Gram(s) IV Push once  ferrous    sulfate 325 milliGRAM(s) Oral daily  gabapentin 100 milliGRAM(s) Oral two times a day  glucagon  Injectable 1 milliGRAM(s) IntraMuscular once  heparin   Injectable 5000 Unit(s) SubCutaneous every 12 hours  insulin lispro (ADMELOG) corrective regimen sliding scale   SubCutaneous three times a day before meals  insulin lispro (ADMELOG) corrective regimen sliding scale   SubCutaneous at bedtime  losartan 100 milliGRAM(s) Oral daily  pantoprazole    Tablet 40 milliGRAM(s) Oral before breakfast    MEDICATIONS  (PRN):  acetaminophen     Tablet .. 650 milliGRAM(s) Oral every 6 hours PRN Temp greater or equal to 38C (100.4F), Mild Pain (1 - 3)  aluminum hydroxide/magnesium hydroxide/simethicone Suspension 30 milliLiter(s) Oral every 4 hours PRN Dyspepsia  hydrALAZINE 10 milliGRAM(s) Oral three times a day PRN Systolic blood pressure > 170  melatonin 3 milliGRAM(s) Oral at bedtime PRN Insomnia  ondansetron Injectable 4 milliGRAM(s) IV Push every 8 hours PRN Nausea and/or Vomiting  polyethylene glycol 3350 17 Gram(s) Oral two times a day PRN Constipation  traMADol 50 milliGRAM(s) Oral three times a day PRN Moderate Pain (4 - 6)      Diet, NPO after Midnight:      NPO Start Date: 21-Feb-2022,   NPO Start Time: 23:59  Except Medications (02-21-22 @ 19:13) [Active]  Diet, Consistent Carbohydrate Renal w/Evening Snack:   Supplement Feeding Modality:  Oral  Nepro Cans or Servings Per Day:  1       Frequency:  Two Times a day (02-18-22 @ 09:18) [Active]          Vital Signs Last 24 Hrs  T(C): 36.7 (22 Feb 2022 09:30), Max: 37.6 (21 Feb 2022 21:58)  T(F): 98 (22 Feb 2022 09:30), Max: 99.7 (21 Feb 2022 21:58)  HR: 74 (22 Feb 2022 09:30) (74 - 84)  BP: 135/57 (22 Feb 2022 09:30) (119/67 - 150/68)  BP(mean): --  RR: 15 (22 Feb 2022 09:30) (15 - 18)  SpO2: 100% (22 Feb 2022 09:30) (96% - 100%)      02-21-22 @ 07:01  -  02-22-22 @ 07:00  --------------------------------------------------------  IN: 0 mL / OUT: 1500 mL / NET: -1500 mL              LABS:                        9.3    12.28 )-----------( 454      ( 21 Feb 2022 09:04 )             29.3     02-21    138  |  102  |  66<H>  ----------------------------<  105<H>  5.2   |  26  |  8.00<H>    Ca    8.8      21 Feb 2022 09:04                WBC:  WBC Count: 12.28 K/uL (02-21 @ 09:04)  WBC Count: 12.51 K/uL (02-19 @ 09:33)      MICROBIOLOGY:  RECENT CULTURES:  02-18 .Blood Blood XXXX XXXX   No growth to date.                    Sodium:  Sodium, Serum: 138 mmol/L (02-21 @ 09:04)  Sodium, Serum: 134 mmol/L (02-19 @ 09:33)      8.00 mg/dL 02-21 @ 09:04  5.90 mg/dL 02-19 @ 09:33      Hemoglobin:  Hemoglobin: 9.3 g/dL (02-21 @ 09:04)  Hemoglobin: 10.0 g/dL (02-19 @ 09:33)      Platelets: Platelet Count - Automated: 454 K/uL (02-21 @ 09:04)  Platelet Count - Automated: 487 K/uL (02-19 @ 09:33)              RADIOLOGY & ADDITIONAL STUDIES:      MICROBIOLOGY:  RECENT CULTURES:  02-18 .Blood Blood XXXX XXXX   No growth to date.

## 2022-02-22 NOTE — PROGRESS NOTE ADULT - SUBJECTIVE AND OBJECTIVE BOX
Patient is a 66y Male whom presented to the hospital with esrd on hd     PAST MEDICAL & SURGICAL HISTORY:  DM (diabetes mellitus)    HTN (hypertension)    Chronic kidney disease, unspecified CKD stage    ESRD on dialysis    Pneumonia    Sepsis    Anemia    No significant past surgical history        MEDICATIONS  (STANDING):  amLODIPine   Tablet 5 milliGRAM(s) Oral daily  amLODIPine   Tablet 5 milliGRAM(s) Oral once  dextrose 40% Gel 15 Gram(s) Oral once  dextrose 5%. 1000 milliLiter(s) (50 mL/Hr) IV Continuous <Continuous>  dextrose 5%. 1000 milliLiter(s) (100 mL/Hr) IV Continuous <Continuous>  dextrose 50% Injectable 25 Gram(s) IV Push once  dextrose 50% Injectable 12.5 Gram(s) IV Push once  dextrose 50% Injectable 25 Gram(s) IV Push once  glucagon  Injectable 1 milliGRAM(s) IntraMuscular once  insulin lispro (ADMELOG) corrective regimen sliding scale   SubCutaneous three times a day before meals  insulin lispro (ADMELOG) corrective regimen sliding scale   SubCutaneous at bedtime  pantoprazole    Tablet 40 milliGRAM(s) Oral before breakfast      Allergies    No Known Allergies    Intolerances        SOCIAL HISTORY:  Denies ETOh,Smoking,     FAMILY HISTORY:      REVIEW OF SYSTEMS:    CONSTITUTIONAL: No weakness, fevers or chills  RESPIRATORY: No cough, wheezing, hemoptysis; No shortness of breath  CARDIOVASCULAR: No chest pain or palpitations  GASTROINTESTINAL: No abdominal or epigastric pain. No nausea, vomiting,     No diarrhea or constipation. No melena                                                              9.3    12.28 )-----------( 454      ( 21 Feb 2022 09:04 )             29.3       CBC Full  -  ( 21 Feb 2022 09:04 )  WBC Count : 12.28 K/uL  RBC Count : 3.16 M/uL  Hemoglobin : 9.3 g/dL  Hematocrit : 29.3 %  Platelet Count - Automated : 454 K/uL  Mean Cell Volume : 92.7 fl  Mean Cell Hemoglobin : 29.4 pg  Mean Cell Hemoglobin Concentration : 31.7 gm/dL  Auto Neutrophil # : x  Auto Lymphocyte # : x  Auto Monocyte # : x  Auto Eosinophil # : x  Auto Basophil # : x  Auto Neutrophil % : x  Auto Lymphocyte % : x  Auto Monocyte % : x  Auto Eosinophil % : x  Auto Basophil % : x      02-21    138  |  102  |  66<H>  ----------------------------<  105<H>  5.2   |  26  |  8.00<H>    Ca    8.8      21 Feb 2022 09:04        CAPILLARY BLOOD GLUCOSE      POCT Blood Glucose.: 95 mg/dL (22 Feb 2022 11:41)  POCT Blood Glucose.: 107 mg/dL (22 Feb 2022 06:15)  POCT Blood Glucose.: 211 mg/dL (21 Feb 2022 21:49)  POCT Blood Glucose.: 90 mg/dL (21 Feb 2022 16:17)      Vital Signs Last 24 Hrs  T(C): 36.7 (22 Feb 2022 12:18), Max: 37.6 (21 Feb 2022 21:58)  T(F): 98.1 (22 Feb 2022 12:18), Max: 99.7 (21 Feb 2022 21:58)  HR: 80 (22 Feb 2022 12:18) (74 - 80)  BP: 161/76 (22 Feb 2022 12:18) (119/67 - 161/76)  BP(mean): --  RR: 16 (22 Feb 2022 12:18) (15 - 18)  SpO2: 95% (22 Feb 2022 12:18) (95% - 100%)                      PT/INR - ( 17 Feb 2022 11:32 )   PT: 12.1 sec;   INR: 1.04 ratio         PTT - ( 17 Feb 2022 11:32 )  PTT:34.4 sec        PHYSICAL EXAM:    Constitutional: NAD  HEENT: conjunctive   clear   Neck:  No JVD  Respiratory: CTAB  Cardiovascular: S1 and S2  Gastrointestinal: BS+, soft, NT/ND  Extremities: No peripheral edema

## 2022-02-22 NOTE — PROGRESS NOTE ADULT - PROBLEM SELECTOR PLAN 3
continue home medications ,BP monitoring  ,cardiology and nephrology are following
continue home medications ,BP monitoring  ,cardiology and nephrology are following
Accuchecks monitoring and insulin corrective regimen  sliding scale coverage with short acting inslulin, add longacting insulin as needed ,no concentrated sweets diet, serial labs ,HbA1C,education
continue home medications ,BP monitoring  ,cardiology and nephrology are following

## 2022-02-23 LAB
CULTURE RESULTS: SIGNIFICANT CHANGE UP
CULTURE RESULTS: SIGNIFICANT CHANGE UP
SPECIMEN SOURCE: SIGNIFICANT CHANGE UP
SPECIMEN SOURCE: SIGNIFICANT CHANGE UP

## 2022-05-03 NOTE — CONSULT NOTE ADULT - REASON FOR ADMISSION
ENDOCRINOLOGY FOLLOW UP VISIT     Chief Complaint   Patient presents with   • Diabetes Mellitus   • Hypothyroidism   • Thyroid Problem     Thyroid Nodules    • Hyperlipidemia     PCP: Kristofer Avila MD     HPI:   María Elena Aguilar is a 71 year old female here for follow up.  Additional history of lupus.  Status post hip replacement on 8/29/2018.   Previously offered Dexcom but did not prefer this.  Prefers freestyle hitesh.    DM regimen:   Lantus 6-7 units qPM  Humalog 2 units + SS     A1C 8.1%    Does not have symptoms w/hypoglycemia     Multiple thyroid nodules.  No history of FNA or radiation to the head or the neck.   Had thyroid US 3/2022 = L 0.9 cm TR5 nodule - went for FNA, per radiology not indicated     Hypothyroidism - on levothyroxine, levels low.     CGM   avg 188  Target 50%  Low 1%   High 32% very high 17%     Undergoing treatment for depression  To see psychiatrist     Full review of systems completed  Positive for weight loss, depression, anxiety  Negative for weight gain, fevers, chills, blurry vision, changes in vision, chest pain, palpitations, leg swelling, shortness of breath, wheezing, snoring, abdominal pain, nausea, vomiting, diarrhea, constipation, frequent urination, dysuria, tremors, fainting, shakes, cold intolerance, hot intolerance, foot sores, rash.    PMH:   Hypothyroidism   Lupus c/b nephritis   Hypertension   Type 2 diabetes  Osteoporosis  Arthritis     PSH:  Hip replacement     Current Outpatient Medications   Medication Sig Dispense Refill   • Temazepam 30 MG capsule TAKE 1 CAPSULE BY MOUTH EVERY DAY AT BEDTIME     • levothyroxine 88 MCG tablet 1.5 tablets Monday Wednesday and Friday,1 tablet the rest of the day 100 tablet 0   • Lantus SoloStar 100 UNIT/ML pen-injector INJECT 5 UNITS INTO THE SKIN NIGHTLY AS DIRECTED 15 mL 5   • insulin lispro (HumaLOG) 100 UNIT/ML injectable solution Pharmacy dispense up to 30 units per day - use as directed. 10 mL 12   • Insulin Syringe-Needle U-100 
30G X 5/16\" 0.5 ML Misc Type 1 diabetes injecting insulin 4 times daily 100 each 11   • simvastatin (ZOCOR) 10 MG tablet TAKE 1 TABLET BY MOUTH EVERY NIGHT 90 tablet 0   • Insulin Pen Needle (B-D U/F PEN NEEDLE 5/16\") 31G X 8 MM Misc Dispense needles to inject insulin 4 times per day. 100 each PRN   • Cholecalciferol (VITAMIN D) 50 mcg (2,000 units) capsule Take 50 mcg by mouth daily.     • benazepril (LOTENSIN) 40 MG tablet Take 40 mg by mouth daily.     • Continuous Blood Gluc  (FREESTYLE HOLLY 14 DAY READER) Device 1 kit every 14 days. 3 Device 3   • Continuous Blood Gluc Sensor (FREESTYLE HOLLY 14 DAY SENSOR) Misc 1 kit every 14 days. 3 each 3   • hydroxychloroquine (PLAQUENIL) 200 MG tablet Take 200 mg by mouth daily.      • mycophenolate (CELLCEPT) 250 MG capsule Take by mouth 2 times daily.      • metoPROLOL succinate (TOPROL-XL) 50 MG 24 hr tablet Take by mouth daily.      • AMLODIPINE BESYLATE PO Take 10 mg by mouth daily.     • hydrochlorothiazide (HYDRODIURIL) 12.5 MG tablet Take 12.5 mg by mouth daily.     • aspirin 81 MG tablet Take 81 mg by mouth daily.     • Calcium 600 MG tablet Take 1 tablet by mouth 2 times daily.     • temazepam (RESTORIL) 22.5 MG capsule      • amitriptyline (ELAVIL) 10 MG tablet Take 10 mg by mouth nightly.     • PARoxetine (PAXIL) 20 MG tablet Take 20 mg by mouth every morning.     • OMEPRAZOLE PO Take 20 mg by mouth as needed.     • celecoxib (CELEBREX) 200 MG capsule Take 200 mg by mouth as needed for Pain.     • melatonin 3 MG Take by mouth every evening.       No current facility-administered medications for this visit.      ALLERGIES:  No Known Allergies     FmHx:   No hx of DM     SocHx:  No tobacco  No alcohol  No drugs    PHYSICAL EXAMINATION:   Visit Vitals  /73 (BP Location: LUE - Left upper extremity, Patient Position: Sitting, Cuff Size: Regular)   Pulse 60   Temp 96.4 °F (35.8 °C)   Resp 16   Ht 5' 2\" (1.575 m)   Wt 56 kg (123 lb 5.6 oz)   SpO2 100% 
  BMI 22.56 kg/m²      GENERAL: Well-developed, well-nourished female in no acute distress.   HENT: normocephalic  EYES: EOMI. No lid lag, proptosis, icterus, conjunctival injection, periorbital edema.   CARDIO: Regular rate   RESP: Breathing comfortably. No use of accessory muscles.   NEUROLOGIC: Alert and oriented x3. No tremor.   PSYCHIATRIC: Pleasant, Normal affect, normal judgment.   SKIN: Normal temperature    Last visit:   FEET: no sores, sensate to 10g monofilament bilaterally    LABS:   Hemoglobin A1C (%)   Date Value   12/07/2020 8.1 (H)     Lab Results   Component Value Date    HGBA1C 8.1 (H) 12/07/2020    HGBA1C 8.5 (H) 03/03/2020    HGBA1C 6.9 (H) 06/11/2019    HGBA1C 8.7 (H) 03/25/2019     GFR Estimate, Non  (no units)   Date Value   03/03/2020 >90     Glutamic Acid Decarboxylase .6 Abnormally high   <5.0 IU/mL Final 03/25/2019  1:49 PM      C-PEPTIDE <0.1 Abnormally low   0.8 - 3.9 ng/mL Final 03/25/2019  1:49 PM ACL     Glucose 292 Abnormally high   65 - 99 mg/dL Final 03/25/2019  1:49 PM ACL [place]     CALCULATED LDL (mg/dL)   Date Value   04/05/2019 76     HDL (mg/dL)   Date Value   04/05/2019 55     GOT/AST (Units/L)   Date Value   03/15/2022 17     GPT/ALT (Units/L)   Date Value   03/15/2022 26     TSH (mcUnits/mL)   Date Value   04/20/2022 19.300 (H)     Microalbumin/ Creatinine Ratio (mg/g)   Date Value   01/20/2022 24.4     TSH (mcUnits/mL)   Date Value   04/20/2022 19.300 (H)     GOT/AST (Units/L)   Date Value   03/15/2022 17     GPT/ALT (Units/L)   Date Value   03/15/2022 26       12/2019  Thyroid US in media.     ASSESSMENT/PLAN:     Imaging, labs and provider notes reviewed     Type 1 diabetes with hyperglycemia   No severe hypoglycemia or loss of consciousness.    Does not want glucagon  Does not want pump     A1C 8.1%, a bit worse from last visit     Shantelle, continuous glucose monitor was downloaded, printed reported generated. Time range 4/20 - 5/3/22. I independently 
sent to ED for HD
sent to ED for HD
reviewed and analyzed these findings.     Overall still w/occ high and lows but improving compared to prior  Again reviewed I:C matching and timing of insulins   Also advised using SS 1:100>200 at bedtime as long as no insulin within past 2h   Will CTM on present regimen   Last ophtho eval 1/2022 = NPDR     Risk is high due to use of multiple times a day insulin and potential for severe hypo and hyperglycemia      Hypothyroidism  No hx of thyroid surgery.   Primary hypothyroidism most likely due to Hashimoto's.  TSH uncontrolled at 19 on 4/20/22 - then levothyroxine 88 mcg po tab increased to 1.5 tablets on Monday Wednesday and Friday and 1 table the rest of the days  Repeat tfts in approx 1 mo     Thyroid nodules   TR5 0.9 cm nodule on US 3/2022, went for FNA, then on repeat 4/2022 radiology deemed TR4  Will CTM with repeat US in 1 year     Return in about 4 months (around 9/3/2022).    Thank you for allowing me to participate in the care of this patient.   
sent to ED for HD
sent to ED for HD

## 2022-05-20 PROBLEM — J18.9 PNEUMONIA, UNSPECIFIED ORGANISM: Chronic | Status: ACTIVE | Noted: 2022-02-17

## 2022-05-20 PROBLEM — I10 ESSENTIAL (PRIMARY) HYPERTENSION: Chronic | Status: ACTIVE | Noted: 2022-02-17

## 2022-05-20 PROBLEM — N18.9 CHRONIC KIDNEY DISEASE, UNSPECIFIED: Chronic | Status: ACTIVE | Noted: 2022-02-17

## 2022-05-20 PROBLEM — D64.9 ANEMIA, UNSPECIFIED: Chronic | Status: ACTIVE | Noted: 2022-02-17

## 2022-05-20 PROBLEM — A41.9 SEPSIS, UNSPECIFIED ORGANISM: Chronic | Status: ACTIVE | Noted: 2022-02-17

## 2022-05-20 PROBLEM — N18.6 END STAGE RENAL DISEASE: Chronic | Status: ACTIVE | Noted: 2022-02-17

## 2022-05-20 PROBLEM — E11.9 TYPE 2 DIABETES MELLITUS WITHOUT COMPLICATIONS: Chronic | Status: ACTIVE | Noted: 2022-02-17

## 2022-06-28 PROBLEM — Z00.00 ENCOUNTER FOR PREVENTIVE HEALTH EXAMINATION: Status: ACTIVE | Noted: 2022-06-28

## 2022-08-31 ENCOUNTER — APPOINTMENT (OUTPATIENT)
Dept: TRANSPLANT | Facility: CLINIC | Age: 67
End: 2022-08-31

## 2022-08-31 ENCOUNTER — RESULT REVIEW (OUTPATIENT)
Age: 67
End: 2022-08-31

## 2022-08-31 ENCOUNTER — LABORATORY RESULT (OUTPATIENT)
Age: 67
End: 2022-08-31

## 2022-08-31 ENCOUNTER — APPOINTMENT (OUTPATIENT)
Dept: NEPHROLOGY | Facility: CLINIC | Age: 67
End: 2022-08-31

## 2022-08-31 ENCOUNTER — NON-APPOINTMENT (OUTPATIENT)
Age: 67
End: 2022-08-31

## 2022-08-31 ENCOUNTER — TRANSCRIPTION ENCOUNTER (OUTPATIENT)
Age: 67
End: 2022-08-31

## 2022-08-31 VITALS
DIASTOLIC BLOOD PRESSURE: 75 MMHG | HEART RATE: 70 BPM | TEMPERATURE: 97.9 F | SYSTOLIC BLOOD PRESSURE: 118 MMHG | WEIGHT: 134 LBS | HEIGHT: 62 IN | OXYGEN SATURATION: 99 % | BODY MASS INDEX: 24.66 KG/M2 | RESPIRATION RATE: 16 BRPM

## 2022-08-31 PROCEDURE — 99204 OFFICE O/P NEW MOD 45 MIN: CPT

## 2022-08-31 PROCEDURE — 99205 OFFICE O/P NEW HI 60 MIN: CPT

## 2022-08-31 PROCEDURE — 99072 ADDL SUPL MATRL&STAF TM PHE: CPT

## 2022-08-31 NOTE — PHYSICAL EXAM
[General Appearance - Alert] : alert [General Appearance - In No Acute Distress] : in no acute distress [Sclera] : the sclera and conjunctiva were normal [PERRL With Normal Accommodation] : pupils were equal in size, round, and reactive to light [Extraocular Movements] : extraocular movements were intact [Outer Ear] : the ears and nose were normal in appearance [Oropharynx] : the oropharynx was normal [Neck Appearance] : the appearance of the neck was normal [Neck Cervical Mass (___cm)] : no neck mass was observed [Jugular Venous Distention Increased] : there was no jugular-venous distention [Thyroid Diffuse Enlargement] : the thyroid was not enlarged [Thyroid Nodule] : there were no palpable thyroid nodules [Auscultation Breath Sounds / Voice Sounds] : lungs were clear to auscultation bilaterally [Heart Rate And Rhythm] : heart rate was normal and rhythm regular [Heart Sounds] : normal S1 and S2 [Heart Sounds Gallop] : no gallops [Murmurs] : no murmurs [Heart Sounds Pericardial Friction Rub] : no pericardial rub [Full Pulse] : the pedal pulses are present [Edema] : there was no peripheral edema [Bowel Sounds] : normal bowel sounds [Abdomen Soft] : soft [Abdomen Tenderness] : non-tender [Abdomen Mass (___ Cm)] : no abdominal mass palpated [Skin Color & Pigmentation] : normal skin color and pigmentation [Skin Turgor] : normal skin turgor [] : no rash [Normal] : normal [Deep Tendon Reflexes (DTR)] : deep tendon reflexes were 2+ and symmetric [Sensation] : the sensory exam was normal to light touch and pinprick [No Focal Deficits] : no focal deficits [Oriented To Time, Place, And Person] : oriented to person, place, and time [Impaired Insight] : insight and judgment were intact [Affect] : the affect was normal

## 2022-08-31 NOTE — PLAN
[FreeTextEntry1] : 1. ESRD on dialysis since Dec 2021-   Mr. DARIEN MCGINNIS  Is an acceptable  candidate for kidney transplantation \par 2.Cardiac risk: needs an  echo, stress test and cardiac evaluation \par 3. Cancer screening: colonoscopy, PSA\par 4. ID: Serology for acute and chronic viral infections. Screening for latent TB\par 5. Imaging: Renal/abdominal /chest /Iliac imaging\par 6.Diabetes Mellitus: check HbA1c \par \par I have personally discussed the risks and benefits of transplantation and patient attended transplant education class where the following was disclosed:\par  \par Reviewed factors affecting survival and morbidity while on dialysis, the transplant wait list and reviewed kane-operative and long-term risk factors affecting outcome in kidney transplantation. \par  \par One year SRTR outcomes for national and [facility] were discussed in regards to patient survival and graft survival after transplantation. \par  \par Details of transplant surgery, including complications were discussed.\par Immunosuppression and complications including infection including life threatening sepsis and opportunistic infections, malignancy and new onset diabetes were discussed. \par  \par Benefits of live donor transplantation as well as variability in wait times across regions and multiple listing were discussed. \par KDPI >85% and PHS high risk criteria donors were discussed. \par HCV kidney transplantation was discussed.\par  \par Will proceed with completing/ updating work up and listing for transplant/ live donor transplant once work up is reviewed and found to be acceptable by multidisciplinary listing committee.\par

## 2022-08-31 NOTE — HISTORY OF PRESENT ILLNESS
[TextBox_42] : DARIEN MCGINNIS is a 67 year old male who presents for kidney transplant evaluation. \par Cause of ESRD : DM nephropathy \par Nephrologist: Dr Black \par Started IHD in Dec 2021, via HD catheter. has appt for AVF placement \par  \par Other PMH :\par Cardiac -has HTN. No h/o  MI , CHF, CAD. has Holter monitor \par Pulmonary- no h/o COPD, Asthma\par Infections- no h/o  TB, HIV, Hepatitis  . had covid in Jan 2021. vaccinated for covid\par Heme- no h/o malignancy or bleeding disorders.No DVT/PE\par Endo-  diabetes for > 10 years, not on insulin. no retinopathy and neuropathy.  no Thyroid disease\par Neuro- no h/o CVA or seizures. \par Psych-no  suicidal ideation, depression or anxiety. \par Sensitization events- has never had  blood transfusions or previous transplant\par \par Surgical h/o : none\par Functional status: can walk 2 blocks without stopping , can climb 1-2 flights of stairs. \par Social history: lives with his wife, daughter and grandchildren.  ex smoker ( used to smoke for 40 years)  stopped 2 years ago. No alcohol or illicit drug use. \par Family history:  no h/o kidney disease or malignancy\par \par \par \par

## 2022-09-01 LAB
ABO + RH PNL BLD: NORMAL
ABO + RH PNL BLD: NORMAL
ALBUMIN SERPL ELPH-MCNC: 4.7 G/DL
ALP BLD-CCNC: 123 U/L
ALT SERPL-CCNC: 8 U/L
ANION GAP SERPL CALC-SCNC: 14 MMOL/L
AST SERPL-CCNC: 14 U/L
BASOPHILS # BLD AUTO: 0.08 K/UL
BASOPHILS NFR BLD AUTO: 0.9 %
BILIRUB SERPL-MCNC: 0.4 MG/DL
BUN SERPL-MCNC: 27 MG/DL
C PEPTIDE SERPL-MCNC: 16.1 NG/ML
CALCIUM SERPL-MCNC: 9.7 MG/DL
CHLORIDE SERPL-SCNC: 95 MMOL/L
CO2 SERPL-SCNC: 31 MMOL/L
COVID-19 SPIKE DOMAIN ANTIBODY INTERPRETATION: POSITIVE
CREAT SERPL-MCNC: 6.17 MG/DL
EGFR: 9 ML/MIN/1.73M2
EOSINOPHIL # BLD AUTO: 0.63 K/UL
EOSINOPHIL NFR BLD AUTO: 7 %
ESTIMATED AVERAGE GLUCOSE: 148 MG/DL
GLUCOSE SERPL-MCNC: 110 MG/DL
HBA1C MFR BLD HPLC: 6.8 %
HBV CORE IGG+IGM SER QL: NONREACTIVE
HBV SURFACE AB SER QL: REACTIVE
HBV SURFACE AB SERPL IA-ACNC: 60.2 MIU/ML
HBV SURFACE AG SER QL: NONREACTIVE
HCG SERPL-MCNC: 1 MIU/ML
HCT VFR BLD CALC: 40.1 %
HCV AB SER QL: NONREACTIVE
HCV S/CO RATIO: 0.12 S/CO
HEPATITIS A IGG ANTIBODY: REACTIVE
HGB BLD-MCNC: 12.1 G/DL
HIV1+2 AB SPEC QL IA.RAPID: NONREACTIVE
LYMPHOCYTES # BLD AUTO: 1.97 K/UL
LYMPHOCYTES NFR BLD AUTO: 21.7 %
MAGNESIUM SERPL-MCNC: 2.9 MG/DL
MAN DIFF?: NORMAL
MCHC RBC-ENTMCNC: 30.2 GM/DL
MCHC RBC-ENTMCNC: 32.6 PG
MCV RBC AUTO: 108.1 FL
MONOCYTES # BLD AUTO: 0.94 K/UL
MONOCYTES NFR BLD AUTO: 10.4 %
NEUTROPHILS # BLD AUTO: 5.05 K/UL
NEUTROPHILS NFR BLD AUTO: 55.7 %
PHOSPHATE SERPL-MCNC: 4.7 MG/DL
PLATELET # BLD AUTO: 363 K/UL
POTASSIUM SERPL-SCNC: 4.6 MMOL/L
PROT SERPL-MCNC: 7.7 G/DL
PSA SERPL-MCNC: 0.67 NG/ML
RBC # BLD: 3.71 M/UL
RBC # FLD: 17.7 %
SARS-COV-2 AB SERPL IA-ACNC: >250 U/ML
SODIUM SERPL-SCNC: 141 MMOL/L
T PALLIDUM AB SER QL IA: NEGATIVE
URATE SERPL-MCNC: 4.4 MG/DL
WBC # FLD AUTO: 9.06 K/UL

## 2022-09-01 NOTE — ASSESSMENT
[Good candidate] : a good candidate. We should proceed with our protocol for evaluation for kidney transplantation. [FreeTextEntry1] : Good candidate\par long term diabetic; will get CT angio\par CT chest\par

## 2022-09-01 NOTE — HISTORY OF PRESENT ILLNESS
[Diabetes Mellitus] : Diabetes Mellitus [Previous Kidney Transplant] : no previous kidney transplant [Cardiac History] : no cardiac history [TextBox_16] : December 2021 [TextBox_42] : 67 year old male with ESRD on HD since December 2021. \par patient originally from [place]; speaks some English and interviewed with help of daughter\par cause of kidney failure is probably diabetic nephropathy. he is a diabetic for 18 years on oral hypoglycemics\par high blood pressure for 4 years\par no cardiac history\par peripheral neuropathy\par cataract surgery\par no other surgeries\par no allergies\par Social history;  and has three daughters; 38, 40 and 42\par he stopped smoking 3 years ago and stopped drinking alcohol 2 years ago. \par he generally feels well, can walk 2 blocks  then feels tired, \par

## 2022-09-02 LAB
CMV IGG SERPL QL: >10 U/ML
CMV IGG SERPL-IMP: POSITIVE
EBV EA AB SER IA-ACNC: 54.8 U/ML
EBV EA AB TITR SER IF: POSITIVE
EBV EA IGG SER QL IA: 540 U/ML
EBV EA IGG SER-ACNC: POSITIVE
EBV EA IGM SER IA-ACNC: NEGATIVE
EBV PATRN SPEC IB-IMP: NORMAL
EBV VCA IGG SER IA-ACNC: 718 U/ML
EBV VCA IGM SER QL IA: <10 U/ML
EPSTEIN-BARR VIRUS CAPSID ANTIGEN IGG: POSITIVE
HSV 1+2 IGG SER IA-IMP: NEGATIVE
HSV 1+2 IGG SER IA-IMP: POSITIVE
HSV1 IGG SER QL: 56.9 INDEX
HSV2 IGG SER QL: 0.09 INDEX
ROGOSIN: NORMAL
RUBV IGG FLD-ACNC: 17.3 INDEX
RUBV IGG SER-IMP: POSITIVE
T GONDII AB SER-IMP: POSITIVE
T GONDII IGG SER QL: 59 IU/ML
VZV AB TITR SER: POSITIVE
VZV IGG SER IF-ACNC: 204.8 INDEX

## 2022-09-06 LAB
M TB IFN-G BLD-IMP: POSITIVE
QUANTIFERON TB PLUS MITOGEN MINUS NIL: 9.94 IU/ML
QUANTIFERON TB PLUS NIL: 0.06 IU/ML
QUANTIFERON TB PLUS TB1 MINUS NIL: 0.86 IU/ML
QUANTIFERON TB PLUS TB2 MINUS NIL: 0.71 IU/ML

## 2022-09-19 ENCOUNTER — OUTPATIENT (OUTPATIENT)
Dept: OUTPATIENT SERVICES | Facility: HOSPITAL | Age: 67
LOS: 1 days | End: 2022-09-19
Payer: COMMERCIAL

## 2022-09-19 ENCOUNTER — APPOINTMENT (OUTPATIENT)
Dept: CT IMAGING | Facility: CLINIC | Age: 67
End: 2022-09-19

## 2022-09-19 DIAGNOSIS — Z01.818 ENCOUNTER FOR OTHER PREPROCEDURAL EXAMINATION: ICD-10-CM

## 2022-09-19 PROCEDURE — 71260 CT THORAX DX C+: CPT

## 2022-09-19 PROCEDURE — 74174 CTA ABD&PLVS W/CONTRAST: CPT

## 2022-09-19 PROCEDURE — 71260 CT THORAX DX C+: CPT | Mod: 26

## 2022-09-19 PROCEDURE — 74174 CTA ABD&PLVS W/CONTRAST: CPT | Mod: 26

## 2022-10-10 ENCOUNTER — NON-APPOINTMENT (OUTPATIENT)
Age: 67
End: 2022-10-10

## 2022-10-10 ENCOUNTER — APPOINTMENT (OUTPATIENT)
Dept: CARDIOLOGY | Facility: CLINIC | Age: 67
End: 2022-10-10

## 2022-10-10 VITALS
HEART RATE: 68 BPM | DIASTOLIC BLOOD PRESSURE: 84 MMHG | OXYGEN SATURATION: 100 % | HEIGHT: 62 IN | BODY MASS INDEX: 24.29 KG/M2 | SYSTOLIC BLOOD PRESSURE: 136 MMHG | WEIGHT: 132 LBS

## 2022-10-10 DIAGNOSIS — Z87.898 PERSONAL HISTORY OF OTHER SPECIFIED CONDITIONS: ICD-10-CM

## 2022-10-10 DIAGNOSIS — Z87.891 PERSONAL HISTORY OF NICOTINE DEPENDENCE: ICD-10-CM

## 2022-10-10 DIAGNOSIS — R73.9 HYPERGLYCEMIA, UNSPECIFIED: ICD-10-CM

## 2022-10-10 PROCEDURE — 93000 ELECTROCARDIOGRAM COMPLETE: CPT | Mod: NC

## 2022-10-10 PROCEDURE — 99205 OFFICE O/P NEW HI 60 MIN: CPT

## 2022-10-10 PROCEDURE — 99072 ADDL SUPL MATRL&STAF TM PHE: CPT

## 2022-10-12 ENCOUNTER — APPOINTMENT (OUTPATIENT)
Dept: INFECTIOUS DISEASE | Facility: CLINIC | Age: 67
End: 2022-10-12

## 2022-10-12 ENCOUNTER — LABORATORY RESULT (OUTPATIENT)
Age: 67
End: 2022-10-12

## 2022-10-12 VITALS
HEART RATE: 67 BPM | WEIGHT: 134 LBS | HEIGHT: 62 IN | TEMPERATURE: 98.7 F | SYSTOLIC BLOOD PRESSURE: 118 MMHG | OXYGEN SATURATION: 97 % | DIASTOLIC BLOOD PRESSURE: 76 MMHG | BODY MASS INDEX: 24.66 KG/M2

## 2022-10-12 DIAGNOSIS — Z22.7 LATENT TUBERCULOSIS: ICD-10-CM

## 2022-10-12 PROCEDURE — 99204 OFFICE O/P NEW MOD 45 MIN: CPT | Mod: GC

## 2022-10-12 PROCEDURE — 99072 ADDL SUPL MATRL&STAF TM PHE: CPT

## 2022-10-13 LAB
ALBUMIN SERPL ELPH-MCNC: 4.7 G/DL
ALP BLD-CCNC: 152 U/L
ALT SERPL-CCNC: 7 U/L
ANION GAP SERPL CALC-SCNC: 21 MMOL/L
AST SERPL-CCNC: 20 U/L
BASOPHILS # BLD AUTO: 0.16 K/UL
BASOPHILS NFR BLD AUTO: 1.8 %
BILIRUB SERPL-MCNC: 0.4 MG/DL
BUN SERPL-MCNC: 25 MG/DL
CALCIUM SERPL-MCNC: 9.6 MG/DL
CHLORIDE SERPL-SCNC: 95 MMOL/L
CO2 SERPL-SCNC: 21 MMOL/L
CREAT SERPL-MCNC: 5.55 MG/DL
EGFR: 11 ML/MIN/1.73M2
EOSINOPHIL # BLD AUTO: 0.47 K/UL
EOSINOPHIL NFR BLD AUTO: 5.3 %
GLUCOSE SERPL-MCNC: 147 MG/DL
HCT VFR BLD CALC: 44.2 %
HGB BLD-MCNC: 13.7 G/DL
LYMPHOCYTES # BLD AUTO: 1.55 K/UL
LYMPHOCYTES NFR BLD AUTO: 17.7 %
MAN DIFF?: NORMAL
MCHC RBC-ENTMCNC: 31 GM/DL
MCHC RBC-ENTMCNC: 32.9 PG
MCV RBC AUTO: 106 FL
MONOCYTES # BLD AUTO: 0.54 K/UL
MONOCYTES NFR BLD AUTO: 6.2 %
NEUTROPHILS # BLD AUTO: 5.75 K/UL
NEUTROPHILS NFR BLD AUTO: 65.5 %
PLATELET # BLD AUTO: 329 K/UL
POTASSIUM SERPL-SCNC: 6 MMOL/L
PROT SERPL-MCNC: 7.8 G/DL
RBC # BLD: 4.17 M/UL
RBC # FLD: 15.9 %
SODIUM SERPL-SCNC: 137 MMOL/L
WBC # FLD AUTO: 8.78 K/UL

## 2022-10-13 RX ORDER — CALCIUM ACETATE 667 MG/1
667 TABLET ORAL
Qty: 90 | Refills: 0 | Status: COMPLETED | COMMUNITY
Start: 2022-04-18 | End: 2022-10-13

## 2022-10-13 RX ORDER — ALBUTEROL SULFATE 90 UG/1
108 (90 BASE) INHALANT RESPIRATORY (INHALATION)
Refills: 0 | Status: ACTIVE | COMMUNITY
Start: 2022-09-30

## 2022-10-13 RX ORDER — AMLODIPINE BESYLATE 5 MG/1
5 TABLET ORAL
Qty: 30 | Refills: 0 | Status: COMPLETED | COMMUNITY
Start: 2022-04-16 | End: 2022-10-13

## 2022-10-13 NOTE — PHYSICAL EXAM
How Severe Are Your Warts?: moderate Is This A New Presentation, Or A Follow-Up?: Warts [General Appearance - Alert] : alert [General Appearance - In No Acute Distress] : in no acute distress [Sclera] : the sclera and conjunctiva were normal [PERRL With Normal Accommodation] : pupils were equal in size, round, reactive to light [Extraocular Movements] : extraocular movements were intact [Outer Ear] : the ears and nose were normal in appearance [Oropharynx] : the oropharynx was normal with no thrush [Neck Appearance] : the appearance of the neck was normal [Neck Cervical Mass (___cm)] : no neck mass was observed [Jugular Venous Distention Increased] : there was no jugular-venous distention [Thyroid Diffuse Enlargement] : the thyroid was not enlarged [Auscultation Breath Sounds / Voice Sounds] : lungs were clear to auscultation bilaterally [Heart Rate And Rhythm] : heart rate was normal and rhythm regular [Heart Sounds] : normal S1 and S2 [Heart Sounds Gallop] : no gallops [Murmurs] : no murmurs [Heart Sounds Pericardial Friction Rub] : no pericardial rub [Full Pulse] : the pedal pulses are present [Edema] : there was no peripheral edema [Bowel Sounds] : normal bowel sounds [Abdomen Soft] : soft [Abdomen Tenderness] : non-tender [Abdomen Mass (___ Cm)] : no abdominal mass palpated [Costovertebral Angle Tenderness] : no CVA tenderness [Musculoskeletal - Swelling] : no joint swelling [Nail Clubbing] : no clubbing  or cyanosis of the fingernails [Motor Tone] : muscle strength and tone were normal [Skin Color & Pigmentation] : normal skin color and pigmentation [] : no rash [No Palpable Adenopathy] : no palpable adenopathy [Oriented To Time, Place, And Person] : oriented to person, place, and time [Affect] : the affect was normal

## 2022-10-13 NOTE — HISTORY OF PRESENT ILLNESS
[FreeTextEntry1] : 67M with HTN, DM, ESRD on HD since December 2021, presents for Pre-Kidney Transplant Evaluation.\par \par Accompanied with daughter helping with translation. Patient states feeling well. \par Denies any TB symptoms, night sweats, hemoptysis, fever or chills, weight loss. \par Patient denies any prior infection, aside from COVID in 2021. Reports being vaccinated. \par \par Patient born and raised in [place], moved to [place] in [place]\par Work in Sales\par Travel to [place] 2021\par Denies TB exposure\par Denies Pets\par Lives with daughter\par \par Soc:  and has 3 daughters (38, 40, 42), former smoker, stopped 3 years ago, former ETOH drinker, stopped 2 years ago, denies IVDU

## 2022-10-13 NOTE — END OF VISIT
[] : Fellow [FreeTextEntry3] : Patient seen and examined with Dr Negron\par I agree with his interval history exam and plans as noted above\par Patient with positive quantiferon and negative chest CT to begin latent Tb meds. prior to transplant kidney\par short course therapy x12 weeks\par monthly labs after baselines today\par RTC1 mo

## 2022-10-13 NOTE — ASSESSMENT
[FreeTextEntry1] : 67M with HTN, DM, ESRD on HD since December 2021, presents for Pre-Kidney Transplant Evaluation.\par \par #Pre-Kidney Transplant Evaluation\par #ESRD on HD\par -HAV IgG positive\par -HBsAb positive, HBsAg negative, HBVc total Ab negative\par -HCV PCR negative\par -HIV Screen negative\par -HSV 1 postive / HSV 2 IgG positive\par -Rubella positive\par -VZV IgG positive\par -CMV IgG positive\par -EBV serology past infection\par -Syphilis screen negative\par -Toxo IgG positive\par -Quantgold positive\par PLAN\par -obtain Measles, Mumps, Strongyloides Ab \par -obtain GI PCR\par \par #Latent TB\par -QuantGold Positive\par -CT Chest with IV Contrast (9/2022) with patent central airways, No pulmonary nodules, no pleural effusions, No mediastinal or hilar lymphadenopathy \par PLAN\par -coordinating with pharmacy team for short course regimen for Latent TB\par - will start Rifapentine and INH for 3 months\par - monitor CMP \par \par #Vaccination\par - COVID x2 pfizer (last dose 3/2022)\par - Pneumococcal: reported 3/2022\par - Tdap: reported 2017\par - Hep B: immune\par - Hep A: immune\par  Shingles: reported 4/2022\par PLAN\par - declined Flu vaccine today, stating Primary Care Doctor is planning to administer in 1 month

## 2022-10-21 NOTE — DISCUSSION/SUMMARY
[EKG obtained to assist in diagnosis and management of assessed problem(s)] : EKG obtained to assist in diagnosis and management of assessed problem(s) [FreeTextEntry1] : He is a 67 year old  man who presents today for preoperative cardiovascular evaluation prior to possible kidney transplant with known cardiac risk factors as above.\par \par His blood pressure is within acceptable limits. No changes to his medications were suggested.\par Recent ECG, echocardiography and nuclear stress testing as above.\par \par No further cardiac testing is required prior to transplant consideration.\par \par Annual follow up, or sooner if necessary. \par

## 2022-10-21 NOTE — CARDIOLOGY SUMMARY
[de-identified] : 10/10/2022. Normal sinus rhythm at 72 BPM. LAE. Left axis-anterior fascicular block. Nonspecific T- abnormality. Low voltage in the limb leads. Poor R-wave progression.  [de-identified] : 8/25/2022. Pharmacologic nuclear stress test. \par 1. Normal study.\par 2. Non diagnostic Regadenoson ECG in the setting of baseline ST-T abnormalities.\par 3. Normal perfusion imaging.\par 4. Normal left ventricualr size and systolic function. Post-stress EF is 74%. [de-identified] : 9/1/2022. Transthoracic echocardiogram. LVEF 73%.\par 1. LV cavity is small. Increased relative wall thickness with normal LV mass, consistent with mild concentric remodeling. Ther eis hyperdynamic left ventricular systolic function. LVEF is 73%. Left ventricular diastolic parameters are consistent with normal diastolic function.\par 2. Resting Segmental Wall Motion Analysis: Total wall motion score is 1.12. There is akinesis of the basal inferior wall. The remaining left ventricular segments demonstrate normal wall motion. \par 3. Normal right ventricular size and systolic function.\par 4. The right atrium is normal in size.\par 5. Mitral valve leaflets appear mildly thickened. There is trivial mitral regurgitation.\par 6. Aortic valve cusps appear mildly calcified. No aortic valve stenosis. No aortic regurgitation.\par 7. PA systolic pressure could not be estimated secondary to insufficient tricuspid regurgitant jet.\par 8. Pericardium is not well visualized. Trivial pericardial effusion.

## 2022-10-21 NOTE — HISTORY OF PRESENT ILLNESS
[FreeTextEntry1] : Jens Lyman presents today for preoperative cardiovascular evaluation prior to possible kidney transplant. \par He is a 67 year old [place] man living in the  for 5 years with known cardiac risk factors of chronic hypertension, hyperlipidemia, diabetes mellitus (never on insulin, A1c 6.8%), being a former smoker (0.5 PPD x 25-30 years, quit 10 years ago) and end stage renal disease on hemodialysis (since 12/2021, right chest permacath, immature left radial AV fistula).\par Today he is feeling well. HIs daughter, Kaitlin, serves as a  at the patient's request. He was first diagnosed with renal disease 2 years ago after he was found to have abnormal labs with his primary care physician. Hemodialysis was initiated in December of 2021 and  he continues on hemodialysis on a T-R-S schedule at [facility] in [place]. He does report some intradialytic hypotension for which he uses midodrine on an as needed basis. \par For exercise, he walks and strives to achieve 5000 steps daily. On non dialysis days he often feels tired and depleted, so he walks less. He denies any chest discomfort, shortness of breath, palpitations, lightheadedness or syncope.\par He has no known history of coronary artery disease. \par Mr. Lyman is . He has 3 adult children; one daughter and two sons and 7 grandchildren.\par Previously he worked as a .\par

## 2022-10-21 NOTE — END OF VISIT
[Time Spent: ___ minutes] : I have spent [unfilled] minutes of time on the encounter. [FreeTextEntry3] : I saw the patient with Vicki De La Garza NP and I agree with the findings and plan as above.

## 2022-10-21 NOTE — PHYSICAL EXAM
[Well Developed] : well developed [Well Nourished] : well nourished [No Acute Distress] : no acute distress [Normal Conjunctiva] : normal conjunctiva [Normal Venous Pressure] : normal venous pressure [No Carotid Bruit] : no carotid bruit [Normal S1, S2] : normal S1, S2 [No Murmur] : no murmur [No Rub] : no rub [No Gallop] : no gallop [Clear Lung Fields] : clear lung fields [Good Air Entry] : good air entry [No Respiratory Distress] : no respiratory distress  [Soft] : abdomen soft [Non Tender] : non-tender [No Masses/organomegaly] : no masses/organomegaly [Normal Bowel Sounds] : normal bowel sounds [Normal Gait] : normal gait [No Edema] : no edema [No Cyanosis] : no cyanosis [No Clubbing] : no clubbing [No Varicosities] : no varicosities [No Rash] : no rash [No Skin Lesions] : no skin lesions [Moves all extremities] : moves all extremities [No Focal Deficits] : no focal deficits [Normal Speech] : normal speech [Alert and Oriented] : alert and oriented [Normal memory] : normal memory [de-identified] : right chest dialysis catheter [de-identified] : immature left radial AVF +/+

## 2022-10-28 ENCOUNTER — APPOINTMENT (OUTPATIENT)
Dept: GASTROENTEROLOGY | Facility: CLINIC | Age: 67
End: 2022-10-28

## 2022-10-28 VITALS
SYSTOLIC BLOOD PRESSURE: 132 MMHG | WEIGHT: 133 LBS | TEMPERATURE: 97.3 F | BODY MASS INDEX: 24.48 KG/M2 | HEART RATE: 76 BPM | RESPIRATION RATE: 12 BRPM | DIASTOLIC BLOOD PRESSURE: 76 MMHG | OXYGEN SATURATION: 98 % | HEIGHT: 62 IN

## 2022-10-28 DIAGNOSIS — Z86.39 PERSONAL HISTORY OF OTHER ENDOCRINE, NUTRITIONAL AND METABOLIC DISEASE: ICD-10-CM

## 2022-10-28 DIAGNOSIS — Z86.79 PERSONAL HISTORY OF OTHER DISEASES OF THE CIRCULATORY SYSTEM: ICD-10-CM

## 2022-10-28 DIAGNOSIS — N18.9 CHRONIC KIDNEY DISEASE, UNSPECIFIED: ICD-10-CM

## 2022-10-28 PROCEDURE — 99072 ADDL SUPL MATRL&STAF TM PHE: CPT

## 2022-10-28 PROCEDURE — 99204 OFFICE O/P NEW MOD 45 MIN: CPT

## 2022-10-28 NOTE — CONSULT LETTER
[Dear  ___] : Dear  [unfilled], [Consult Letter:] : I had the pleasure of evaluating your patient, [unfilled]. [( Thank you for referring [unfilled] for consultation for _____ )] : Thank you for referring [unfilled] for consultation for [unfilled] [Please see my note below.] : Please see my note below. [Consult Closing:] : Thank you very much for allowing me to participate in the care of this patient.  If you have any questions, please do not hesitate to contact me. [Sincerely,] : Sincerely, [FreeTextEntry3] : Colt Mars MD\par \par Gastroenterology\par [facility]\par [facility]\par \par

## 2022-10-28 NOTE — ASSESSMENT
[FreeTextEntry1] : DARIEN MCGINNIS was advised to undergo colonoscopy to which he agreed. The procedure will be performed in [facility] \par [facility] with the assistance of an anesthesiologist. The patient was given a Golytely  preparation prescription and understood the \par procedure as it was explained to his. He was given a booklet distributed by the American Society of Gastrointestinal\par  Endoscopy explaining the procedure in detail and he understood the risks of the procedure not limited to infection, bleeding,\par perforation or non- diagnosis of colorectal cancer. He was advised that he could not drive home, if he chooses to\par  receive sedation.\par \par Further diagnostic and treatment recommendations will be based upon the procedure and any biopsies, if they are taken.\par \par Thank you for allowing me to participate in this [facility] health care.\par \par , Best personal regards -- Don\par

## 2022-10-28 NOTE — HISTORY OF PRESENT ILLNESS
[FreeTextEntry1] : He is a 67 year old asymptomatic male referred for a Pre Kidney transplant  colon evaluation. He has never had a colonoscopy  before. He denies  a family history of colon cancer.  He is on dialysis Tuesday Thursday  and Saturday.\par \par His daughter Kaitlin was in the room and acted as a

## 2022-11-11 ENCOUNTER — NON-APPOINTMENT (OUTPATIENT)
Age: 67
End: 2022-11-11

## 2022-11-21 LAB
ALBUMIN SERPL ELPH-MCNC: 4.6 G/DL
ALP BLD-CCNC: 157 U/L
ALT SERPL-CCNC: <5 U/L
ANION GAP SERPL CALC-SCNC: 18 MMOL/L
AST SERPL-CCNC: 18 U/L
BASOPHILS # BLD AUTO: 0.05 K/UL
BASOPHILS NFR BLD AUTO: 0.6 %
BILIRUB SERPL-MCNC: 0.4 MG/DL
BUN SERPL-MCNC: 46 MG/DL
CALCIUM SERPL-MCNC: 9.2 MG/DL
CHLORIDE SERPL-SCNC: 95 MMOL/L
CO2 SERPL-SCNC: 25 MMOL/L
CREAT SERPL-MCNC: 6.53 MG/DL
EGFR: 9 ML/MIN/1.73M2
EOSINOPHIL # BLD AUTO: 0.31 K/UL
EOSINOPHIL NFR BLD AUTO: 3.7 %
GLUCOSE SERPL-MCNC: 193 MG/DL
HCT VFR BLD CALC: 36.9 %
HGB BLD-MCNC: 12.5 G/DL
IMM GRANULOCYTES NFR BLD AUTO: 0.4 %
LYMPHOCYTES # BLD AUTO: 2.62 K/UL
LYMPHOCYTES NFR BLD AUTO: 31.3 %
MAN DIFF?: NORMAL
MCHC RBC-ENTMCNC: 33.9 GM/DL
MCHC RBC-ENTMCNC: 34.8 PG
MCV RBC AUTO: 102.8 FL
MONOCYTES # BLD AUTO: 0.7 K/UL
MONOCYTES NFR BLD AUTO: 8.4 %
NEUTROPHILS # BLD AUTO: 4.65 K/UL
NEUTROPHILS NFR BLD AUTO: 55.6 %
PLATELET # BLD AUTO: 320 K/UL
POTASSIUM SERPL-SCNC: 6.1 MMOL/L
PROT SERPL-MCNC: 7.6 G/DL
RBC # BLD: 3.59 M/UL
RBC # FLD: 15.7 %
SODIUM SERPL-SCNC: 139 MMOL/L
WBC # FLD AUTO: 8.36 K/UL

## 2022-12-12 LAB
ALBUMIN SERPL ELPH-MCNC: 4.6 G/DL
ALP BLD-CCNC: 164 U/L
ALT SERPL-CCNC: <5 U/L
ANION GAP SERPL CALC-SCNC: 15 MMOL/L
AST SERPL-CCNC: 11 U/L
BASOPHILS # BLD AUTO: 0.06 K/UL
BASOPHILS NFR BLD AUTO: 0.6 %
BILIRUB SERPL-MCNC: 0.4 MG/DL
BUN SERPL-MCNC: 41 MG/DL
CALCIUM SERPL-MCNC: 9.5 MG/DL
CHLORIDE SERPL-SCNC: 96 MMOL/L
CO2 SERPL-SCNC: 27 MMOL/L
CREAT SERPL-MCNC: 6.1 MG/DL
EGFR: 9 ML/MIN/1.73M2
EOSINOPHIL # BLD AUTO: 0.4 K/UL
EOSINOPHIL NFR BLD AUTO: 3.9 %
GLUCOSE SERPL-MCNC: 187 MG/DL
HCT VFR BLD CALC: 34.7 %
HGB BLD-MCNC: 11.3 G/DL
IMM GRANULOCYTES NFR BLD AUTO: 0.5 %
LYMPHOCYTES # BLD AUTO: 3.1 K/UL
LYMPHOCYTES NFR BLD AUTO: 30.1 %
MAN DIFF?: NORMAL
MCHC RBC-ENTMCNC: 32.6 GM/DL
MCHC RBC-ENTMCNC: 33.7 PG
MCV RBC AUTO: 103.6 FL
MONOCYTES # BLD AUTO: 0.86 K/UL
MONOCYTES NFR BLD AUTO: 8.4 %
NEUTROPHILS # BLD AUTO: 5.82 K/UL
NEUTROPHILS NFR BLD AUTO: 56.5 %
PLATELET # BLD AUTO: 344 K/UL
POTASSIUM SERPL-SCNC: 5.3 MMOL/L
PROT SERPL-MCNC: 7.7 G/DL
RBC # BLD: 3.35 M/UL
RBC # FLD: 16.5 %
SODIUM SERPL-SCNC: 138 MMOL/L
WBC # FLD AUTO: 10.29 K/UL

## 2022-12-13 ENCOUNTER — RESULT REVIEW (OUTPATIENT)
Age: 67
End: 2022-12-13

## 2022-12-14 ENCOUNTER — APPOINTMENT (OUTPATIENT)
Dept: GASTROENTEROLOGY | Facility: AMBULATORY SURGERY CENTER | Age: 67
End: 2022-12-14

## 2022-12-14 DIAGNOSIS — Z12.11 ENCOUNTER FOR SCREENING FOR MALIGNANT NEOPLASM OF COLON: ICD-10-CM

## 2022-12-14 DIAGNOSIS — Z86.010 PERSONAL HISTORY OF COLONIC POLYPS: ICD-10-CM

## 2022-12-14 PROCEDURE — 45380 COLONOSCOPY AND BIOPSY: CPT

## 2023-01-01 ENCOUNTER — LABORATORY RESULT (OUTPATIENT)
Age: 68
End: 2023-01-01

## 2023-01-02 ENCOUNTER — NON-APPOINTMENT (OUTPATIENT)
Age: 68
End: 2023-01-02

## 2023-01-03 LAB
ALBUMIN SERPL ELPH-MCNC: 4.5 G/DL
ALP BLD-CCNC: 149 U/L
ALT SERPL-CCNC: <5 U/L
ANION GAP SERPL CALC-SCNC: 14 MMOL/L
AST SERPL-CCNC: 15 U/L
BASOPHILS # BLD AUTO: 0.06 K/UL
BASOPHILS NFR BLD AUTO: 0.9 %
BILIRUB SERPL-MCNC: 0.4 MG/DL
BUN SERPL-MCNC: 39 MG/DL
CALCIUM SERPL-MCNC: 9.1 MG/DL
CHLORIDE SERPL-SCNC: 98 MMOL/L
CO2 SERPL-SCNC: 28 MMOL/L
CREAT SERPL-MCNC: 6.43 MG/DL
EGFR: 9 ML/MIN/1.73M2
EOSINOPHIL # BLD AUTO: 0.37 K/UL
EOSINOPHIL NFR BLD AUTO: 5.4 %
GLUCOSE SERPL-MCNC: 183 MG/DL
HCT VFR BLD CALC: 33.5 %
HGB BLD-MCNC: 10.9 G/DL
IMM GRANULOCYTES NFR BLD AUTO: 0.3 %
LYMPHOCYTES # BLD AUTO: 1.92 K/UL
LYMPHOCYTES NFR BLD AUTO: 28 %
MAN DIFF?: NORMAL
MCHC RBC-ENTMCNC: 32.5 GM/DL
MCHC RBC-ENTMCNC: 35.6 PG
MCV RBC AUTO: 109.5 FL
MONOCYTES # BLD AUTO: 0.59 K/UL
MONOCYTES NFR BLD AUTO: 8.6 %
NEUTROPHILS # BLD AUTO: 3.9 K/UL
NEUTROPHILS NFR BLD AUTO: 56.8 %
PLATELET # BLD AUTO: 396 K/UL
POTASSIUM SERPL-SCNC: 5.5 MMOL/L
PROT SERPL-MCNC: 7.3 G/DL
RBC # BLD: 3.06 M/UL
RBC # FLD: 18.2 %
SODIUM SERPL-SCNC: 141 MMOL/L
WBC # FLD AUTO: 6.86 K/UL

## 2023-02-02 RX ORDER — ISONIAZID 300 MG/1
300 TABLET ORAL
Qty: 12 | Refills: 2 | Status: COMPLETED | COMMUNITY
Start: 2022-10-12 | End: 2023-02-02

## 2023-02-02 RX ORDER — RIFAPENTINE 150 MG/1
150 TABLET, FILM COATED ORAL
Qty: 24 | Refills: 2 | Status: COMPLETED | COMMUNITY
Start: 2022-10-12 | End: 2023-02-02

## 2023-02-02 RX ORDER — UREA 10 %
50 LOTION (ML) TOPICAL DAILY
Qty: 30 | Refills: 2 | Status: COMPLETED | COMMUNITY
Start: 2022-10-12 | End: 2023-02-02

## 2023-02-03 ENCOUNTER — NON-APPOINTMENT (OUTPATIENT)
Age: 68
End: 2023-02-03

## 2023-02-13 ENCOUNTER — APPOINTMENT (OUTPATIENT)
Dept: UROLOGY | Facility: CLINIC | Age: 68
End: 2023-02-13
Payer: MEDICAID

## 2023-02-13 VITALS
SYSTOLIC BLOOD PRESSURE: 122 MMHG | BODY MASS INDEX: 24.48 KG/M2 | DIASTOLIC BLOOD PRESSURE: 73 MMHG | HEIGHT: 62 IN | WEIGHT: 133 LBS | HEART RATE: 75 BPM | RESPIRATION RATE: 17 BRPM

## 2023-02-13 DIAGNOSIS — N28.1 CYST OF KIDNEY, ACQUIRED: ICD-10-CM

## 2023-02-13 PROCEDURE — 99202 OFFICE O/P NEW SF 15 MIN: CPT

## 2023-02-13 NOTE — HISTORY OF PRESENT ILLNESS
[FreeTextEntry1] : Here with his daughter today for evaluation.  Patient end-stage renal disease on hemodialysis 3 times a week.  He is undergoing a transplant evaluation.  He has been on dialysis for about 1 year.  CT chest/abdomen/pelvis September 2022: 1.7 cm complex left renal cyst with indeterminate nodularity.\par \par Patient denies flank pain or gross hematuria.  He voids once daily in the morning.  No previous urologic history.\par \par PSA August 2022: 0.67 ng/mL

## 2023-02-13 NOTE — ASSESSMENT
[FreeTextEntry1] : CT imaging results reviewed with the patient and his daughter.  Left kidney lesion appears to be a complex cyst.\par Recommend renal ultrasound to assess whether evidence of vascular flow or nodularity of the cyst.  We will also determine if change in size since his previous imaging in September 2022.  We will call the patient with results of the ultrasound.

## 2023-03-04 ENCOUNTER — APPOINTMENT (OUTPATIENT)
Dept: ULTRASOUND IMAGING | Facility: CLINIC | Age: 68
End: 2023-03-04
Payer: COMMERCIAL

## 2023-03-04 ENCOUNTER — OUTPATIENT (OUTPATIENT)
Dept: OUTPATIENT SERVICES | Facility: HOSPITAL | Age: 68
LOS: 1 days | End: 2023-03-04
Payer: COMMERCIAL

## 2023-03-04 DIAGNOSIS — N28.1 CYST OF KIDNEY, ACQUIRED: ICD-10-CM

## 2023-03-04 PROCEDURE — 76775 US EXAM ABDO BACK WALL LIM: CPT | Mod: 26

## 2023-03-04 PROCEDURE — 76775 US EXAM ABDO BACK WALL LIM: CPT

## 2023-04-14 ENCOUNTER — NON-APPOINTMENT (OUTPATIENT)
Age: 68
End: 2023-04-14

## 2023-05-24 ENCOUNTER — TRANSCRIPTION ENCOUNTER (OUTPATIENT)
Age: 68
End: 2023-05-24

## 2023-05-24 PROCEDURE — 93010 ELECTROCARDIOGRAM REPORT: CPT

## 2023-05-25 ENCOUNTER — RESULT REVIEW (OUTPATIENT)
Age: 68
End: 2023-05-25

## 2023-05-25 ENCOUNTER — APPOINTMENT (OUTPATIENT)
Dept: TRANSPLANT | Facility: HOSPITAL | Age: 68
End: 2023-05-25

## 2023-05-25 ENCOUNTER — INPATIENT (INPATIENT)
Facility: HOSPITAL | Age: 68
LOS: 6 days | Discharge: HOME HEALTH | DRG: 652 | End: 2023-06-01
Payer: MEDICAID

## 2023-05-25 VITALS
SYSTOLIC BLOOD PRESSURE: 134 MMHG | OXYGEN SATURATION: 100 % | HEART RATE: 87 BPM | HEIGHT: 63 IN | RESPIRATION RATE: 18 BRPM | TEMPERATURE: 98 F | WEIGHT: 131.62 LBS | DIASTOLIC BLOOD PRESSURE: 74 MMHG

## 2023-05-25 DIAGNOSIS — Z94.0 KIDNEY TRANSPLANT STATUS: ICD-10-CM

## 2023-05-25 DIAGNOSIS — D84.9 IMMUNODEFICIENCY, UNSPECIFIED: ICD-10-CM

## 2023-05-25 LAB
ALBUMIN SERPL ELPH-MCNC: 3.6 G/DL — SIGNIFICANT CHANGE UP (ref 3.3–5)
ALBUMIN SERPL ELPH-MCNC: 4.2 G/DL — SIGNIFICANT CHANGE UP (ref 3.3–5)
ALBUMIN SERPL ELPH-MCNC: 5.1 G/DL — HIGH (ref 3.3–5)
ALP SERPL-CCNC: 116 U/L — SIGNIFICANT CHANGE UP (ref 40–120)
ALP SERPL-CCNC: 129 U/L — HIGH (ref 40–120)
ALP SERPL-CCNC: 167 U/L — HIGH (ref 40–120)
ALT FLD-CCNC: 10 U/L — SIGNIFICANT CHANGE UP (ref 10–45)
ALT FLD-CCNC: 11 U/L — SIGNIFICANT CHANGE UP (ref 10–45)
ALT FLD-CCNC: 12 U/L — SIGNIFICANT CHANGE UP (ref 10–45)
ANION GAP SERPL CALC-SCNC: 16 MMOL/L — SIGNIFICANT CHANGE UP (ref 5–17)
ANION GAP SERPL CALC-SCNC: 17 MMOL/L — SIGNIFICANT CHANGE UP (ref 5–17)
ANION GAP SERPL CALC-SCNC: 22 MMOL/L — HIGH (ref 5–17)
APTT BLD: 32.6 SEC — SIGNIFICANT CHANGE UP (ref 27.5–35.5)
AST SERPL-CCNC: 20 U/L — SIGNIFICANT CHANGE UP (ref 10–40)
AST SERPL-CCNC: 23 U/L — SIGNIFICANT CHANGE UP (ref 10–40)
AST SERPL-CCNC: 28 U/L — SIGNIFICANT CHANGE UP (ref 10–40)
BASE EXCESS BLDV CALC-SCNC: 10.5 MMOL/L — HIGH (ref -2–3)
BASOPHILS # BLD AUTO: 0.03 K/UL — SIGNIFICANT CHANGE UP (ref 0–0.2)
BASOPHILS # BLD AUTO: 0.04 K/UL — SIGNIFICANT CHANGE UP (ref 0–0.2)
BASOPHILS # BLD AUTO: 0.08 K/UL — SIGNIFICANT CHANGE UP (ref 0–0.2)
BASOPHILS NFR BLD AUTO: 0.2 % — SIGNIFICANT CHANGE UP (ref 0–2)
BASOPHILS NFR BLD AUTO: 0.3 % — SIGNIFICANT CHANGE UP (ref 0–2)
BASOPHILS NFR BLD AUTO: 0.8 % — SIGNIFICANT CHANGE UP (ref 0–2)
BILIRUB SERPL-MCNC: 0.3 MG/DL — SIGNIFICANT CHANGE UP (ref 0.2–1.2)
BILIRUB SERPL-MCNC: 0.3 MG/DL — SIGNIFICANT CHANGE UP (ref 0.2–1.2)
BILIRUB SERPL-MCNC: 0.5 MG/DL — SIGNIFICANT CHANGE UP (ref 0.2–1.2)
BLD GP AB SCN SERPL QL: NEGATIVE — SIGNIFICANT CHANGE UP
BUN SERPL-MCNC: 17 MG/DL — SIGNIFICANT CHANGE UP (ref 7–23)
BUN SERPL-MCNC: 20 MG/DL — SIGNIFICANT CHANGE UP (ref 7–23)
BUN SERPL-MCNC: 24 MG/DL — HIGH (ref 7–23)
CA-I SERPL-SCNC: 1.13 MMOL/L — LOW (ref 1.15–1.33)
CALCIUM SERPL-MCNC: 8.2 MG/DL — LOW (ref 8.4–10.5)
CALCIUM SERPL-MCNC: 8.4 MG/DL — SIGNIFICANT CHANGE UP (ref 8.4–10.5)
CALCIUM SERPL-MCNC: 9.9 MG/DL — SIGNIFICANT CHANGE UP (ref 8.4–10.5)
CHLORIDE BLDV-SCNC: 98 MMOL/L — SIGNIFICANT CHANGE UP (ref 96–108)
CHLORIDE SERPL-SCNC: 92 MMOL/L — LOW (ref 96–108)
CHLORIDE SERPL-SCNC: 96 MMOL/L — SIGNIFICANT CHANGE UP (ref 96–108)
CHLORIDE SERPL-SCNC: 97 MMOL/L — SIGNIFICANT CHANGE UP (ref 96–108)
CO2 BLDV-SCNC: 38 MMOL/L — HIGH (ref 22–26)
CO2 SERPL-SCNC: 17 MMOL/L — LOW (ref 22–31)
CO2 SERPL-SCNC: 21 MMOL/L — LOW (ref 22–31)
CO2 SERPL-SCNC: 29 MMOL/L — SIGNIFICANT CHANGE UP (ref 22–31)
CREAT SERPL-MCNC: 4.21 MG/DL — HIGH (ref 0.5–1.3)
CREAT SERPL-MCNC: 4.76 MG/DL — HIGH (ref 0.5–1.3)
CREAT SERPL-MCNC: 4.87 MG/DL — HIGH (ref 0.5–1.3)
EGFR: 12 ML/MIN/1.73M2 — LOW
EGFR: 13 ML/MIN/1.73M2 — LOW
EGFR: 15 ML/MIN/1.73M2 — LOW
EOSINOPHIL # BLD AUTO: 0 K/UL — SIGNIFICANT CHANGE UP (ref 0–0.5)
EOSINOPHIL # BLD AUTO: 0.03 K/UL — SIGNIFICANT CHANGE UP (ref 0–0.5)
EOSINOPHIL # BLD AUTO: 0.27 K/UL — SIGNIFICANT CHANGE UP (ref 0–0.5)
EOSINOPHIL NFR BLD AUTO: 0 % — SIGNIFICANT CHANGE UP (ref 0–6)
EOSINOPHIL NFR BLD AUTO: 0.2 % — SIGNIFICANT CHANGE UP (ref 0–6)
EOSINOPHIL NFR BLD AUTO: 2.7 % — SIGNIFICANT CHANGE UP (ref 0–6)
GAS PNL BLDA: SIGNIFICANT CHANGE UP
GAS PNL BLDV: 134 MMOL/L — LOW (ref 136–145)
GAS PNL BLDV: SIGNIFICANT CHANGE UP
GAS PNL BLDV: SIGNIFICANT CHANGE UP
GLUCOSE BLDC GLUCOMTR-MCNC: 113 MG/DL — HIGH (ref 70–99)
GLUCOSE BLDC GLUCOMTR-MCNC: 122 MG/DL — HIGH (ref 70–99)
GLUCOSE BLDC GLUCOMTR-MCNC: 164 MG/DL — HIGH (ref 70–99)
GLUCOSE BLDC GLUCOMTR-MCNC: 202 MG/DL — HIGH (ref 70–99)
GLUCOSE BLDC GLUCOMTR-MCNC: 204 MG/DL — HIGH (ref 70–99)
GLUCOSE BLDC GLUCOMTR-MCNC: 263 MG/DL — HIGH (ref 70–99)
GLUCOSE BLDV-MCNC: 127 MG/DL — HIGH (ref 70–99)
GLUCOSE SERPL-MCNC: 118 MG/DL — HIGH (ref 70–99)
GLUCOSE SERPL-MCNC: 181 MG/DL — HIGH (ref 70–99)
GLUCOSE SERPL-MCNC: 212 MG/DL — HIGH (ref 70–99)
HBV CORE AB SER-ACNC: SIGNIFICANT CHANGE UP
HBV SURFACE AB SER-ACNC: 40.3 MIU/ML — SIGNIFICANT CHANGE UP
HBV SURFACE AG SER-ACNC: SIGNIFICANT CHANGE UP
HCO3 BLDV-SCNC: 36 MMOL/L — HIGH (ref 22–29)
HCT VFR BLD CALC: 33.1 % — LOW (ref 39–50)
HCT VFR BLD CALC: 35.4 % — LOW (ref 39–50)
HCT VFR BLD CALC: 40.7 % — SIGNIFICANT CHANGE UP (ref 39–50)
HCT VFR BLDA CALC: 35 % — LOW (ref 39–51)
HCV AB S/CO SERPL IA: 0.16 S/CO — SIGNIFICANT CHANGE UP (ref 0–0.99)
HCV AB SERPL-IMP: SIGNIFICANT CHANGE UP
HCV RNA SPEC NAA+PROBE-LOG IU: SIGNIFICANT CHANGE UP
HCV RNA SPEC NAA+PROBE-LOG IU: SIGNIFICANT CHANGE UP LOGIU/ML
HGB BLD CALC-MCNC: 11.5 G/DL — LOW (ref 12.6–17.4)
HGB BLD-MCNC: 10.6 G/DL — LOW (ref 13–17)
HGB BLD-MCNC: 11.2 G/DL — LOW (ref 13–17)
HGB BLD-MCNC: 13.1 G/DL — SIGNIFICANT CHANGE UP (ref 13–17)
HIV 1+2 AB+HIV1 P24 AG SERPL QL IA: SIGNIFICANT CHANGE UP
IMM GRANULOCYTES NFR BLD AUTO: 0.4 % — SIGNIFICANT CHANGE UP (ref 0–0.9)
IMM GRANULOCYTES NFR BLD AUTO: 0.4 % — SIGNIFICANT CHANGE UP (ref 0–0.9)
IMM GRANULOCYTES NFR BLD AUTO: 0.5 % — SIGNIFICANT CHANGE UP (ref 0–0.9)
INR BLD: 1.03 RATIO — SIGNIFICANT CHANGE UP (ref 0.88–1.16)
LACTATE BLDV-MCNC: 2.2 MMOL/L — HIGH (ref 0.5–2)
LYMPHOCYTES # BLD AUTO: 1.39 K/UL — SIGNIFICANT CHANGE UP (ref 1–3.3)
LYMPHOCYTES # BLD AUTO: 17.4 % — SIGNIFICANT CHANGE UP (ref 13–44)
LYMPHOCYTES # BLD AUTO: 2.43 K/UL — SIGNIFICANT CHANGE UP (ref 1–3.3)
LYMPHOCYTES # BLD AUTO: 3.21 K/UL — SIGNIFICANT CHANGE UP (ref 1–3.3)
LYMPHOCYTES # BLD AUTO: 32.5 % — SIGNIFICANT CHANGE UP (ref 13–44)
LYMPHOCYTES # BLD AUTO: 7.8 % — LOW (ref 13–44)
MAGNESIUM SERPL-MCNC: 2.3 MG/DL — SIGNIFICANT CHANGE UP (ref 1.6–2.6)
MAGNESIUM SERPL-MCNC: 2.4 MG/DL — SIGNIFICANT CHANGE UP (ref 1.6–2.6)
MCHC RBC-ENTMCNC: 31.6 GM/DL — LOW (ref 32–36)
MCHC RBC-ENTMCNC: 32 GM/DL — SIGNIFICANT CHANGE UP (ref 32–36)
MCHC RBC-ENTMCNC: 32.2 GM/DL — SIGNIFICANT CHANGE UP (ref 32–36)
MCHC RBC-ENTMCNC: 32.2 PG — SIGNIFICANT CHANGE UP (ref 27–34)
MCHC RBC-ENTMCNC: 32.2 PG — SIGNIFICANT CHANGE UP (ref 27–34)
MCHC RBC-ENTMCNC: 32.4 PG — SIGNIFICANT CHANGE UP (ref 27–34)
MCV RBC AUTO: 100.6 FL — HIGH (ref 80–100)
MCV RBC AUTO: 100.7 FL — HIGH (ref 80–100)
MCV RBC AUTO: 101.7 FL — HIGH (ref 80–100)
MONOCYTES # BLD AUTO: 0.48 K/UL — SIGNIFICANT CHANGE UP (ref 0–0.9)
MONOCYTES # BLD AUTO: 0.9 K/UL — SIGNIFICANT CHANGE UP (ref 0–0.9)
MONOCYTES # BLD AUTO: 1.22 K/UL — HIGH (ref 0–0.9)
MONOCYTES NFR BLD AUTO: 3.4 % — SIGNIFICANT CHANGE UP (ref 2–14)
MONOCYTES NFR BLD AUTO: 6.9 % — SIGNIFICANT CHANGE UP (ref 2–14)
MONOCYTES NFR BLD AUTO: 9.1 % — SIGNIFICANT CHANGE UP (ref 2–14)
NEUTROPHILS # BLD AUTO: 10.89 K/UL — HIGH (ref 1.8–7.4)
NEUTROPHILS # BLD AUTO: 15.07 K/UL — HIGH (ref 1.8–7.4)
NEUTROPHILS # BLD AUTO: 5.38 K/UL — SIGNIFICANT CHANGE UP (ref 1.8–7.4)
NEUTROPHILS NFR BLD AUTO: 54.4 % — SIGNIFICANT CHANGE UP (ref 43–77)
NEUTROPHILS NFR BLD AUTO: 78.3 % — HIGH (ref 43–77)
NEUTROPHILS NFR BLD AUTO: 84.7 % — HIGH (ref 43–77)
NRBC # BLD: 0 /100 WBCS — SIGNIFICANT CHANGE UP (ref 0–0)
PCO2 BLDV: 52 MMHG — SIGNIFICANT CHANGE UP (ref 42–55)
PH BLDV: 7.45 — HIGH (ref 7.32–7.43)
PHOSPHATE SERPL-MCNC: 3.6 MG/DL — SIGNIFICANT CHANGE UP (ref 2.5–4.5)
PHOSPHATE SERPL-MCNC: 4.4 MG/DL — SIGNIFICANT CHANGE UP (ref 2.5–4.5)
PLATELET # BLD AUTO: 367 K/UL — SIGNIFICANT CHANGE UP (ref 150–400)
PLATELET # BLD AUTO: 389 K/UL — SIGNIFICANT CHANGE UP (ref 150–400)
PLATELET # BLD AUTO: 421 K/UL — HIGH (ref 150–400)
PO2 BLDV: 26 MMHG — SIGNIFICANT CHANGE UP (ref 25–45)
POTASSIUM BLDV-SCNC: 4.5 MMOL/L — SIGNIFICANT CHANGE UP (ref 3.5–5.1)
POTASSIUM SERPL-MCNC: 4.3 MMOL/L — SIGNIFICANT CHANGE UP (ref 3.5–5.3)
POTASSIUM SERPL-MCNC: 4.9 MMOL/L — SIGNIFICANT CHANGE UP (ref 3.5–5.3)
POTASSIUM SERPL-MCNC: 5.5 MMOL/L — HIGH (ref 3.5–5.3)
POTASSIUM SERPL-SCNC: 4.3 MMOL/L — SIGNIFICANT CHANGE UP (ref 3.5–5.3)
POTASSIUM SERPL-SCNC: 4.9 MMOL/L — SIGNIFICANT CHANGE UP (ref 3.5–5.3)
POTASSIUM SERPL-SCNC: 5.5 MMOL/L — HIGH (ref 3.5–5.3)
PROT SERPL-MCNC: 6.2 G/DL — SIGNIFICANT CHANGE UP (ref 6–8.3)
PROT SERPL-MCNC: 6.9 G/DL — SIGNIFICANT CHANGE UP (ref 6–8.3)
PROT SERPL-MCNC: 8.8 G/DL — HIGH (ref 6–8.3)
PROTHROM AB SERPL-ACNC: 11.9 SEC — SIGNIFICANT CHANGE UP (ref 10.5–13.4)
RBC # BLD: 3.29 M/UL — LOW (ref 4.2–5.8)
RBC # BLD: 3.48 M/UL — LOW (ref 4.2–5.8)
RBC # BLD: 4.04 M/UL — LOW (ref 4.2–5.8)
RBC # FLD: 16 % — HIGH (ref 10.3–14.5)
RBC # FLD: 16.3 % — HIGH (ref 10.3–14.5)
RBC # FLD: 16.5 % — HIGH (ref 10.3–14.5)
RH IG SCN BLD-IMP: POSITIVE — SIGNIFICANT CHANGE UP
SAO2 % BLDV: 36.8 % — LOW (ref 67–88)
SARS-COV-2 RNA SPEC QL NAA+PROBE: SIGNIFICANT CHANGE UP
SODIUM SERPL-SCNC: 135 MMOL/L — SIGNIFICANT CHANGE UP (ref 135–145)
SODIUM SERPL-SCNC: 135 MMOL/L — SIGNIFICANT CHANGE UP (ref 135–145)
SODIUM SERPL-SCNC: 137 MMOL/L — SIGNIFICANT CHANGE UP (ref 135–145)
WBC # BLD: 13.93 K/UL — HIGH (ref 3.8–10.5)
WBC # BLD: 17.78 K/UL — HIGH (ref 3.8–10.5)
WBC # BLD: 9.89 K/UL — SIGNIFICANT CHANGE UP (ref 3.8–10.5)
WBC # FLD AUTO: 13.93 K/UL — HIGH (ref 3.8–10.5)
WBC # FLD AUTO: 17.78 K/UL — HIGH (ref 3.8–10.5)
WBC # FLD AUTO: 9.89 K/UL — SIGNIFICANT CHANGE UP (ref 3.8–10.5)

## 2023-05-25 PROCEDURE — 93010 ELECTROCARDIOGRAM REPORT: CPT

## 2023-05-25 PROCEDURE — 50360 RNL ALTRNSPLJ W/O RCP NFRCT: CPT

## 2023-05-25 PROCEDURE — 76776 US EXAM K TRANSPL W/DOPPLER: CPT | Mod: 26,RT

## 2023-05-25 PROCEDURE — 71045 X-RAY EXAM CHEST 1 VIEW: CPT | Mod: 26

## 2023-05-25 PROCEDURE — 88305 TISSUE EXAM BY PATHOLOGIST: CPT | Mod: 26

## 2023-05-25 PROCEDURE — 76998 US GUIDE INTRAOP: CPT | Mod: 26

## 2023-05-25 PROCEDURE — 88313 SPECIAL STAINS GROUP 2: CPT | Mod: 26

## 2023-05-25 PROCEDURE — 50605 INSERT URETERAL SUPPORT: CPT

## 2023-05-25 PROCEDURE — 50205 RENAL BX SURG EXPOSURE KDN: CPT

## 2023-05-25 PROCEDURE — 99223 1ST HOSP IP/OBS HIGH 75: CPT | Mod: GC

## 2023-05-25 DEVICE — IMPLANTABLE DEVICE: Type: IMPLANTABLE DEVICE | Site: RIGHT | Status: FUNCTIONAL

## 2023-05-25 DEVICE — KIT A-LINE 1LUM 20G X 12CM SAFE KIT: Type: IMPLANTABLE DEVICE | Site: RIGHT | Status: FUNCTIONAL

## 2023-05-25 RX ORDER — SODIUM BICARBONATE 1 MEQ/ML
50 SYRINGE (ML) INTRAVENOUS ONCE
Refills: 0 | Status: COMPLETED | OUTPATIENT
Start: 2023-05-25 | End: 2023-05-25

## 2023-05-25 RX ORDER — VALGANCICLOVIR 450 MG/1
450 TABLET, FILM COATED ORAL DAILY
Refills: 0 | Status: DISCONTINUED | OUTPATIENT
Start: 2023-05-26 | End: 2023-05-26

## 2023-05-25 RX ORDER — INSULIN LISPRO 100/ML
VIAL (ML) SUBCUTANEOUS EVERY 6 HOURS
Refills: 0 | Status: DISCONTINUED | OUTPATIENT
Start: 2023-05-25 | End: 2023-05-25

## 2023-05-25 RX ORDER — ONDANSETRON 8 MG/1
4 TABLET, FILM COATED ORAL EVERY 6 HOURS
Refills: 0 | Status: DISCONTINUED | OUTPATIENT
Start: 2023-05-25 | End: 2023-06-01

## 2023-05-25 RX ORDER — INSULIN HUMAN 100 [IU]/ML
5 INJECTION, SOLUTION SUBCUTANEOUS ONCE
Refills: 0 | Status: COMPLETED | OUTPATIENT
Start: 2023-05-25 | End: 2023-05-25

## 2023-05-25 RX ORDER — ONDANSETRON 8 MG/1
4 TABLET, FILM COATED ORAL ONCE
Refills: 0 | Status: DISCONTINUED | OUTPATIENT
Start: 2023-05-25 | End: 2023-05-25

## 2023-05-25 RX ORDER — SODIUM CHLORIDE 9 MG/ML
1000 INJECTION, SOLUTION INTRAVENOUS
Refills: 0 | Status: DISCONTINUED | OUTPATIENT
Start: 2023-05-25 | End: 2023-06-01

## 2023-05-25 RX ORDER — INSULIN HUMAN 100 [IU]/ML
10 INJECTION, SOLUTION SUBCUTANEOUS ONCE
Refills: 0 | Status: DISCONTINUED | OUTPATIENT
Start: 2023-05-25 | End: 2023-05-25

## 2023-05-25 RX ORDER — ACETAMINOPHEN 500 MG
1000 TABLET ORAL ONCE
Refills: 0 | Status: COMPLETED | OUTPATIENT
Start: 2023-05-25 | End: 2023-05-25

## 2023-05-25 RX ORDER — SODIUM ZIRCONIUM CYCLOSILICATE 10 G/10G
10 POWDER, FOR SUSPENSION ORAL ONCE
Refills: 0 | Status: COMPLETED | OUTPATIENT
Start: 2023-05-25 | End: 2023-05-25

## 2023-05-25 RX ORDER — DEXTROSE 50 % IN WATER 50 %
50 SYRINGE (ML) INTRAVENOUS ONCE
Refills: 0 | Status: COMPLETED | OUTPATIENT
Start: 2023-05-25 | End: 2023-05-25

## 2023-05-25 RX ORDER — POLYETHYLENE GLYCOL 3350 17 G/17G
17 POWDER, FOR SOLUTION ORAL DAILY
Refills: 0 | Status: DISCONTINUED | OUTPATIENT
Start: 2023-05-26 | End: 2023-06-01

## 2023-05-25 RX ORDER — HYDROMORPHONE HYDROCHLORIDE 2 MG/ML
1 INJECTION INTRAMUSCULAR; INTRAVENOUS; SUBCUTANEOUS
Refills: 0 | Status: DISCONTINUED | OUTPATIENT
Start: 2023-05-25 | End: 2023-05-25

## 2023-05-25 RX ORDER — BASILIXIMAB 20 MG/5ML
20 INJECTION, POWDER, FOR SOLUTION INTRAVENOUS ONCE
Refills: 0 | Status: COMPLETED | OUTPATIENT
Start: 2023-05-29 | End: 2023-05-29

## 2023-05-25 RX ORDER — DEXTROSE 50 % IN WATER 50 %
25 SYRINGE (ML) INTRAVENOUS ONCE
Refills: 0 | Status: DISCONTINUED | OUTPATIENT
Start: 2023-05-25 | End: 2023-05-25

## 2023-05-25 RX ORDER — TACROLIMUS 5 MG/1
5 CAPSULE ORAL
Refills: 0 | Status: DISCONTINUED | OUTPATIENT
Start: 2023-05-26 | End: 2023-05-26

## 2023-05-25 RX ORDER — INSULIN LISPRO 100/ML
VIAL (ML) SUBCUTANEOUS
Refills: 0 | Status: DISCONTINUED | OUTPATIENT
Start: 2023-05-25 | End: 2023-05-30

## 2023-05-25 RX ORDER — CEFAZOLIN SODIUM 1 G
2000 VIAL (EA) INJECTION ONCE
Refills: 0 | Status: COMPLETED | OUTPATIENT
Start: 2023-05-25 | End: 2023-05-25

## 2023-05-25 RX ORDER — DEXTROSE 50 % IN WATER 50 %
25 SYRINGE (ML) INTRAVENOUS ONCE
Refills: 0 | Status: DISCONTINUED | OUTPATIENT
Start: 2023-05-25 | End: 2023-06-01

## 2023-05-25 RX ORDER — FAMOTIDINE 10 MG/ML
20 INJECTION INTRAVENOUS DAILY
Refills: 0 | Status: DISCONTINUED | OUTPATIENT
Start: 2023-05-26 | End: 2023-06-01

## 2023-05-25 RX ORDER — GABAPENTIN 400 MG/1
100 CAPSULE ORAL
Refills: 0 | Status: DISCONTINUED | OUTPATIENT
Start: 2023-05-26 | End: 2023-06-01

## 2023-05-25 RX ORDER — MYCOPHENOLATE MOFETIL 250 MG/1
1 CAPSULE ORAL
Refills: 0 | Status: DISCONTINUED | OUTPATIENT
Start: 2023-05-25 | End: 2023-06-01

## 2023-05-25 RX ORDER — SODIUM CHLORIDE 9 MG/ML
1000 INJECTION INTRAMUSCULAR; INTRAVENOUS; SUBCUTANEOUS
Refills: 0 | Status: DISCONTINUED | OUTPATIENT
Start: 2023-05-25 | End: 2023-05-26

## 2023-05-25 RX ORDER — SODIUM CHLORIDE 9 MG/ML
1000 INJECTION, SOLUTION INTRAVENOUS
Refills: 0 | Status: DISCONTINUED | OUTPATIENT
Start: 2023-05-25 | End: 2023-05-25

## 2023-05-25 RX ORDER — SENNA PLUS 8.6 MG/1
2 TABLET ORAL AT BEDTIME
Refills: 0 | Status: DISCONTINUED | OUTPATIENT
Start: 2023-05-25 | End: 2023-06-01

## 2023-05-25 RX ORDER — GLUCAGON INJECTION, SOLUTION 0.5 MG/.1ML
1 INJECTION, SOLUTION SUBCUTANEOUS ONCE
Refills: 0 | Status: DISCONTINUED | OUTPATIENT
Start: 2023-05-25 | End: 2023-06-01

## 2023-05-25 RX ORDER — GLUCAGON INJECTION, SOLUTION 0.5 MG/.1ML
1 INJECTION, SOLUTION SUBCUTANEOUS ONCE
Refills: 0 | Status: DISCONTINUED | OUTPATIENT
Start: 2023-05-25 | End: 2023-05-25

## 2023-05-25 RX ORDER — DEXTROSE 50 % IN WATER 50 %
12.5 SYRINGE (ML) INTRAVENOUS ONCE
Refills: 0 | Status: DISCONTINUED | OUTPATIENT
Start: 2023-05-25 | End: 2023-06-01

## 2023-05-25 RX ORDER — HYDROMORPHONE HYDROCHLORIDE 2 MG/ML
0.5 INJECTION INTRAMUSCULAR; INTRAVENOUS; SUBCUTANEOUS
Refills: 0 | Status: DISCONTINUED | OUTPATIENT
Start: 2023-05-25 | End: 2023-05-25

## 2023-05-25 RX ORDER — DEXTROSE 50 % IN WATER 50 %
12.5 SYRINGE (ML) INTRAVENOUS ONCE
Refills: 0 | Status: DISCONTINUED | OUTPATIENT
Start: 2023-05-25 | End: 2023-05-25

## 2023-05-25 RX ORDER — INSULIN LISPRO 100/ML
VIAL (ML) SUBCUTANEOUS AT BEDTIME
Refills: 0 | Status: DISCONTINUED | OUTPATIENT
Start: 2023-05-25 | End: 2023-05-30

## 2023-05-25 RX ORDER — NYSTATIN 500MM UNIT
500000 POWDER (EA) MISCELLANEOUS
Refills: 0 | Status: DISCONTINUED | OUTPATIENT
Start: 2023-05-26 | End: 2023-06-01

## 2023-05-25 RX ORDER — BASILIXIMAB 20 MG/5ML
20 INJECTION, POWDER, FOR SOLUTION INTRAVENOUS ONCE
Refills: 0 | Status: DISCONTINUED | OUTPATIENT
Start: 2023-05-25 | End: 2023-05-29

## 2023-05-25 RX ORDER — DEXTROSE 50 % IN WATER 50 %
15 SYRINGE (ML) INTRAVENOUS ONCE
Refills: 0 | Status: DISCONTINUED | OUTPATIENT
Start: 2023-05-25 | End: 2023-06-01

## 2023-05-25 RX ORDER — TACROLIMUS 5 MG/1
3 CAPSULE ORAL ONCE
Refills: 0 | Status: COMPLETED | OUTPATIENT
Start: 2023-05-25 | End: 2023-05-25

## 2023-05-25 RX ORDER — DEXTROSE 50 % IN WATER 50 %
15 SYRINGE (ML) INTRAVENOUS ONCE
Refills: 0 | Status: DISCONTINUED | OUTPATIENT
Start: 2023-05-25 | End: 2023-05-25

## 2023-05-25 RX ADMIN — HYDROMORPHONE HYDROCHLORIDE 0.5 MILLIGRAM(S): 2 INJECTION INTRAMUSCULAR; INTRAVENOUS; SUBCUTANEOUS at 21:00

## 2023-05-25 RX ADMIN — HYDROMORPHONE HYDROCHLORIDE 0.5 MILLIGRAM(S): 2 INJECTION INTRAMUSCULAR; INTRAVENOUS; SUBCUTANEOUS at 16:05

## 2023-05-25 RX ADMIN — Medication 50 MILLILITER(S): at 21:33

## 2023-05-25 RX ADMIN — SODIUM CHLORIDE 70 MILLILITER(S): 9 INJECTION INTRAMUSCULAR; INTRAVENOUS; SUBCUTANEOUS at 20:02

## 2023-05-25 RX ADMIN — HYDROMORPHONE HYDROCHLORIDE 0.5 MILLIGRAM(S): 2 INJECTION INTRAMUSCULAR; INTRAVENOUS; SUBCUTANEOUS at 16:40

## 2023-05-25 RX ADMIN — Medication 2: at 22:17

## 2023-05-25 RX ADMIN — MYCOPHENOLATE MOFETIL 1 GRAM(S): 250 CAPSULE ORAL at 20:02

## 2023-05-25 RX ADMIN — HYDROMORPHONE HYDROCHLORIDE 0.5 MILLIGRAM(S): 2 INJECTION INTRAMUSCULAR; INTRAVENOUS; SUBCUTANEOUS at 20:45

## 2023-05-25 RX ADMIN — Medication 1000 MILLIGRAM(S): at 18:30

## 2023-05-25 RX ADMIN — SODIUM CHLORIDE 70 MILLILITER(S): 9 INJECTION INTRAMUSCULAR; INTRAVENOUS; SUBCUTANEOUS at 17:11

## 2023-05-25 RX ADMIN — HYDROMORPHONE HYDROCHLORIDE 0.5 MILLIGRAM(S): 2 INJECTION INTRAMUSCULAR; INTRAVENOUS; SUBCUTANEOUS at 17:30

## 2023-05-25 RX ADMIN — HYDROMORPHONE HYDROCHLORIDE 0.5 MILLIGRAM(S): 2 INJECTION INTRAMUSCULAR; INTRAVENOUS; SUBCUTANEOUS at 15:50

## 2023-05-25 RX ADMIN — TACROLIMUS 3 MILLIGRAM(S): 5 CAPSULE ORAL at 17:11

## 2023-05-25 RX ADMIN — Medication 50 MILLIEQUIVALENT(S): at 21:33

## 2023-05-25 RX ADMIN — Medication 4: at 18:36

## 2023-05-25 RX ADMIN — HYDROMORPHONE HYDROCHLORIDE 0.5 MILLIGRAM(S): 2 INJECTION INTRAMUSCULAR; INTRAVENOUS; SUBCUTANEOUS at 16:25

## 2023-05-25 RX ADMIN — HYDROMORPHONE HYDROCHLORIDE 0.5 MILLIGRAM(S): 2 INJECTION INTRAMUSCULAR; INTRAVENOUS; SUBCUTANEOUS at 17:15

## 2023-05-25 RX ADMIN — INSULIN HUMAN 5 UNIT(S): 100 INJECTION, SOLUTION SUBCUTANEOUS at 21:33

## 2023-05-25 RX ADMIN — SENNA PLUS 2 TABLET(S): 8.6 TABLET ORAL at 21:01

## 2023-05-25 RX ADMIN — Medication 400 MILLIGRAM(S): at 18:00

## 2023-05-25 RX ADMIN — SODIUM ZIRCONIUM CYCLOSILICATE 10 GRAM(S): 10 POWDER, FOR SUSPENSION ORAL at 22:11

## 2023-05-25 NOTE — BRIEF OPERATIVE NOTE - COMMENTS
Referring MD: Mohsen Pahlavan
Referring MD: Mohsen Pahlavan (I personally notified)  Dictation# [id number]

## 2023-05-25 NOTE — PRE-ANESTHESIA EVALUATION ADULT - NSANTHPEFT_GEN_ALL_CORE
Gen: No acute distress  Pulm: breathing comfortably on room air  Cor: regular rate  Ext: Left fistula

## 2023-05-25 NOTE — CONSULT NOTE ADULT - SUBJECTIVE AND OBJECTIVE BOX
[facility] DIVISION OF KIDNEY DISEASES AND HYPERTENSION -- INITIAL CONSULT NOTE  --------------------------------------------------------------------------------    HPI: 69 yo M with h/o ESRD on HD, DM, HTN, and anemia presenting for kidney transplant.     Pt. was seen and evaluated at bedside this morning prior to DDRT. Pt. Bhumi speaking. He reported feeling well, endorsed no complaints. Outpatient nephrologist is Dr. Yoder. ESRD attributed to DM. Has been on HD since 12/2021. Last HD treatment was on 5/24. He denied any headaches, fevers/chills, chest pain, SOB, and LE edema.    Pt. currently in OR for DDRT under Simulect induction.     Donor information: Male in 70's, KDPI= 96%        PAST HISTORY  --------------------------------------------------------------------------------  PAST MEDICAL & SURGICAL HISTORY:  DM (diabetes mellitus)  HTN (hypertension)  Chronic kidney disease, unspecified CKD stage  ESRD on dialysis  Pneumonia  Sepsis  Anemia    No significant past surgical history        FAMILY HISTORY:  No pertinent family history in first degree relatives      Social History:  Functional status: can walk 2 blocks without stopping , can climb 1-2 flights of stairs.   Social history: lives with his wife, daughter and grandchildren. ex smoker ( used to smoke for 40 years) stopped 3 years ago. No alcohol or illicit drug use. (25 May 2023 02:21)        ALLERGIES & MEDICATIONS  --------------------------------------------------------------------------------  Allergies    No Known Allergies    Intolerances      Standing Inpatient Medications  basiliximab  IVPB 20 milliGRAM(s) IV Intermittent once    PRN Inpatient Medications      REVIEW OF SYSTEMS  --------------------------------------------------------------------------------  See HPI      VITALS/PHYSICAL EXAM  --------------------------------------------------------------------------------  T(C): 36.7 (05-25-23 @ 12:12), Max: 37.1 (05-25-23 @ 09:00)  HR: 74 (05-25-23 @ 12:12) (69 - 87)  BP: 149/77 (05-25-23 @ 12:12) (134/74 - 149/77)  RR: 16 (05-25-23 @ 12:12) (16 - 18)  SpO2: 99% (05-25-23 @ 12:12) (99% - 100%)  Wt(kg): --  Height (cm): 160 (05-25-23 @ 12:12)  Weight (kg): 59.7 (05-25-23 @ 12:12)  BMI (kg/m2): 23.3 (05-25-23 @ 12:12)  BSA (m2): 1.62 (05-25-23 @ 12:12)      05-24-23 @ 07:01  -  05-25-23 @ 07:00  --------------------------------------------------------  IN: 0 mL / OUT: 0 mL / NET: 0 mL      Physical Exam:  Gen: Not in distress, well-appearing  HEENT: MMM  Pulm: normal respiratory effort, lungs clear to auscultation bilaterally   CV: regular rate and rhythm, S1 and S2 normal, no murmur   Abd: normoactive bowel sounds, soft and non distended abdomen. No tenderness, guarding or rigidity   : No suprapubic tenderness  Back: No spinal or CVA tenderness; no pre sacral edema  Extremities: No edema. LUE AVF with +thrill and bruit  Skin: warm, no rash, no cyanosis  Neuro: Alert and oriented to person, place and time. Normal speech. Normal affect.       LABS/STUDIES  --------------------------------------------------------------------------------              13.1   9.89  >-----------<  421      [05-25-23 @ 05:10]              40.7     137  |  92  |  17  ----------------------------<  118      [05-25-23 @ 05:10]  4.3   |  29  |  4.21        Ca     9.9     [05-25-23 @ 05:10]    TPro  8.8  /  Alb  5.1  /  TBili  0.5  /  DBili  x   /  AST  20  /  ALT  11  /  AlkPhos  167  [05-25-23 @ 05:10]    PT/INR: PT 11.9 , INR 1.03       [05-25-23 @ 05:10]  PTT: 32.6       [05-25-23 @ 05:10]      Creatinine Trend:  SCr 4.21 [05-25 @ 05:10]    Urinalysis - [02-17-22 @ 11:32]      Color Yellow / Appearance Clear / SG 1.010 / pH 6.0      Gluc 50 / Ketone Negative  / Bili Negative / Urobili Negative       Blood Trace / Protein 100 / Leuk Est Trace / Nitrite Negative      RBC 0-2 / WBC 11-25 / Hyaline  / Gran  / Sq Epi  / Non Sq Epi Occasional / Bacteria Few    HBsAb 224.9      [02-17-22 @ 19:24]  HBsAb Reactive      [02-17-22 @ 19:24]  HBsAg Nonreact      [05-25-23 @ 05:10]  HBcAb Nonreact      [02-17-22 @ 19:24]  HCV 0.14, Nonreact      [02-17-22 @ 19:24]  HIV Nonreact      [05-25-23 @ 05:10]    Tacrolimus  Cyclosporine  Sirolimus  Mycophenolate  BK PCR  CMV PCR  Parvo PCR  EBV PCR

## 2023-05-25 NOTE — PRE-ANESTHESIA EVALUATION ADULT - NSRADCARDRESULTSFT_GEN_ALL_CORE
< from: TTE Echo Complete w/o Contrast w/ Doppler (02.19.22 @ 07:49) >    IMPRESSION:  Left ventricle was of normal size, normal LV systolic function EF 65%  Aortic sclerosis    < end of copied text >

## 2023-05-25 NOTE — H&P ADULT - NSHPSOCIALHISTORY_GEN_ALL_CORE
Functional status: can walk 2 blocks without stopping , can climb 1-2 flights of stairs.   Social history: lives with his wife, daughter and grandchildren. ex smoker ( used to smoke for 40 years) stopped 3 years ago. No alcohol or illicit drug use.

## 2023-05-25 NOTE — CONSULT NOTE ADULT - ASSESSMENT
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 Patient last received HD Wednesday 5/24. He makes a very small amount of urine. 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 Patient last received HD Wednesday 5/24. He makes a very small amount of urine. He is now s/p DDRT w/ simulect induction. Single artery, double vein, single ureter.

## 2023-05-25 NOTE — PACU DISCHARGE NOTE - COMMENTS
Patient is calling to advise that a request for release of information from Yomi Smith  continuous pulse oximetry; no anesthesia cxs noted at this time

## 2023-05-25 NOTE — H&P ADULT - HISTORY OF PRESENT ILLNESS
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 Patient last received HD Wednesday 5/24. He makes a very small amount of urine.

## 2023-05-25 NOTE — CONSULT NOTE ADULT - SUBJECTIVE AND OBJECTIVE BOX
Patient is a 68y Male whom presented to the hospital with esrd on hd    PAST MEDICAL & SURGICAL HISTORY:  DM (diabetes mellitus)      HTN (hypertension)      Chronic kidney disease, unspecified CKD stage      ESRD on dialysis      Pneumonia      Sepsis      Anemia      No significant past surgical history          MEDICATIONS  (STANDING):  basiliximab  IVPB 20 milliGRAM(s) IV Intermittent once  calcitriol   Capsule 0.25 MICROGram(s) Oral daily  chlorhexidine 2% Cloths 1 Application(s) Topical daily  dextrose 5%. 1000 milliLiter(s) (100 mL/Hr) IV Continuous <Continuous>  dextrose 5%. 1000 milliLiter(s) (50 mL/Hr) IV Continuous <Continuous>  dextrose 50% Injectable 25 Gram(s) IV Push once  dextrose 50% Injectable 12.5 Gram(s) IV Push once  dextrose 50% Injectable 25 Gram(s) IV Push once  famotidine    Tablet 20 milliGRAM(s) Oral daily  gabapentin 100 milliGRAM(s) Oral two times a day  glucagon  Injectable 1 milliGRAM(s) IntraMuscular once  insulin lispro (ADMELOG) corrective regimen sliding scale   SubCutaneous three times a day before meals  insulin lispro (ADMELOG) corrective regimen sliding scale   SubCutaneous at bedtime  methylPREDNISolone sodium succinate Injectable   IV Push   mycophenolate mofetil 1 Gram(s) Oral <User Schedule>  NIFEdipine XL 30 milliGRAM(s) Oral daily  nystatin    Suspension 415411 Unit(s) Swish and Swallow four times a day  polyethylene glycol 3350 17 Gram(s) Oral daily  repaglinide 1 milliGRAM(s) Oral three times a day before meals  senna 2 Tablet(s) Oral at bedtime  trimethoprim   80 mG/sulfamethoxazole 400 mG 1 Tablet(s) Oral daily  valGANciclovir 450 milliGRAM(s) Oral <User Schedule>      Allergies    No Known Allergies    Intolerances        SOCIAL HISTORY:  Denies ETOh,Smoking,     FAMILY HISTORY:  No pertinent family history in first degree relatives        REVIEW OF SYSTEMS:    CONSTITUTIONAL: No weakness, fevers or chills  EYES/ENT: No visual changes;  no throat pain   NECK: No pain or stiffness  RESPIRATORY: No cough, wheezing, hemoptysis; No shortness of breath  CARDIOVASCULAR: No chest pain or palpitations  GASTROINTESTINAL: No abdominal or epigastric pain. No nausea, vomiting,     No diarrhea or constipation. No melena   GENITOURINARY: No dysuria, frequency or hematuria  NEUROLOGICAL: No numbness or weakness  SKIN: dry      VITAL:  T(C): , Max: 37.1 (05-26-23 @ 13:00)  T(F): , Max: 98.8 (05-26-23 @ 13:00)  HR: 105 (05-26-23 @ 13:00)  BP: 140/64 (05-26-23 @ 13:00)  BP(mean): 92 (05-26-23 @ 13:00)  RR: 16 (05-26-23 @ 13:00)  SpO2: 98% (05-26-23 @ 13:00)  Wt(kg): --    I and O's:    05-25 @ 07:01  -  05-26 @ 07:00  --------------------------------------------------------  IN: 1340 mL / OUT: 371.5 mL / NET: 968.5 mL    05-26 @ 07:01  -  05-26 @ 20:28  --------------------------------------------------------  IN: 910 mL / OUT: 390 mL / NET: 520 mL          PHYSICAL EXAM:    Constitutional: NAD  HEENT: conjunctive   clear   Neck:  No JVD  Respiratory: CTAB  Cardiovascular: S1 and S2  Gastrointestinal: BS+, soft, NT/ND  Extremities: No peripheral edema  Neurological: A/O x 3, no focal deficits  Psychiatric: Normal mood, normal affect  : pos  [place]  Skin: No rashes  Access: Not applicable            Vital Signs Last 24 Hrs  T(C): 36.7 (26 May 2023 09:00), Max: 36.7 (25 May 2023 10:18)  T(F): 98.1 (26 May 2023 09:00), Max: 98.1 (25 May 2023 10:18)  HR: 94 (26 May 2023 09:00) (74 - 103)  BP: 174/77 (26 May 2023 09:00) (111/58 - 174/77)  BP(mean): 111 (26 May 2023 09:00) (77 - 115)  RR: 16 (26 May 2023 05:00) (16 - 18)  SpO2: 100% (26 May 2023 09:00) (98% - 100%)    Parameters below as of 26 May 2023 05:00  Patient On (Oxygen Delivery Method): room air        I&O's Summary    25 May 2023 07:01  -  26 May 2023 07:00  --------------------------------------------------------  IN: 1340 mL / OUT: 371.5 mL / NET: 968.5 mL    26 May 2023 07:01  -  26 May 2023 09:57  --------------------------------------------------------  IN: 70 mL / OUT: 25 mL / NET: 45 mL                              10.1   14.16 )-----------( 348      ( 26 May 2023 06:38 )             32.5     05-26    138  |  101  |  28<H>  ----------------------------<  166<H>  5.2   |  20<L>  |  4.81<H>    Ca    7.4<L>      26 May 2023 06:37  Phos  2.8     05-26  Mg     2.1     05-26    TPro  5.9<L>  /  Alb  3.3  /  TBili  0.3  /  DBili  x   /  AST  31  /  ALT  10  /  AlkPhos  104  05-26    Tacrolimus (), Serum: 6.1 ng/mL (05-26 @ 06:34)

## 2023-05-25 NOTE — PROVIDER CONTACT NOTE (CRITICAL VALUE NOTIFICATION) - BACKGROUND
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 Patient last received HD Wednesday 5/24. He makes a very small amount of urine.

## 2023-05-25 NOTE — H&P ADULT - NSHPPHYSICALEXAM_GEN_ALL_CORE
General: well developed, well nourished, NAD  Neuro: alert and oriented, no focal deficits, moves all extremities spontaneously  HEENT: NCAT, EOMI, anicteric, mucosa moist  Respiratory: airway patent, respirations unlabored  CVS: regular rate and rhythm  Abdomen: soft, nontender, nondistended  Extremities: no edema, sensation and movement grossly intact, LUE AFT w/palpable thrill, palpable DP b/l  Skin: warm, dry, appropriate color

## 2023-05-25 NOTE — CONSULT NOTE ADULT - PROBLEM SELECTOR RECOMMENDATION 9
Pt. with h/o ESRD attributed to DM nephropathy, on HD (Last treatment: 5/24). Currently in OR for DDRT under Simulect induction. Donor information: Male in 70's, KDPI= 96%. Labs reviewed. Pt. clinically stable. Will monitor for HD needs post-transplant. Pt. to receive tacrolimus and start steroid taper following transplant. Check tacrolimus trough 30 minutes prior to AM dose, goal: 8-10. Monitor labs and urine output. Avoid nephrotoxins.    If you have any questions, please feel free to contact me  Lala Delarosa  Nephrology Fellow  [phone] / [employer] Teams(Preferred)  (After 5pm or on weekends please page the on-call fellow)

## 2023-05-25 NOTE — BRIEF OPERATIVE NOTE - OPERATION/FINDINGS
Donor GLORIA [id number]  male in his 70s  The right kidney was implanted.  Single artery, double vein, single ureter  The right renal vein was reconstructed with donor IVC in an end-to-end fashion with running 6-0 Prolene sutures  KDPI 96%  Terminal creatinine 0.8  cPRA 3%  Simulect induction  Donor; blood type B, CMV-, EBV+  Recipient: blood type B, CMV+, EBV+  Cold ischemia time: 23hs 16mins  Warm ischemia time: 43 mins  Weight of the kidney: 309 grams

## 2023-05-25 NOTE — H&P ADULT - ATTENDING COMMENTS
Presents to undergo a  donor kidney transplant.  I had previously discussed his case with Dr. ABDIEL Garcia and the donor with Dr. MARQUISE Garcia.  Patient is aware that:  - the donor has a KDPI of 96%  - the donor was a male in his 70s  - we expect the kidney to last approximately 3 years  - the higher the KDPI percentage, the most likely it is that the kidney will not work well  - he may need dialysis after the transplant  - there is a possibility that the kidney will not work at all.  Recipient agrees to proceed. I personally saw and examined patient.  Daughter Kaitlin and son in law Mieshati also present.  Daughter asked to translate rather than  (signed consent as well).  Presents to undergo a  donor kidney transplant.  I had previously discussed his case with Dr. ABDIEL Garcia and the donor with Dr. MARQUISE Garcia.  Patient and family are aware that:  - the donor has a KDPI of 96%  - the donor was a male in his 70s  - we expect the kidney to last approximately 3 years  - the higher the KDPI percentage, the most likely it is that the kidney will not work well  - he may need dialysis after the transplant  - there is a possibility that the kidney will not work at all.  Recipient agrees to proceed.  I personally marked the right lower quadrant surgical site as well as the DP sites

## 2023-05-25 NOTE — H&P ADULT - NSHPLABSRESULTS_GEN_ALL_CORE
Lactate Trend          CAPILLARY BLOOD GLUCOSE      POCT Blood Glucose.: 122 mg/dL (25 May 2023 04:30)

## 2023-05-25 NOTE — BRIEF OPERATIVE NOTE - NSICDXBRIEFPROCEDURE_GEN_ALL_CORE_FT
PROCEDURES:  Transplant, kidney,  donor 25-May-2023 15:19:50 1-  donor right kidney transplant to right external iliac vessels  2- Preparation of kidney in back table  3- Reconstruction of right renal vein with donor IVC  4- Post-perfusion biopsy  5- Placement of ureteral stent  6- Intra-operative ultrasound  7- Transpositon of right external iliac vessels Keyur Wade P

## 2023-05-25 NOTE — H&P ADULT - ASSESSMENT
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     - NPO for OR  - Pre-op labs, EKG, CXR  - IS: simulect, solumedrol on call to OR  - Periop abx: ancef    Discussed with Dr. Wade.

## 2023-05-25 NOTE — PATIENT PROFILE ADULT - FALL HARM RISK - UNIVERSAL INTERVENTIONS
Bed in lowest position, wheels locked, appropriate side rails in place/Call bell, personal items and telephone in reach/Instruct patient to call for assistance before getting out of bed or chair/Non-slip footwear when patient is out of bed/[place] to call system/Physically safe environment - no spills, clutter or unnecessary equipment/Purposeful Proactive Rounding/Room/bathroom lighting operational, light cord in reach

## 2023-05-25 NOTE — PROGRESS NOTE ADULT - ASSESSMENT
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 Patient last received HD Wednesday 5/24. He makes a very small amount of urine. He is now s/p DDRT w/ simulect induction. Single artery, double vein, single ureter.    -Continue maintenance/replacement IVF  -Tac 3 given once today. Env 3 to start in AM. MMF 1g BID, methylpred taper. Simulect induction, 2nd dose on POD4  -ppx: nystatin/bactim/valcyte/pepcid  -repeat labs due in 1 hr  -will FU final US read  -SCDs  -Strict hourly I/Os  -NPO

## 2023-05-25 NOTE — PRE-OP CHECKLIST - ADVANCE DIRECTIVE ADDRESSED/READDRESSED
Left and Right cardiac cath scheduled for 10/20/17.  Patient aware of cardiac cath instructions.   done

## 2023-05-25 NOTE — H&P ADULT - NSHPLANGLIMITEDENGLISH_GEN_A_CORE
Group Topic: BH Process Group     Date: 11/2/2020  Start Time: 1030  End Time: 1115  Facilitators: Debora Magill, MSW    Focus: Processing of Thoughts and Feelings  Number in attendance: 5    Pt was recruited for group but did not attend. Efforts to encourage participation in programming on the unit will continue.  Debora B Magill, MSW           Yes

## 2023-05-25 NOTE — PROGRESS NOTE ADULT - SUBJECTIVE AND OBJECTIVE BOX
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 Patient last received HD Wednesday 5/24. He makes a very small amount of urine. He is now s/p DDRT w/ simulect induction. Single artery, double vein, single ureter      Donor   male in his 70s  The right kidney was implanted.  Single artery, double vein, single ureter  The right renal vein was reconstructed with donor IVC in an end-to-end fashion with running 6-0 Prolene sutures  KDPI 96%  Terminal creatinine 0.8  cPRA 3%  Simulect induction  Donor; blood type B, CMV-, EBV+  Recipient: blood type B, CMV+, EBV+  Cold ischemia time: 23hs 16mins  Warm ischemia time: 43 mins  Weight of the kidney: 309 grams    Interval Events:  -seen in PACU  -vitals stable  -UOP 5-10cc.  -2 JPs w/ SS output.   -Renal US done, read pending    Immunosuppression:  -Tac 3 given once today.  -Env 3 to start in AM  -MMF 1g BID  -methylpred taper  -Simulect induction. 2nd dose POD4      Potential Discharge date: pending clinical improvement     Education:  Medications    Plan of care:  See Below    MEDICATIONS  (STANDING):  basiliximab  IVPB 20 milliGRAM(s) IV Intermittent once  dextrose 5%. 1000 milliLiter(s) (50 mL/Hr) IV Continuous <Continuous>  dextrose 5%. 1000 milliLiter(s) (100 mL/Hr) IV Continuous <Continuous>  dextrose 50% Injectable 25 Gram(s) IV Push once  dextrose 50% Injectable 12.5 Gram(s) IV Push once  dextrose 50% Injectable 25 Gram(s) IV Push once  glucagon  Injectable 1 milliGRAM(s) IntraMuscular once  insulin lispro (ADMELOG) corrective regimen sliding scale   SubCutaneous three times a day before meals  insulin lispro (ADMELOG) corrective regimen sliding scale   SubCutaneous at bedtime  mycophenolate mofetil 1 Gram(s) Oral <User Schedule>  senna 2 Tablet(s) Oral at bedtime  sodium chloride 0.9%. 1000 milliLiter(s) (50 mL/Hr) IV Continuous <Continuous>  sodium chloride 0.9%. 1000 milliLiter(s) (70 mL/Hr) IV Continuous <Continuous>    MEDICATIONS  (PRN):  dextrose Oral Gel 15 Gram(s) Oral once PRN Blood Glucose LESS THAN 70 milliGRAM(s)/deciliter  HYDROmorphone  Injectable 0.5 milliGRAM(s) IV Push every 10 minutes PRN Moderate Pain (4 - 6)  HYDROmorphone  Injectable 1 milliGRAM(s) IV Push every 10 minutes PRN Severe Pain (7 - 10)  ondansetron Injectable 4 milliGRAM(s) IV Push every 6 hours PRN Nausea and/or Vomiting  ondansetron Injectable 4 milliGRAM(s) IV Push once PRN Nausea and/or Vomiting      PAST MEDICAL & SURGICAL HISTORY:  DM (diabetes mellitus)      HTN (hypertension)      Chronic kidney disease, unspecified CKD stage      ESRD on dialysis      Pneumonia      Sepsis      Anemia      No significant past surgical history    Vital Signs Last 24 Hrs  T(C): 36.5 (25 May 2023 20:00), Max: 37.1 (25 May 2023 09:00)  T(F): 97.7 (25 May 2023 20:00), Max: 98.7 (25 May 2023 09:00)  HR: 97 (25 May 2023 19:45) (69 - 100)  BP: 140/63 (25 May 2023 19:45) (119/60 - 170/80)  BP(mean): 90 (25 May 2023 19:45) (80 - 115)  RR: 16 (25 May 2023 20:00) (16 - 18)  SpO2: 100% (25 May 2023 20:00) (98% - 100%)    Parameters below as of 25 May 2023 20:00  Patient On (Oxygen Delivery Method): room air    O2 Concentration (%): 21    I&O's Summary    24 May 2023 07:01  -  25 May 2023 07:00  --------------------------------------------------------  IN: 0 mL / OUT: 0 mL / NET: 0 mL    25 May 2023 07:01  -  25 May 2023 20:23  --------------------------------------------------------  IN: 590 mL / OUT: 115 mL / NET: 475 mL                              10.6   13.93 )-----------( 367      ( 25 May 2023 16:17 )             33.1     05-25    135  |  97  |  20  ----------------------------<  212<H>  4.9   |  21<L>  |  4.76<H>    Ca    8.2<L>      25 May 2023 16:17  Phos  3.6     05-25  Mg     2.3     05-25    TPro  6.2  /  Alb  3.6  /  TBili  0.3  /  DBili  x   /  AST  23  /  ALT  10  /  AlkPhos  116  05-25      Review of systems: see above    PHYSICAL EXAM:  Constitutional: Well developed / well nourished  Eyes: Anicteric, PERRLA  ENMT: nc/at  Respiratory: CTA B/L  Cardiovascular: RRR  Gastrointestinal: Soft, non distended, mild tenderness at the incision site; incision c/d/i; 2 JPs w/ SS output  Genitourinary: Urinary catheter in place  Extremities: SCD's in place and working bilaterally  Vascular: Palpable dp pulses bilaterally  Neurological: A&O x3  Skin: no rashes, ulcerations or lesions;  Musculoskeletal: Moving all extremities  Psychiatric: Responsive

## 2023-05-25 NOTE — PATIENT PROFILE ADULT - DO YOU FEEL UNSAFE AT HOME, WORK, OR SCHOOL?
Past Medical History:   Diagnosis Date    Anxiety     Bipolar disorder     Hypertension     Hypothyroidism     Polyp of descending colon 5/21/2021    Rectal polyp 5/21/2021    Screening for malignant neoplasm of colon 5/21/2021       Past Surgical History:   Procedure Laterality Date    ANKLE SURGERY Left 2001    broke ankle has a plate in this ankle    TUBAL LIGATION  1997       Review of patient's allergies indicates:  No Known Allergies    Current Facility-Administered Medications   Medication    cefazolin (ANCEF) 2 gram in dextrose 5% 50 mL IVPB (premix)    lactated ringers infusion     Current Outpatient Medications   Medication Sig    amitriptyline (ELAVIL) 50 MG tablet Take 50 mg by mouth nightly.    amLODIPine (NORVASC) 10 MG tablet Take 1 tablet (10 mg total) by mouth once daily.    ARIPiprazole (ABILIFY) 10 MG Tab Take 10 mg by mouth once daily.     busPIRone (BUSPAR) 15 MG tablet Take 15 mg by mouth 2 (two) times daily.    hydrALAZINE (APRESOLINE) 25 MG tablet Take 25 mg by mouth 3 (three) times daily.    hydroCHLOROthiazide (HYDRODIURIL) 25 MG tablet Take 1 tablet (25 mg total) by mouth once daily.    levothyroxine (SYNTHROID) 88 MCG tablet Take 1 tablet (88 mcg total) by mouth before breakfast.    lisinopriL (PRINIVIL,ZESTRIL) 40 MG tablet Take 1 tablet (40 mg total) by mouth once daily.    naproxen (NAPROSYN) 500 MG tablet Take 1 tablet (500 mg total) by mouth 2 (two) times daily.    traZODone (DESYREL) 100 MG tablet Take 2 tablets by mouth nightly as needed.     Family History       Problem Relation (Age of Onset)    Heart disease Maternal Grandmother    Hypertension Father, Sister    Mental retardation Father, Sister    Stroke Maternal Grandmother          Tobacco Use    Smoking status: Heavy Tobacco Smoker     Packs/day: 0.50     Types: Cigarettes     Start date: 1998    Smokeless tobacco: Never Used   Substance and Sexual Activity    Alcohol use: Not Currently    Drug use: Yes     Frequency: 1.0  "times per week     Types: Marijuana    Sexual activity: Yes     Partners: Male     Review of Systems   Constitutional: Negative for decreased appetite.   HENT:  Negative for congestion and stridor.    Respiratory:  Negative for cough and wheezing.    Endocrine: Negative for cold intolerance.   Hematologic/Lymphatic: Negative for adenopathy.   Skin:  Negative for color change and suspicious lesions.   Musculoskeletal:  Positive for joint pain and joint swelling. Negative for muscle cramps and neck pain.   Gastrointestinal:  Negative for abdominal pain and hematemesis.   Genitourinary:  Negative for dysuria and hematuria.   Neurological:  Negative for brief paralysis, focal weakness and weakness.   Psychiatric/Behavioral:  Negative for altered mental status and suicidal ideas.    Allergic/Immunologic: Negative for hives.   Objective:     Vital Signs (Most Recent):  Temp: 98.4 °F (36.9 °C) (03/03/22 1339)  Pulse: 78 (03/03/22 1400)  Resp: 14 (03/03/22 1400)  BP: 124/77 (03/03/22 1400)  SpO2: 96 % (03/03/22 1400) Vital Signs (24h Range):  Temp:  [98.4 °F (36.9 °C)-98.8 °F (37.1 °C)] 98.4 °F (36.9 °C)  Pulse:  [77-78] 78  Resp:  [14-22] 14  SpO2:  [95 %-97 %] 96 %  BP: ()/(57-77) 124/77     Weight: 102.1 kg (225 lb)  Height: 5' 7" (170.2 cm)  Body mass index is 35.24 kg/m².    No intake or output data in the 24 hours ending 03/03/22 1452            Left Hand/Wrist Exam     Inspection   Deformity: Wrist - present     Other     Sensory Exam  Median Distribution: normal  Ulnar Distribution: normal  Radial Distribution: normal          Vascular Exam       Capillary Refill  Left Hand: normal capillary refill    Significant Labs: All pertinent labs within the past 24 hours have been reviewed.    Significant Imaging: X-Ray: I have reviewed all pertinent results/findings and my personal findings are:  Comminuted displaced intra-articular fracture left distal radius with ulnar styloid fracture  " no

## 2023-05-26 DIAGNOSIS — E83.51 HYPOCALCEMIA: ICD-10-CM

## 2023-05-26 LAB
A1C WITH ESTIMATED AVERAGE GLUCOSE RESULT: 7.7 % — HIGH (ref 4–5.6)
ALBUMIN SERPL ELPH-MCNC: 3.3 G/DL — SIGNIFICANT CHANGE UP (ref 3.3–5)
ALP SERPL-CCNC: 104 U/L — SIGNIFICANT CHANGE UP (ref 40–120)
ALT FLD-CCNC: 10 U/L — SIGNIFICANT CHANGE UP (ref 10–45)
ANION GAP SERPL CALC-SCNC: 16 MMOL/L — SIGNIFICANT CHANGE UP (ref 5–17)
ANION GAP SERPL CALC-SCNC: 17 MMOL/L — SIGNIFICANT CHANGE UP (ref 5–17)
AST SERPL-CCNC: 31 U/L — SIGNIFICANT CHANGE UP (ref 10–40)
BASOPHILS # BLD AUTO: 0.02 K/UL — SIGNIFICANT CHANGE UP (ref 0–0.2)
BASOPHILS NFR BLD AUTO: 0.1 % — SIGNIFICANT CHANGE UP (ref 0–2)
BILIRUB SERPL-MCNC: 0.3 MG/DL — SIGNIFICANT CHANGE UP (ref 0.2–1.2)
BUN SERPL-MCNC: 24 MG/DL — HIGH (ref 7–23)
BUN SERPL-MCNC: 28 MG/DL — HIGH (ref 7–23)
CALCIUM SERPL-MCNC: 7.4 MG/DL — LOW (ref 8.4–10.5)
CALCIUM SERPL-MCNC: 7.7 MG/DL — LOW (ref 8.4–10.5)
CHLORIDE SERPL-SCNC: 100 MMOL/L — SIGNIFICANT CHANGE UP (ref 96–108)
CHLORIDE SERPL-SCNC: 101 MMOL/L — SIGNIFICANT CHANGE UP (ref 96–108)
CO2 SERPL-SCNC: 20 MMOL/L — LOW (ref 22–31)
CO2 SERPL-SCNC: 21 MMOL/L — LOW (ref 22–31)
CREAT SERPL-MCNC: 4.81 MG/DL — HIGH (ref 0.5–1.3)
CREAT SERPL-MCNC: 4.85 MG/DL — HIGH (ref 0.5–1.3)
EGFR: 12 ML/MIN/1.73M2 — LOW
EGFR: 13 ML/MIN/1.73M2 — LOW
EOSINOPHIL # BLD AUTO: 0 K/UL — SIGNIFICANT CHANGE UP (ref 0–0.5)
EOSINOPHIL NFR BLD AUTO: 0 % — SIGNIFICANT CHANGE UP (ref 0–6)
ESTIMATED AVERAGE GLUCOSE: 174 MG/DL — HIGH (ref 68–114)
GLUCOSE BLDC GLUCOMTR-MCNC: 156 MG/DL — HIGH (ref 70–99)
GLUCOSE BLDC GLUCOMTR-MCNC: 158 MG/DL — HIGH (ref 70–99)
GLUCOSE BLDC GLUCOMTR-MCNC: 178 MG/DL — HIGH (ref 70–99)
GLUCOSE BLDC GLUCOMTR-MCNC: 184 MG/DL — HIGH (ref 70–99)
GLUCOSE BLDC GLUCOMTR-MCNC: 248 MG/DL — HIGH (ref 70–99)
GLUCOSE BLDC GLUCOMTR-MCNC: 260 MG/DL — HIGH (ref 70–99)
GLUCOSE SERPL-MCNC: 166 MG/DL — HIGH (ref 70–99)
GLUCOSE SERPL-MCNC: 172 MG/DL — HIGH (ref 70–99)
HCT VFR BLD CALC: 32.5 % — LOW (ref 39–50)
HGB BLD-MCNC: 10.1 G/DL — LOW (ref 13–17)
IMM GRANULOCYTES NFR BLD AUTO: 0.5 % — SIGNIFICANT CHANGE UP (ref 0–0.9)
LYMPHOCYTES # BLD AUTO: 1.38 K/UL — SIGNIFICANT CHANGE UP (ref 1–3.3)
LYMPHOCYTES # BLD AUTO: 9.7 % — LOW (ref 13–44)
MAGNESIUM SERPL-MCNC: 2.1 MG/DL — SIGNIFICANT CHANGE UP (ref 1.6–2.6)
MCHC RBC-ENTMCNC: 31.1 GM/DL — LOW (ref 32–36)
MCHC RBC-ENTMCNC: 32 PG — SIGNIFICANT CHANGE UP (ref 27–34)
MCV RBC AUTO: 102.8 FL — HIGH (ref 80–100)
MONOCYTES # BLD AUTO: 0.93 K/UL — HIGH (ref 0–0.9)
MONOCYTES NFR BLD AUTO: 6.6 % — SIGNIFICANT CHANGE UP (ref 2–14)
NEUTROPHILS # BLD AUTO: 11.76 K/UL — HIGH (ref 1.8–7.4)
NEUTROPHILS NFR BLD AUTO: 83.1 % — HIGH (ref 43–77)
NRBC # BLD: 0 /100 WBCS — SIGNIFICANT CHANGE UP (ref 0–0)
PHOSPHATE SERPL-MCNC: 2.8 MG/DL — SIGNIFICANT CHANGE UP (ref 2.5–4.5)
PLATELET # BLD AUTO: 348 K/UL — SIGNIFICANT CHANGE UP (ref 150–400)
POTASSIUM SERPL-MCNC: 4.5 MMOL/L — SIGNIFICANT CHANGE UP (ref 3.5–5.3)
POTASSIUM SERPL-MCNC: 5.2 MMOL/L — SIGNIFICANT CHANGE UP (ref 3.5–5.3)
POTASSIUM SERPL-SCNC: 4.5 MMOL/L — SIGNIFICANT CHANGE UP (ref 3.5–5.3)
POTASSIUM SERPL-SCNC: 5.2 MMOL/L — SIGNIFICANT CHANGE UP (ref 3.5–5.3)
PROT SERPL-MCNC: 5.9 G/DL — LOW (ref 6–8.3)
RBC # BLD: 3.16 M/UL — LOW (ref 4.2–5.8)
RBC # FLD: 16.8 % — HIGH (ref 10.3–14.5)
SODIUM SERPL-SCNC: 137 MMOL/L — SIGNIFICANT CHANGE UP (ref 135–145)
SODIUM SERPL-SCNC: 138 MMOL/L — SIGNIFICANT CHANGE UP (ref 135–145)
TACROLIMUS SERPL-MCNC: 6.1 NG/ML — SIGNIFICANT CHANGE UP
WBC # BLD: 14.16 K/UL — HIGH (ref 3.8–10.5)
WBC # FLD AUTO: 14.16 K/UL — HIGH (ref 3.8–10.5)

## 2023-05-26 PROCEDURE — 99233 SBSQ HOSP IP/OBS HIGH 50: CPT | Mod: GC

## 2023-05-26 PROCEDURE — 99232 SBSQ HOSP IP/OBS MODERATE 35: CPT | Mod: GC,24

## 2023-05-26 RX ORDER — VALGANCICLOVIR 450 MG/1
450 TABLET, FILM COATED ORAL
Refills: 0 | Status: DISCONTINUED | OUTPATIENT
Start: 2023-05-26 | End: 2023-06-01

## 2023-05-26 RX ORDER — CALCITRIOL 0.5 UG/1
0.25 CAPSULE ORAL DAILY
Refills: 0 | Status: DISCONTINUED | OUTPATIENT
Start: 2023-05-26 | End: 2023-06-01

## 2023-05-26 RX ORDER — PANTOPRAZOLE 40 MG/1
40 TABLET, DELAYED RELEASE ORAL DAILY
Qty: 90 | Refills: 3 | Status: ACTIVE | COMMUNITY
Start: 2023-05-26 | End: 1900-01-01

## 2023-05-26 RX ORDER — SENNOSIDES 8.6 MG
8.6 TABLET ORAL DAILY
Qty: 30 | Refills: 3 | Status: ACTIVE | COMMUNITY
Start: 2023-05-26 | End: 1900-01-01

## 2023-05-26 RX ORDER — NIFEDIPINE 30 MG
30 TABLET, EXTENDED RELEASE 24 HR ORAL DAILY
Refills: 0 | Status: DISCONTINUED | OUTPATIENT
Start: 2023-05-26 | End: 2023-05-29

## 2023-05-26 RX ORDER — REPAGLINIDE 1 MG/1
1 TABLET ORAL
Refills: 0 | Status: DISCONTINUED | OUTPATIENT
Start: 2023-05-26 | End: 2023-05-30

## 2023-05-26 RX ORDER — CHLORHEXIDINE GLUCONATE 213 G/1000ML
1 SOLUTION TOPICAL DAILY
Refills: 0 | Status: DISCONTINUED | OUTPATIENT
Start: 2023-05-26 | End: 2023-06-01

## 2023-05-26 RX ORDER — TACROLIMUS 5 MG/1
4 CAPSULE ORAL
Refills: 0 | Status: DISCONTINUED | OUTPATIENT
Start: 2023-05-27 | End: 2023-05-28

## 2023-05-26 RX ADMIN — Medication 1 TABLET(S): at 11:12

## 2023-05-26 RX ADMIN — CHLORHEXIDINE GLUCONATE 1 APPLICATION(S): 213 SOLUTION TOPICAL at 11:13

## 2023-05-26 RX ADMIN — MYCOPHENOLATE MOFETIL 1 GRAM(S): 250 CAPSULE ORAL at 20:10

## 2023-05-26 RX ADMIN — Medication 2: at 08:33

## 2023-05-26 RX ADMIN — Medication 500000 UNIT(S): at 17:08

## 2023-05-26 RX ADMIN — Medication 500000 UNIT(S): at 00:13

## 2023-05-26 RX ADMIN — Medication 2: at 21:57

## 2023-05-26 RX ADMIN — Medication 500000 UNIT(S): at 23:57

## 2023-05-26 RX ADMIN — Medication 4: at 12:39

## 2023-05-26 RX ADMIN — REPAGLINIDE 1 MILLIGRAM(S): 1 TABLET ORAL at 17:09

## 2023-05-26 RX ADMIN — SENNA PLUS 2 TABLET(S): 8.6 TABLET ORAL at 21:57

## 2023-05-26 RX ADMIN — Medication 125 MILLIGRAM(S): at 17:08

## 2023-05-26 RX ADMIN — REPAGLINIDE 1 MILLIGRAM(S): 1 TABLET ORAL at 12:39

## 2023-05-26 RX ADMIN — Medication 125 MILLIGRAM(S): at 05:24

## 2023-05-26 RX ADMIN — CALCITRIOL 0.25 MICROGRAM(S): 0.5 CAPSULE ORAL at 17:08

## 2023-05-26 RX ADMIN — Medication 500000 UNIT(S): at 05:24

## 2023-05-26 RX ADMIN — Medication 30 MILLIGRAM(S): at 09:16

## 2023-05-26 RX ADMIN — MYCOPHENOLATE MOFETIL 1 GRAM(S): 250 CAPSULE ORAL at 07:52

## 2023-05-26 RX ADMIN — GABAPENTIN 100 MILLIGRAM(S): 400 CAPSULE ORAL at 17:09

## 2023-05-26 RX ADMIN — Medication 2: at 17:09

## 2023-05-26 RX ADMIN — FAMOTIDINE 20 MILLIGRAM(S): 10 INJECTION INTRAVENOUS at 11:12

## 2023-05-26 RX ADMIN — TACROLIMUS 5 MILLIGRAM(S): 5 CAPSULE ORAL at 07:52

## 2023-05-26 RX ADMIN — Medication 500000 UNIT(S): at 11:12

## 2023-05-26 RX ADMIN — VALGANCICLOVIR 450 MILLIGRAM(S): 450 TABLET, FILM COATED ORAL at 11:12

## 2023-05-26 RX ADMIN — GABAPENTIN 100 MILLIGRAM(S): 400 CAPSULE ORAL at 05:24

## 2023-05-26 RX ADMIN — POLYETHYLENE GLYCOL 3350 17 GRAM(S): 17 POWDER, FOR SOLUTION ORAL at 11:13

## 2023-05-26 NOTE — PROGRESS NOTE ADULT - SUBJECTIVE AND OBJECTIVE BOX
Transplant Surgery - Multidisciplinary Rounds  --------------------------------------------------------------  DDRT Date:  5/25     POD#1    Present:   Patient seen and examined with multidisciplinary team including Transplant Surgeon: Dr. Wade. Transplant Nephrologist: Dr. ABDIEL Garcia, Transplant Pharmacist: GLADIS Pendleton, Resident Dyllan, and unit RN during am rounds.  Disciplines not in attendance will be notified of the plan.    Interval Events:  - POC w/nl   - u/s with patent artery/vein, appropriate indices, unable to visualize arterial anastomosis   - post-op K+ w 5.5, received insulin, dextrose, lokelma, bicarb w repeat 4.5    Immunosupression:   Induction: simulect, sst                                                 Maintenance immunosupression: Env 5, mmf 1/1, SST  Ongoing monitoring for signs of rejection.    Potential Discharge date: pending clinical improvement     Education:  Medications    Plan of care:  See Below    MEDICATIONS  (STANDING):  basiliximab  IVPB 20 milliGRAM(s) IV Intermittent once  chlorhexidine 2% Cloths 1 Application(s) Topical daily  dextrose 5%. 1000 milliLiter(s) (100 mL/Hr) IV Continuous <Continuous>  dextrose 5%. 1000 milliLiter(s) (50 mL/Hr) IV Continuous <Continuous>  dextrose 50% Injectable 25 Gram(s) IV Push once  dextrose 50% Injectable 12.5 Gram(s) IV Push once  dextrose 50% Injectable 25 Gram(s) IV Push once  famotidine    Tablet 20 milliGRAM(s) Oral daily  gabapentin 100 milliGRAM(s) Oral two times a day  glucagon  Injectable 1 milliGRAM(s) IntraMuscular once  insulin lispro (ADMELOG) corrective regimen sliding scale   SubCutaneous three times a day before meals  insulin lispro (ADMELOG) corrective regimen sliding scale   SubCutaneous at bedtime  methylPREDNISolone sodium succinate Injectable   IV Push   methylPREDNISolone sodium succinate Injectable 125 milliGRAM(s) IV Push two times a day  mycophenolate mofetil 1 Gram(s) Oral <User Schedule>  NIFEdipine XL 30 milliGRAM(s) Oral daily  nystatin    Suspension 935416 Unit(s) Swish and Swallow four times a day  polyethylene glycol 3350 17 Gram(s) Oral daily  repaglinide 1 milliGRAM(s) Oral three times a day before meals  senna 2 Tablet(s) Oral at bedtime  tacrolimus ER Tablet (ENVARSUS XR) 5 milliGRAM(s) Oral <User Schedule>  trimethoprim   80 mG/sulfamethoxazole 400 mG 1 Tablet(s) Oral daily  valGANciclovir 450 milliGRAM(s) Oral <User Schedule>    MEDICATIONS  (PRN):  dextrose Oral Gel 15 Gram(s) Oral once PRN Blood Glucose LESS THAN 70 milliGRAM(s)/deciliter  ondansetron Injectable 4 milliGRAM(s) IV Push every 6 hours PRN Nausea and/or Vomiting      PAST MEDICAL & SURGICAL HISTORY:  DM (diabetes mellitus)      HTN (hypertension)      Chronic kidney disease, unspecified CKD stage      ESRD on dialysis      Pneumonia      Sepsis      Anemia      No significant past surgical history      No significant past surgical history      No significant past surgical history          Vital Signs Last 24 Hrs  T(C): 36.7 (26 May 2023 09:00), Max: 36.7 (25 May 2023 10:18)  T(F): 98.1 (26 May 2023 09:00), Max: 98.1 (25 May 2023 10:18)  HR: 94 (26 May 2023 09:00) (74 - 103)  BP: 174/77 (26 May 2023 09:00) (111/58 - 174/77)  BP(mean): 111 (26 May 2023 09:00) (77 - 115)  RR: 16 (26 May 2023 05:00) (16 - 18)  SpO2: 100% (26 May 2023 09:00) (98% - 100%)    Parameters below as of 26 May 2023 05:00  Patient On (Oxygen Delivery Method): room air        I&O's Summary    25 May 2023 07:01  -  26 May 2023 07:00  --------------------------------------------------------  IN: 1340 mL / OUT: 371.5 mL / NET: 968.5 mL    26 May 2023 07:01  -  26 May 2023 09:57  --------------------------------------------------------  IN: 70 mL / OUT: 25 mL / NET: 45 mL                              10.1   14.16 )-----------( 348      ( 26 May 2023 06:38 )             32.5     05-26    138  |  101  |  28<H>  ----------------------------<  166<H>  5.2   |  20<L>  |  4.81<H>    Ca    7.4<L>      26 May 2023 06:37  Phos  2.8     05-26  Mg     2.1     05-26    TPro  5.9<L>  /  Alb  3.3  /  TBili  0.3  /  DBili  x   /  AST  31  /  ALT  10  /  AlkPhos  104  05-26    Tacrolimus (), Serum: 6.1 ng/mL (05-26 @ 06:34)          Review of systems reviewed and are negative, except as noted.      PHYSICAL EXAM:  Constitutional: Well developed / well nourished  Eyes: Anicteric, PERRLA  ENMT: nc/at  Respiratory: CTA B/L  Cardiovascular: RRR  Gastrointestinal: Soft, non distended, mild tenderness at the incision site; incision c/d/i; 2 JPs w/ SS output  Genitourinary: Urinary catheter in place  Extremities: SCD's in place and working bilaterally  Vascular: Palpable dp pulses bilaterally  Neurological: A&O x3  Skin: no rashes, ulcerations or lesions;  Musculoskeletal: Moving all extremities  Psychiatric: Responsive

## 2023-05-26 NOTE — PROGRESS NOTE ADULT - SUBJECTIVE AND OBJECTIVE BOX
DARIEN MCGINNIS is a 68y Male s/p DDRT on 5/25/23. PMH is significant for HTN, DM, ESRD on HD.    S/p DDRT under basiliximab induction with 1a/2v/1u. Donor age ~70s. KDPI 96%. Terminal creatinine 0.8. CIT 23hr 16min. WIT 43 min. cPRA 3%.    Allergies  ·	NKDA    Serologies  ·	CMV D-/R+ (moderate-risk)  ·	EBV D+/R+  ·	Donor HCV GIORGI and AB negative    Transplant Medications  Induction  - Basiliximab 20 mg POD 0 (given in OR) and POD 4  - Methyprednisolone taper (switch to PO prednisone on POD 4)            POD 0: 500 mg IV in OR            POD 1: 125 mg IV Q12H            POD 2: 60 mg IV Q12H            POD 3: 30 mg IV Q12H        Maintenance Immunosuppression  - Tacrolimus (Envarsus) 0.14 mg/kg daily (Adjust for goal trough: 8-10)  - Mycophenolate 1,000 mg PO Q12H  - Prednisone             POD 4: 20 mg PO Q12H            POD 5: 10 mg PO Q12H            POD 6: 5 mg PO Q12H            POD 7-: 5 mg daily     Anti-infection   - Bactrim SS tablet (frequency based on renal function)  - Valganciclovir (dose based on CMV serostatus and frequency based on renal function)  - Nystatin swish and swallow 5 mL four times daily    Surgical prophylaxis pre- and intra-operative dosing  - Cefazolin    Prophylaxis  - GI ppx: famotidine 20 mg daily  - Bowel ppx: senna  - DVT: sequential compression device  - Pain:            Mild: Acetaminophen 650 mg every 6 hours PRN           Moderate: Tramadol 25 mg every 4 hours PRN (adjust for renal function)           Severe: Tramadol 50 mg every 4 hours PRN (adjust for renal function)    Home Medications:  acetaminophen 325 mg oral tablet: 2 tab(s) orally every 6 hours, As needed, Temp greater or equal to 38C (100.4F), Mild Pain (1 - 3) OTC  (22 Feb 2022 16:53)  hydrALAZINE 10 mg oral tablet: 1 tab(s) orally 3 times a day, As needed, Systolic blood pressure &gt; 170 (22 Feb 2022 15:17)    Plan  Continue simulect induction, next dose ia 5/29  Recommend valganciclovir 450mg MWF until CrlCl > 60 ml/min  Recommend restarting prandin 1mg TID when advancing diet  Recommend DC famotidine and restarting home pantoprazole 40mg daily    Outpatient medication reconciliation reviewed and will be re-started appropriately.  Plan discussed with multidisciplinary team.     Les Ledesma, PharmD

## 2023-05-26 NOTE — DIETITIAN INITIAL EVALUATION ADULT - REASON INDICATOR FOR ASSESSMENT
Pt seen for post kidney transplant recipient nutrition evaluation per department protocol (POD1 DDRT 05/25)  Source: electronic medical record, Pt interview via  services with wife and granddaughter present via face time (Bhumi #[id number])

## 2023-05-26 NOTE — DIETITIAN INITIAL EVALUATION ADULT - PERSON TAUGHT/METHOD
Provided education on post transplant nutrition therapy and food safety guidelines for transplant recipients. Discussed importance of thoroughly washing all fresh fruits/vegetables, importance of avoiding uncooked/raw/unpasteurized foods, avoiding pre-made deli/buffet/salad bar meals. Foods recommended as healthy well balanced diet and importance of adequate protein intakes for proper post-surgical healing discussed. Reviewed recommendations to avoid grapefruit, pomegranate and star fruit while taking immunosuppressant medication. Reviewed recommendations for moderate intake of sodium and carbohydrates with transplant medications. Reviewed effect of steroids on BG levels and importance of limiting concentrated sweets. Pt was receptive and expressed understanding.All questions answered. Provided nutrition handout: USDA Food Safety for Transplant Recipients booklet. Discussed diet education at length, c emphasis on which foods contain carbohydrates, portion sizes, portion control especially of carbohydrate rich foods, combining protein and carbohydrates at meal and snack times, and need for consistency in carbohydrate intake. Encouraged checking blood glucose levels and checking randomly at times 2 hours after the meal time to determine how different foods affect blood glucose levels. Discussed how to modify previous diet to better control blood glucose levels. Discussed importance of adequate consumption of meals/supplements to optimize protein-energy intake. Encouraged small/frequent meals, nutrient dense snacks, prioritizing protein foods at meal time./verbal instruction/written material/individual instruction/teach back - (Patient repeats in own words)/patient instructed/spouse instructed/support person

## 2023-05-26 NOTE — DIETITIAN INITIAL EVALUATION ADULT - ENERGY INTAKE
Pt reports poor appetite/PO intake, <50% of meals consumed in setting of limited culturally preferred foods.   - Amenable to receiving oral nutritional supplement to optimize PO intake: [employer] Renal Support 3x/day  - Food preferences obtained; RD to honor as able.  Poor (<50%)

## 2023-05-26 NOTE — DIETITIAN INITIAL EVALUATION ADULT - NS FNS DIET ORDER
Diet, Renal Restrictions:   For patients receiving Renal Replacement - No Protein Restr, No Conc K, No Conc Phos, Low Sodium  Consistent Carbohydrate {Evening Snack} (CSTCHOSN) (05-26-23 @ 08:10)

## 2023-05-26 NOTE — DIETITIAN INITIAL EVALUATION ADULT - PERTINENT MEDS FT
MEDICATIONS  (STANDING):  basiliximab  IVPB 20 milliGRAM(s) IV Intermittent once  chlorhexidine 2% Cloths 1 Application(s) Topical daily  dextrose 5%. 1000 milliLiter(s) (100 mL/Hr) IV Continuous <Continuous>  dextrose 5%. 1000 milliLiter(s) (50 mL/Hr) IV Continuous <Continuous>  dextrose 50% Injectable 25 Gram(s) IV Push once  dextrose 50% Injectable 12.5 Gram(s) IV Push once  dextrose 50% Injectable 25 Gram(s) IV Push once  famotidine    Tablet 20 milliGRAM(s) Oral daily  gabapentin 100 milliGRAM(s) Oral two times a day  glucagon  Injectable 1 milliGRAM(s) IntraMuscular once  insulin lispro (ADMELOG) corrective regimen sliding scale   SubCutaneous three times a day before meals  insulin lispro (ADMELOG) corrective regimen sliding scale   SubCutaneous at bedtime  methylPREDNISolone sodium succinate Injectable   IV Push   methylPREDNISolone sodium succinate Injectable 125 milliGRAM(s) IV Push two times a day  mycophenolate mofetil 1 Gram(s) Oral <User Schedule>  NIFEdipine XL 30 milliGRAM(s) Oral daily  nystatin    Suspension 896238 Unit(s) Swish and Swallow four times a day  polyethylene glycol 3350 17 Gram(s) Oral daily  repaglinide 1 milliGRAM(s) Oral three times a day before meals  senna 2 Tablet(s) Oral at bedtime  trimethoprim   80 mG/sulfamethoxazole 400 mG 1 Tablet(s) Oral daily  valGANciclovir 450 milliGRAM(s) Oral <User Schedule>    MEDICATIONS  (PRN):  dextrose Oral Gel 15 Gram(s) Oral once PRN Blood Glucose LESS THAN 70 milliGRAM(s)/deciliter  ondansetron Injectable 4 milliGRAM(s) IV Push every 6 hours PRN Nausea and/or Vomiting

## 2023-05-26 NOTE — DIETITIAN INITIAL EVALUATION ADULT - ADD RECOMMEND
3) Add Nephro-anay to optimize micronutrient intake.   4) Will continue to monitor PO intake, weight, labs, skin, GI status, urine output.   5) Encourage PO intake and provide food preference as able.  6) Provided education on post transplant nutrition therapy and food safety guidelines for transplant recipients with nutrition package before discharge - made aware RD remains available.

## 2023-05-26 NOTE — DIETITIAN INITIAL EVALUATION ADULT - ETIOLOGY
limited prior education on post-transplant nutrition therapy and food safety guidelines  Increased demands for nutrients for surgical healing

## 2023-05-26 NOTE — DIETITIAN INITIAL EVALUATION ADULT - PERTINENT LABORATORY DATA
05-26    138  |  101  |  28<H>  ----------------------------<  166<H>  5.2   |  20<L>  |  4.81<H>    Ca    7.4<L>      26 May 2023 06:37  Phos  2.8     05-26  Mg     2.1     05-26    TPro  5.9<L>  /  Alb  3.3  /  TBili  0.3  /  DBili  x   /  AST  31  /  ALT  10  /  AlkPhos  104  05-26  POCT Blood Glucose.: 184 mg/dL (05-26-23 @ 08:06)  A1C with Estimated Average Glucose Result: 7.7 % (05-26-23 @ 06:38)

## 2023-05-26 NOTE — PROGRESS NOTE ADULT - PROBLEM SELECTOR PLAN 1
Pt. with h/o ESRD attributed to DM nephropathy, on HD (Last treatment: 5/24). Underwent DDRT on 5/25/23 under Simulect induction. Donor information: Male in 70's, KDPI= 96%. Labs reviewed. Pt. clinically stable. UOP is ~280 cc over the past 24 hours. No acute indications for HD currently, will continue to monitor. Continue with immunosuppression regimen, as outlined below. Monitor labs and urine output. Avoid nephrotoxins.

## 2023-05-26 NOTE — PHYSICAL THERAPY INITIAL EVALUATION ADULT - NSPTDISCHREC_GEN_A_CORE
pt cleared for DC from PT standpoint. please reconsult as appropriate/if status changes. pt aware and in agreement./No skilled PT needs

## 2023-05-26 NOTE — DIETITIAN INITIAL EVALUATION ADULT - PHYSCIAL ASSESSMENT
Weight Hx Per:  - Source: Patient  - UBW: 132 pounds   - Reported weight changes: none    Current Admission Weights:  - Dosing weight: 131.6 pounds (05/25)    Weight Change:  - Weight stable. Will continue to monitor weight trends as available/able.     IBW: 124 pounds   %IBW:  106%

## 2023-05-26 NOTE — PHYSICAL THERAPY INITIAL EVALUATION ADULT - FOLLOWS COMMANDS/ANSWERS QUESTIONS, REHAB EVAL
Routing refill request to provider for review/approval because:  Labs not current:  ALT, AST, Creatinine, CBC  Medication is reported/historical  Patient needs to be seen because it has been more than 1 year since last office visit.  Letter already sent    LOV: 8/9/17   100% of the time

## 2023-05-26 NOTE — PHYSICAL THERAPY INITIAL EVALUATION ADULT - PERTINENT HX OF CURRENT PROBLEM, REHAB EVAL
PAtient with ESRD, on iHD, and for DDRT. Hosp Course: OR for above; 68 year old male PMH HTN, ESRD 2/2 DM nephropathy requiring iHD (via LUE AVF) since Dec 2021. Patient last received HD Wednesday 5/24. He makes a very small amount of urine. He is now s/p DDRT w/ simulect induction. Single artery, double vein, single ureter. CHEST XRAY: clear.

## 2023-05-26 NOTE — DIETITIAN INITIAL EVALUATION ADULT - DIET TYPE
1) To optimize PO intake in setting of dietary restrictions recommend: consistent carbohydrate with snack, lacto-vegetarian diet upon discharge. Recommend follow up visit with Transplant MD and outpatient RD for dietary modifications as warranted.   2) Add oral nutrition supplement: [employer] Renal Support 3x/day (PR2RN requested)

## 2023-05-26 NOTE — PHYSICAL THERAPY INITIAL EVALUATION ADULT - ADDITIONAL COMMENTS
pt states he lives with family in a private home with 2 steps to enter, first floor set up. Pt states prior to admission being independent with all functional mobility and ADLs. pt denies owning any DME.

## 2023-05-26 NOTE — DIETITIAN INITIAL EVALUATION ADULT - OTHER INFO
- GI: No GI distress reported at time of RD visit. Last BM: 05/24 PTA, Bowel regimen: senna, Miralax. Ordered for Pepcid, Simethicone.   - Renal: ESRD on HD, POD1 DDRT 05/25. Ordered for Deltasone, Simulect, Solu-medrol, Tacrolimus, Cellcept.   - Urine output per flow sheets: 90 mL thus far (05/26), 281 ml (05/25)  - Endo: Hx of DM2, managed with: repaglinide. Fingersticks checked: 1-2x/week (AM fasting <120 Mg/dl). Hgba1c: unknown. BG in-house: elevated in setting of steroid regimen, glycemic control regimen in-house: SSI, repaglinide.

## 2023-05-26 NOTE — PROGRESS NOTE ADULT - SUBJECTIVE AND OBJECTIVE BOX
Patient is a 68y Male whom presented to the hospital with esrd on hd    PAST MEDICAL & SURGICAL HISTORY:  DM (diabetes mellitus)      HTN (hypertension)      Chronic kidney disease, unspecified CKD stage      ESRD on dialysis      Pneumonia      Sepsis      Anemia      No significant past surgical history          MEDICATIONS  (STANDING):  basiliximab  IVPB 20 milliGRAM(s) IV Intermittent once  calcitriol   Capsule 0.25 MICROGram(s) Oral daily  chlorhexidine 2% Cloths 1 Application(s) Topical daily  dextrose 5%. 1000 milliLiter(s) (100 mL/Hr) IV Continuous <Continuous>  dextrose 5%. 1000 milliLiter(s) (50 mL/Hr) IV Continuous <Continuous>  dextrose 50% Injectable 25 Gram(s) IV Push once  dextrose 50% Injectable 12.5 Gram(s) IV Push once  dextrose 50% Injectable 25 Gram(s) IV Push once  famotidine    Tablet 20 milliGRAM(s) Oral daily  gabapentin 100 milliGRAM(s) Oral two times a day  glucagon  Injectable 1 milliGRAM(s) IntraMuscular once  insulin lispro (ADMELOG) corrective regimen sliding scale   SubCutaneous three times a day before meals  insulin lispro (ADMELOG) corrective regimen sliding scale   SubCutaneous at bedtime  methylPREDNISolone sodium succinate Injectable   IV Push   mycophenolate mofetil 1 Gram(s) Oral <User Schedule>  NIFEdipine XL 30 milliGRAM(s) Oral daily  nystatin    Suspension 021206 Unit(s) Swish and Swallow four times a day  polyethylene glycol 3350 17 Gram(s) Oral daily  repaglinide 1 milliGRAM(s) Oral three times a day before meals  senna 2 Tablet(s) Oral at bedtime  trimethoprim   80 mG/sulfamethoxazole 400 mG 1 Tablet(s) Oral daily  valGANciclovir 450 milliGRAM(s) Oral <User Schedule>      Allergies    No Known Allergies    Intolerances        SOCIAL HISTORY:  Denies ETOh,Smoking,     FAMILY HISTORY:  No pertinent family history in first degree relatives        REVIEW OF SYSTEMS:    CONSTITUTIONAL: No weakness, fevers or chills  EYES/ENT: No visual changes;  no throat pain   NECK: No pain or stiffness  RESPIRATORY: No cough, wheezing, hemoptysis; No shortness of breath  CARDIOVASCULAR: No chest pain or palpitations  GASTROINTESTINAL: No abdominal or epigastric pain. No nausea, vomiting,     No diarrhea or constipation. No melena   GENITOURINARY: No dysuria, frequency or hematuria  NEUROLOGICAL: No numbness or weakness  SKIN: dry      VITAL:  T(C): , Max: 37.1 (05-26-23 @ 13:00)  T(F): , Max: 98.8 (05-26-23 @ 13:00)  HR: 105 (05-26-23 @ 13:00)  BP: 140/64 (05-26-23 @ 13:00)  BP(mean): 92 (05-26-23 @ 13:00)  RR: 16 (05-26-23 @ 13:00)  SpO2: 98% (05-26-23 @ 13:00)  Wt(kg): --    I and O's:    05-25 @ 07:01  -  05-26 @ 07:00  --------------------------------------------------------  IN: 1340 mL / OUT: 371.5 mL / NET: 968.5 mL    05-26 @ 07:01  -  05-26 @ 20:28  --------------------------------------------------------  IN: 910 mL / OUT: 390 mL / NET: 520 mL                            10.1   14.16 )-----------( 348      ( 26 May 2023 06:38 )             32.5       CBC Full  -  ( 26 May 2023 06:38 )  WBC Count : 14.16 K/uL  RBC Count : 3.16 M/uL  Hemoglobin : 10.1 g/dL  Hematocrit : 32.5 %  Platelet Count - Automated : 348 K/uL  Mean Cell Volume : 102.8 fl  Mean Cell Hemoglobin : 32.0 pg  Mean Cell Hemoglobin Concentration : 31.1 gm/dL  Auto Neutrophil # : 11.76 K/uL  Auto Lymphocyte # : 1.38 K/uL  Auto Monocyte # : 0.93 K/uL  Auto Eosinophil # : 0.00 K/uL  Auto Basophil # : 0.02 K/uL  Auto Neutrophil % : 83.1 %  Auto Lymphocyte % : 9.7 %  Auto Monocyte % : 6.6 %  Auto Eosinophil % : 0.0 %  Auto Basophil % : 0.1 %      05-26    138  |  101  |  28<H>  ----------------------------<  166<H>  5.2   |  20<L>  |  4.81<H>    Ca    7.4<L>      26 May 2023 06:37  Phos  2.8     05-26  Mg     2.1     05-26    TPro  5.9<L>  /  Alb  3.3  /  TBili  0.3  /  DBili  x   /  AST  31  /  ALT  10  /  AlkPhos  104  05-26      CAPILLARY BLOOD GLUCOSE      POCT Blood Glucose.: 178 mg/dL (26 May 2023 17:06)  POCT Blood Glucose.: 248 mg/dL (26 May 2023 12:20)  POCT Blood Glucose.: 184 mg/dL (26 May 2023 08:06)  POCT Blood Glucose.: 156 mg/dL (26 May 2023 05:46)  POCT Blood Glucose.: 158 mg/dL (26 May 2023 01:45)  POCT Blood Glucose.: 263 mg/dL (25 May 2023 22:12)  POCT Blood Glucose.: 164 mg/dL (25 May 2023 21:09)      Vital Signs Last 24 Hrs  T(C): 37.1 (26 May 2023 13:00), Max: 37.1 (26 May 2023 13:00)  T(F): 98.8 (26 May 2023 13:00), Max: 98.8 (26 May 2023 13:00)  HR: 105 (26 May 2023 13:00) (92 - 105)  BP: 140/64 (26 May 2023 13:00) (111/58 - 174/77)  BP(mean): 92 (26 May 2023 13:00) (77 - 111)  RR: 16 (26 May 2023 13:00) (16 - 18)  SpO2: 98% (26 May 2023 13:00) (98% - 100%)    Parameters below as of 26 May 2023 05:00  Patient On (Oxygen Delivery Method): room air            PT/INR - ( 25 May 2023 05:10 )   PT: 11.9 sec;   INR: 1.03 ratio         PTT - ( 25 May 2023 05:10 )  PTT:32.6 sec    PHYSICAL EXAM:    Constitutional: NAD  HEENT: conjunctive   clear   Neck:  No JVD  Respiratory: CTAB  Cardiovascular: S1 and S2  Gastrointestinal: BS+, soft, NT/ND  Extremities: No peripheral edema  Neurological: A/O x 3, no focal deficits  Psychiatric: Normal mood, normal affect  : pos  [place]  Skin: No rashes  Access: Not applicable            Vital Signs Last 24 Hrs  T(C): 36.7 (26 May 2023 09:00), Max: 36.7 (25 May 2023 10:18)  T(F): 98.1 (26 May 2023 09:00), Max: 98.1 (25 May 2023 10:18)  HR: 94 (26 May 2023 09:00) (74 - 103)  BP: 174/77 (26 May 2023 09:00) (111/58 - 174/77)  BP(mean): 111 (26 May 2023 09:00) (77 - 115)  RR: 16 (26 May 2023 05:00) (16 - 18)  SpO2: 100% (26 May 2023 09:00) (98% - 100%)    Parameters below as of 26 May 2023 05:00  Patient On (Oxygen Delivery Method): room air        I&O's Summary    25 May 2023 07:01  -  26 May 2023 07:00  --------------------------------------------------------  IN: 1340 mL / OUT: 371.5 mL / NET: 968.5 mL    26 May 2023 07:01  -  26 May 2023 09:57  --------------------------------------------------------  IN: 70 mL / OUT: 25 mL / NET: 45 mL                              10.1   14.16 )-----------( 348      ( 26 May 2023 06:38 )             32.5     05-26    138  |  101  |  28<H>  ----------------------------<  166<H>  5.2   |  20<L>  |  4.81<H>    Ca    7.4<L>      26 May 2023 06:37  Phos  2.8     05-26  Mg     2.1     05-26    TPro  5.9<L>  /  Alb  3.3  /  TBili  0.3  /  DBili  x   /  AST  31  /  ALT  10  /  AlkPhos  104  05-26    Tacrolimus (), Serum: 6.1 ng/mL (05-26 @ 06:34)

## 2023-05-26 NOTE — PHYSICAL THERAPY INITIAL EVALUATION ADULT - GENERAL OBSERVATIONS, REHAB EVAL
pt roxy 30 min eval well. pt rec'd in chair, tele, 2 PRABHU, gladis, NAD, agreed to session. pt progressed from rolling walker to no device by end of session.

## 2023-05-26 NOTE — PROGRESS NOTE ADULT - SUBJECTIVE AND OBJECTIVE BOX
[facility] DIVISION OF KIDNEY DISEASES AND HYPERTENSION -- FOLLOW UP NOTE  --------------------------------------------------------------------------------    HPI: 67 yo M with h/o ESRD on HD, DM, HTN, and anemia admitted for kidney transplant. Pt. underwent DDRT on 5/25 under Simulect induction.     24 hour events/subjective: Pt. was seen and evaluated at bedside this morning. He reports mild pain at incision site. Feels well otherwise. He denies any headaches, fevers/chills, chest pain, SOB, and LE edema.      PAST HISTORY  --------------------------------------------------------------------------------  No significant changes to PMH, PSH, FHx, SHx, unless otherwise noted    ALLERGIES & MEDICATIONS  --------------------------------------------------------------------------------  Allergies    No Known Allergies    Intolerances      Standing Inpatient Medications  basiliximab  IVPB 20 milliGRAM(s) IV Intermittent once  chlorhexidine 2% Cloths 1 Application(s) Topical daily  dextrose 5%. 1000 milliLiter(s) IV Continuous <Continuous>  dextrose 5%. 1000 milliLiter(s) IV Continuous <Continuous>  dextrose 50% Injectable 25 Gram(s) IV Push once  dextrose 50% Injectable 12.5 Gram(s) IV Push once  dextrose 50% Injectable 25 Gram(s) IV Push once  famotidine    Tablet 20 milliGRAM(s) Oral daily  gabapentin 100 milliGRAM(s) Oral two times a day  glucagon  Injectable 1 milliGRAM(s) IntraMuscular once  insulin lispro (ADMELOG) corrective regimen sliding scale   SubCutaneous three times a day before meals  insulin lispro (ADMELOG) corrective regimen sliding scale   SubCutaneous at bedtime  methylPREDNISolone sodium succinate Injectable   IV Push   methylPREDNISolone sodium succinate Injectable 125 milliGRAM(s) IV Push two times a day  mycophenolate mofetil 1 Gram(s) Oral <User Schedule>  NIFEdipine XL 30 milliGRAM(s) Oral daily  nystatin    Suspension 201128 Unit(s) Swish and Swallow four times a day  polyethylene glycol 3350 17 Gram(s) Oral daily  repaglinide 1 milliGRAM(s) Oral three times a day before meals  senna 2 Tablet(s) Oral at bedtime  trimethoprim   80 mG/sulfamethoxazole 400 mG 1 Tablet(s) Oral daily  valGANciclovir 450 milliGRAM(s) Oral <User Schedule>    PRN Inpatient Medications  dextrose Oral Gel 15 Gram(s) Oral once PRN  ondansetron Injectable 4 milliGRAM(s) IV Push every 6 hours PRN      REVIEW OF SYSTEMS  --------------------------------------------------------------------------------  See HPI    VITALS/PHYSICAL EXAM  --------------------------------------------------------------------------------  T(C): 36.7 (05-26-23 @ 09:00), Max: 36.7 (05-25-23 @ 12:12)  HR: 94 (05-26-23 @ 09:00) (74 - 103)  BP: 174/77 (05-26-23 @ 09:00) (111/58 - 174/77)  RR: 16 (05-26-23 @ 05:00) (16 - 18)  SpO2: 100% (05-26-23 @ 09:00) (98% - 100%)  Wt(kg): --  Height (cm): 160 (05-25-23 @ 12:12)  Weight (kg): 59.7 (05-25-23 @ 12:12)  BMI (kg/m2): 23.3 (05-25-23 @ 12:12)  BSA (m2): 1.62 (05-25-23 @ 12:12)      05-25-23 @ 07:01  -  05-26-23 @ 07:00  --------------------------------------------------------  IN: 1340 mL / OUT: 371.5 mL / NET: 968.5 mL    05-26-23 @ 07:01  -  05-26-23 @ 11:52  --------------------------------------------------------  IN: 70 mL / OUT: 90 mL / NET: -20 mL      Physical Exam:  Gen: Not in distress, well-appearing  HEENT: MMM  Pulm: normal respiratory effort, lungs clear to auscultation bilaterally   CV: regular rate and rhythm, S1 and S2 normal, no murmur   Abd: normoactive bowel sounds, soft and non distended abdomen. No tenderness, guarding or rigidity   : No suprapubic tenderness  Transplant: Site c/d/i with drain  Back: No spinal or CVA tenderness; no pre sacral edema  Extremities: No edema. LUE AVF with +thrill and bruit  Skin: warm, no rash, no cyanosis  Neuro: Alert and oriented to person, place and time. Normal speech. Normal affect.     LABS/STUDIES  --------------------------------------------------------------------------------              10.1   14.16 >-----------<  348      [05-26-23 @ 06:38]              32.5     138  |  101  |  28  ----------------------------<  166      [05-26-23 @ 06:37]  5.2   |  20  |  4.81        Ca     7.4     [05-26-23 @ 06:37]      Mg     2.1     [05-26-23 @ 06:37]      Phos  2.8     [05-26-23 @ 06:37]    TPro  5.9  /  Alb  3.3  /  TBili  0.3  /  DBili  x   /  AST  31  /  ALT  10  /  AlkPhos  104  [05-26-23 @ 06:37]    PT/INR: PT 11.9 , INR 1.03       [05-25-23 @ 05:10]  PTT: 32.6       [05-25-23 @ 05:10]    Creatinine Trend:  SCr 4.81 [05-26 @ 06:37]  SCr 4.85 [05-25 @ 23:55]  SCr 4.87 [05-25 @ 20:45]  SCr 4.76 [05-25 @ 16:17]  SCr 4.21 [05-25 @ 05:10]    Tacrolimus (), Serum: 6.1 ng/mL (05-26 @ 06:34)    HBsAb 40.3      [05-25-23 @ 05:10]  HBsAg Nonreact      [05-25-23 @ 05:10]  HBcAb Nonreact      [05-25-23 @ 05:10]  HCV 0.16, Nonreact      [05-25-23 @ 05:10]  HIV Nonreact      [05-25-23 @ 05:10]

## 2023-05-26 NOTE — DIETITIAN INITIAL EVALUATION ADULT - REASON FOR ADMISSION
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 Patient last received HD Wednesday 5/24. He makes a very small amount of urine. He is now s/p DDRT w/ simulect induction. Single artery, double vein, single ureter."

## 2023-05-26 NOTE — PROGRESS NOTE ADULT - ASSESSMENT
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 Patient last received HD Wednesday 5/24. He makes a very small amount of urine. He is now s/p DDRT w/ simulect induction. Single artery, double vein, single ureter.    -env 5   -cont mmf, sst, simulect on POD 4  -ppx: nystatin/bactim/valcyte/pepcid  -PRABHU x2  -renal diet/IVHL   -cont griffith   -SARAH, will monitor blood glc, add back home Prandin PRN   -will start nifedipine 30 daily for SBP control   -IS/OOBA

## 2023-05-26 NOTE — DIETITIAN INITIAL EVALUATION ADULT - ORAL INTAKE PTA/DIET HISTORY
Pt reports having a good appetite and PO intake PTA; consuming >75% of most meals. Follows lacto-vegetarian diet (no eggs). Pt denies any known food allergies or intolerances. Pt denies any micronutrient supplementation at home (Per [facility] PTA). Denies any difficulty chewing/swallowing at this time.

## 2023-05-26 NOTE — PROGRESS NOTE ADULT - ASSESSMENT
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 Patient last received HD Wednesday 5/24. He makes a very small amount of urine. 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 Patient last received HD Wednesday 5/24. He makes a very small amount of urine. He is now s/p DDRT w/ simulect induction. Single artery, double vein, single ureter.  trimethoprim   80 mG/sulfamethoxazole 400 mG 1 Tablet(s) Oral daily  valGANciclovir 450 milliGRAM(s) Oral <User Schedule>    BP monitoring,continue current antihypertensive meds, low salt diet,followup with PMD in 1-2 weeks  NIFEdipine XL 30 milliGRAM(s) Oral daily      Accuchecks monitoring and insulin sliding scale coverage, no concentrated sweets diet, serial labs and f/up with PMD, monitor HB A 1 C every 3-4 mnth

## 2023-05-27 LAB
ALBUMIN SERPL ELPH-MCNC: 3.5 G/DL — SIGNIFICANT CHANGE UP (ref 3.3–5)
ALP SERPL-CCNC: 125 U/L — HIGH (ref 40–120)
ALT FLD-CCNC: 6 U/L — LOW (ref 10–45)
ANION GAP SERPL CALC-SCNC: 17 MMOL/L — SIGNIFICANT CHANGE UP (ref 5–17)
ANION GAP SERPL CALC-SCNC: 19 MMOL/L — HIGH (ref 5–17)
AST SERPL-CCNC: 32 U/L — SIGNIFICANT CHANGE UP (ref 10–40)
BASOPHILS # BLD AUTO: 0.01 K/UL — SIGNIFICANT CHANGE UP (ref 0–0.2)
BASOPHILS NFR BLD AUTO: 0.1 % — SIGNIFICANT CHANGE UP (ref 0–2)
BILIRUB SERPL-MCNC: 0.3 MG/DL — SIGNIFICANT CHANGE UP (ref 0.2–1.2)
BUN SERPL-MCNC: 59 MG/DL — HIGH (ref 7–23)
BUN SERPL-MCNC: 60 MG/DL — HIGH (ref 7–23)
CALCIUM SERPL-MCNC: 8.3 MG/DL — LOW (ref 8.4–10.5)
CALCIUM SERPL-MCNC: 8.6 MG/DL — SIGNIFICANT CHANGE UP (ref 8.4–10.5)
CHLORIDE SERPL-SCNC: 93 MMOL/L — LOW (ref 96–108)
CHLORIDE SERPL-SCNC: 95 MMOL/L — LOW (ref 96–108)
CO2 SERPL-SCNC: 20 MMOL/L — LOW (ref 22–31)
CO2 SERPL-SCNC: 20 MMOL/L — LOW (ref 22–31)
CREAT SERPL-MCNC: 5.28 MG/DL — HIGH (ref 0.5–1.3)
CREAT SERPL-MCNC: 5.54 MG/DL — HIGH (ref 0.5–1.3)
EGFR: 11 ML/MIN/1.73M2 — LOW
EGFR: 11 ML/MIN/1.73M2 — LOW
EOSINOPHIL # BLD AUTO: 0 K/UL — SIGNIFICANT CHANGE UP (ref 0–0.5)
EOSINOPHIL NFR BLD AUTO: 0 % — SIGNIFICANT CHANGE UP (ref 0–6)
GLUCOSE BLDC GLUCOMTR-MCNC: 183 MG/DL — HIGH (ref 70–99)
GLUCOSE BLDC GLUCOMTR-MCNC: 221 MG/DL — HIGH (ref 70–99)
GLUCOSE BLDC GLUCOMTR-MCNC: 253 MG/DL — HIGH (ref 70–99)
GLUCOSE BLDC GLUCOMTR-MCNC: 282 MG/DL — HIGH (ref 70–99)
GLUCOSE BLDC GLUCOMTR-MCNC: 288 MG/DL — HIGH (ref 70–99)
GLUCOSE BLDC GLUCOMTR-MCNC: 322 MG/DL — HIGH (ref 70–99)
GLUCOSE SERPL-MCNC: 174 MG/DL — HIGH (ref 70–99)
GLUCOSE SERPL-MCNC: 264 MG/DL — HIGH (ref 70–99)
HCT VFR BLD CALC: 30 % — LOW (ref 39–50)
HGB BLD-MCNC: 9.6 G/DL — LOW (ref 13–17)
IMM GRANULOCYTES NFR BLD AUTO: 0.8 % — SIGNIFICANT CHANGE UP (ref 0–0.9)
LYMPHOCYTES # BLD AUTO: 0.86 K/UL — LOW (ref 1–3.3)
LYMPHOCYTES # BLD AUTO: 5.4 % — LOW (ref 13–44)
MAGNESIUM SERPL-MCNC: 2.7 MG/DL — HIGH (ref 1.6–2.6)
MCHC RBC-ENTMCNC: 32 GM/DL — SIGNIFICANT CHANGE UP (ref 32–36)
MCHC RBC-ENTMCNC: 32.2 PG — SIGNIFICANT CHANGE UP (ref 27–34)
MCV RBC AUTO: 100.7 FL — HIGH (ref 80–100)
MONOCYTES # BLD AUTO: 0.99 K/UL — HIGH (ref 0–0.9)
MONOCYTES NFR BLD AUTO: 6.2 % — SIGNIFICANT CHANGE UP (ref 2–14)
NEUTROPHILS # BLD AUTO: 13.98 K/UL — HIGH (ref 1.8–7.4)
NEUTROPHILS NFR BLD AUTO: 87.5 % — HIGH (ref 43–77)
NRBC # BLD: 0 /100 WBCS — SIGNIFICANT CHANGE UP (ref 0–0)
PHOSPHATE SERPL-MCNC: 4.8 MG/DL — HIGH (ref 2.5–4.5)
PLATELET # BLD AUTO: 346 K/UL — SIGNIFICANT CHANGE UP (ref 150–400)
POTASSIUM SERPL-MCNC: 4.5 MMOL/L — SIGNIFICANT CHANGE UP (ref 3.5–5.3)
POTASSIUM SERPL-MCNC: 5.6 MMOL/L — HIGH (ref 3.5–5.3)
POTASSIUM SERPL-SCNC: 4.5 MMOL/L — SIGNIFICANT CHANGE UP (ref 3.5–5.3)
POTASSIUM SERPL-SCNC: 5.6 MMOL/L — HIGH (ref 3.5–5.3)
PROT SERPL-MCNC: 6.5 G/DL — SIGNIFICANT CHANGE UP (ref 6–8.3)
RBC # BLD: 2.98 M/UL — LOW (ref 4.2–5.8)
RBC # FLD: 17 % — HIGH (ref 10.3–14.5)
SODIUM SERPL-SCNC: 132 MMOL/L — LOW (ref 135–145)
SODIUM SERPL-SCNC: 132 MMOL/L — LOW (ref 135–145)
TACROLIMUS SERPL-MCNC: 9.5 NG/ML — SIGNIFICANT CHANGE UP
WBC # BLD: 15.96 K/UL — HIGH (ref 3.8–10.5)
WBC # FLD AUTO: 15.96 K/UL — HIGH (ref 3.8–10.5)

## 2023-05-27 PROCEDURE — 99232 SBSQ HOSP IP/OBS MODERATE 35: CPT

## 2023-05-27 PROCEDURE — 99232 SBSQ HOSP IP/OBS MODERATE 35: CPT | Mod: 24

## 2023-05-27 RX ORDER — SODIUM ZIRCONIUM CYCLOSILICATE 10 G/10G
10 POWDER, FOR SUSPENSION ORAL ONCE
Refills: 0 | Status: COMPLETED | OUTPATIENT
Start: 2023-05-27 | End: 2023-05-27

## 2023-05-27 RX ORDER — TRAMADOL HYDROCHLORIDE 50 MG/1
25 TABLET ORAL EVERY 6 HOURS
Refills: 0 | Status: DISCONTINUED | OUTPATIENT
Start: 2023-05-27 | End: 2023-06-01

## 2023-05-27 RX ORDER — INSULIN LISPRO 100/ML
4 VIAL (ML) SUBCUTANEOUS ONCE
Refills: 0 | Status: COMPLETED | OUTPATIENT
Start: 2023-05-27 | End: 2023-05-27

## 2023-05-27 RX ORDER — TRAMADOL HYDROCHLORIDE 50 MG/1
50 TABLET ORAL EVERY 6 HOURS
Refills: 0 | Status: DISCONTINUED | OUTPATIENT
Start: 2023-05-27 | End: 2023-06-01

## 2023-05-27 RX ORDER — INSULIN HUMAN 100 [IU]/ML
10 INJECTION, SOLUTION SUBCUTANEOUS ONCE
Refills: 0 | Status: COMPLETED | OUTPATIENT
Start: 2023-05-27 | End: 2023-05-27

## 2023-05-27 RX ORDER — DEXTROSE 50 % IN WATER 50 %
50 SYRINGE (ML) INTRAVENOUS ONCE
Refills: 0 | Status: COMPLETED | OUTPATIENT
Start: 2023-05-27 | End: 2023-05-27

## 2023-05-27 RX ADMIN — Medication 500000 UNIT(S): at 12:30

## 2023-05-27 RX ADMIN — CHLORHEXIDINE GLUCONATE 1 APPLICATION(S): 213 SOLUTION TOPICAL at 12:32

## 2023-05-27 RX ADMIN — Medication 60 MILLIGRAM(S): at 17:48

## 2023-05-27 RX ADMIN — Medication 500000 UNIT(S): at 07:02

## 2023-05-27 RX ADMIN — Medication 6: at 17:47

## 2023-05-27 RX ADMIN — Medication 50 MILLILITER(S): at 05:55

## 2023-05-27 RX ADMIN — CALCITRIOL 0.25 MICROGRAM(S): 0.5 CAPSULE ORAL at 12:31

## 2023-05-27 RX ADMIN — GABAPENTIN 100 MILLIGRAM(S): 400 CAPSULE ORAL at 17:48

## 2023-05-27 RX ADMIN — FAMOTIDINE 20 MILLIGRAM(S): 10 INJECTION INTRAVENOUS at 12:31

## 2023-05-27 RX ADMIN — REPAGLINIDE 1 MILLIGRAM(S): 1 TABLET ORAL at 16:20

## 2023-05-27 RX ADMIN — Medication 500000 UNIT(S): at 23:21

## 2023-05-27 RX ADMIN — REPAGLINIDE 1 MILLIGRAM(S): 1 TABLET ORAL at 12:30

## 2023-05-27 RX ADMIN — Medication 4 UNIT(S): at 06:46

## 2023-05-27 RX ADMIN — Medication 60 MILLIGRAM(S): at 06:45

## 2023-05-27 RX ADMIN — Medication 500000 UNIT(S): at 17:47

## 2023-05-27 RX ADMIN — REPAGLINIDE 1 MILLIGRAM(S): 1 TABLET ORAL at 08:55

## 2023-05-27 RX ADMIN — Medication 1 TABLET(S): at 12:30

## 2023-05-27 RX ADMIN — Medication 4: at 12:29

## 2023-05-27 RX ADMIN — Medication 2: at 08:54

## 2023-05-27 RX ADMIN — TRAMADOL HYDROCHLORIDE 50 MILLIGRAM(S): 50 TABLET ORAL at 23:20

## 2023-05-27 RX ADMIN — MYCOPHENOLATE MOFETIL 1 GRAM(S): 250 CAPSULE ORAL at 08:57

## 2023-05-27 RX ADMIN — INSULIN HUMAN 10 UNIT(S): 100 INJECTION, SOLUTION SUBCUTANEOUS at 05:55

## 2023-05-27 RX ADMIN — TACROLIMUS 4 MILLIGRAM(S): 5 CAPSULE ORAL at 08:57

## 2023-05-27 RX ADMIN — Medication 30 MILLIGRAM(S): at 08:56

## 2023-05-27 RX ADMIN — POLYETHYLENE GLYCOL 3350 17 GRAM(S): 17 POWDER, FOR SOLUTION ORAL at 12:31

## 2023-05-27 RX ADMIN — GABAPENTIN 100 MILLIGRAM(S): 400 CAPSULE ORAL at 07:02

## 2023-05-27 RX ADMIN — TRAMADOL HYDROCHLORIDE 25 MILLIGRAM(S): 50 TABLET ORAL at 02:42

## 2023-05-27 RX ADMIN — SODIUM ZIRCONIUM CYCLOSILICATE 10 GRAM(S): 10 POWDER, FOR SUSPENSION ORAL at 05:55

## 2023-05-27 RX ADMIN — Medication 2: at 21:14

## 2023-05-27 RX ADMIN — TRAMADOL HYDROCHLORIDE 25 MILLIGRAM(S): 50 TABLET ORAL at 03:30

## 2023-05-27 RX ADMIN — SENNA PLUS 2 TABLET(S): 8.6 TABLET ORAL at 21:14

## 2023-05-27 RX ADMIN — MYCOPHENOLATE MOFETIL 1 GRAM(S): 250 CAPSULE ORAL at 19:59

## 2023-05-27 NOTE — PROGRESS NOTE ADULT - SUBJECTIVE AND OBJECTIVE BOX
Transplant Surgery - Multidisciplinary Rounds  --------------------------------------------------------------  DDRT (1a/1v/1u + stent)     POD#2    HPI: DARIEN MCGINNIS is a 68 year old male PMH HTN, 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 Patient last received HD . He makes a very small amount of urine.    Present: Patient seen and examined with multidisciplinary team including Transplant Surgeon: Dr. Wade,  Transplant Nephrologist: Dr. Jessica Garcia, FRANCINE Bernal  and unit RN during am rounds.  Disciplines not in attendance will be notified of the plan.     Interval Events:      Immunosuppression:   Induction:  Simulect                                        Maintenance Immunosuppression: Env by level, MMF 1g BID, SST   Ongoing monitoring for signs of rejection.    Potential Discharge date: Pending clinical course   Education:  Medications  Plan of care:  See Below    MEDICATIONS  (STANDING):  basiliximab  IVPB 20 milliGRAM(s) IV Intermittent once  calcitriol   Capsule 0.25 MICROGram(s) Oral daily  chlorhexidine 2% Cloths 1 Application(s) Topical daily  dextrose 5%. 1000 milliLiter(s) (100 mL/Hr) IV Continuous <Continuous>  dextrose 5%. 1000 milliLiter(s) (50 mL/Hr) IV Continuous <Continuous>  dextrose 50% Injectable 25 Gram(s) IV Push once  dextrose 50% Injectable 12.5 Gram(s) IV Push once  dextrose 50% Injectable 25 Gram(s) IV Push once  famotidine    Tablet 20 milliGRAM(s) Oral daily  gabapentin 100 milliGRAM(s) Oral two times a day  glucagon  Injectable 1 milliGRAM(s) IntraMuscular once  insulin lispro (ADMELOG) corrective regimen sliding scale   SubCutaneous at bedtime  insulin lispro (ADMELOG) corrective regimen sliding scale   SubCutaneous three times a day before meals  methylPREDNISolone sodium succinate Injectable 60 milliGRAM(s) IV Push two times a day  methylPREDNISolone sodium succinate Injectable   IV Push   mycophenolate mofetil 1 Gram(s) Oral <User Schedule>  NIFEdipine XL 30 milliGRAM(s) Oral daily  nystatin    Suspension 033065 Unit(s) Swish and Swallow four times a day  polyethylene glycol 3350 17 Gram(s) Oral daily  repaglinide 1 milliGRAM(s) Oral three times a day before meals  senna 2 Tablet(s) Oral at bedtime  tacrolimus ER Tablet (ENVARSUS XR) 4 milliGRAM(s) Oral <User Schedule>  trimethoprim   80 mG/sulfamethoxazole 400 mG 1 Tablet(s) Oral daily  valGANciclovir 450 milliGRAM(s) Oral <User Schedule>    MEDICATIONS  (PRN):  dextrose Oral Gel 15 Gram(s) Oral once PRN Blood Glucose LESS THAN 70 milliGRAM(s)/deciliter  ondansetron Injectable 4 milliGRAM(s) IV Push every 6 hours PRN Nausea and/or Vomiting  traMADol 25 milliGRAM(s) Oral every 6 hours PRN Moderate Pain (4 - 6)  traMADol 50 milliGRAM(s) Oral every 6 hours PRN Severe Pain (7 - 10)      PAST MEDICAL & SURGICAL HISTORY:  DM (diabetes mellitus)  HTN (hypertension)  Chronic kidney disease, unspecified CKD stage  ESRD on dialysis  Pneumonia  Sepsis  Anemia  No significant past surgical history          Vital Signs Last 24 Hrs  T(C): 36.6 (27 May 2023 13:00), Max: 36.8 (26 May 2023 21:00)  T(F): 97.9 (27 May 2023 13:00), Max: 98.2 (26 May 2023 21:00)  HR: 82 (27 May 2023 13:00) (82 - 102)  BP: 127/72 (27 May 2023 13:00) (127/58 - 133/63)  BP(mean): 90 (27 May 2023 05:38) (90 - 90)  RR: 18 (27 May 2023 13:00) (16 - 18)  SpO2: 94% (27 May 2023 13:00) (94% - 98%)    Parameters below as of 27 May 2023 13:00  Patient On (Oxygen Delivery Method): room air        I&O's Summary    26 May 2023 07:01  -  27 May 2023 07:00  --------------------------------------------------------  IN: 2110 mL / OUT: 818.5 mL / NET: 1291.5 mL    27 May 2023 07:01  -  27 May 2023 14:36  --------------------------------------------------------  IN: 720 mL / OUT: 210 mL / NET: 510 mL      Daily     Daily Weight in k.2 (27 May 2023 05:38)    Labs:                        9.6    15.96 )-----------( 346      ( 27 May 2023 04:39 )             30.0         132<L>  |  93<L>  |  60<H>  ----------------------------<  174<H>  4.5   |  20<L>  |  5.28<H>    Ca    8.6      27 May 2023 09:48  Phos  4.8       Mg     2.7         TPro  6.5  /  Alb  3.5  /  TBili  0.3  /  DBili  x   /  AST  32  /  ALT  6<L>  /  AlkPhos  125<H>        CAPILLARY BLOOD GLUCOSE      POCT Blood Glucose.: 221 mg/dL (27 May 2023 12:02)  POCT Blood Glucose.: 183 mg/dL (27 May 2023 08:45)  POCT Blood Glucose.: 322 mg/dL (27 May 2023 06:31)  POCT Blood Glucose.: 282 mg/dL (27 May 2023 05:54)  POCT Blood Glucose.: 260 mg/dL (26 May 2023 21:26)  POCT Blood Glucose.: 178 mg/dL (26 May 2023 17:06)    Tacrolimus (), Serum: 9.5 ng/mL ( @ 04:39)    COVID-19 PCR: NotDetec (25 May 2023 05:22)        Review of systems  All other systems were reviewed and are negative, except as noted.      PHYSICAL EXAM:  Constitutional: Well developed / well nourished  Eyes: Anicteric, PERRLA  ENMT: nc/at  Neck: Supple, trachea midline;   Respiratory: CTA B/L  Cardiovascular: RRR  Gastrointestinal: Soft, non distended, mild tenderness at the incision site; RLQ incision closed with minimal SS output c/d/i; PRABHU x2 SS   Genitourinary: Urinary catheter in place   Extremities: SCD's in place and working bilaterally, AVF   Vascular: Palpable dp pulses bilaterally  Neurological: A&O x3  Skin: no rashes, ulcerations or lesions;  Musculoskeletal: Moving all extremities  Psychiatric: Responsive     Transplant Surgery - Multidisciplinary Rounds  --------------------------------------------------------------  DDRT (1a/1v/1u + stent)     POD#2    HPI: DARIEN MCGINNIS is a 68 year old male PMH HTN, 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 Patient last received HD . He makes a very small amount of urine.    Present: Patient seen and examined with multidisciplinary team including Transplant Surgeon: Dr. Wade,  Transplant Nephrologist: Dr. Jessica Garcia, FRANCINE Bernal  and unit RN during am rounds.  Disciplines not in attendance will be notified of the plan.     Interval Events:  - POD#2 DDRT  - Making about 20-30cc/hr UOP   - Diet advanced, off mIVF  - HTN, started PO Nifedipine 30mg QD with improvement in BP control    Immunosuppression:   Induction:  Simulect                                        Maintenance Immunosuppression: Env by level, MMF 1g BID, SST   Ongoing monitoring for signs of rejection.    Potential Discharge date: Pending clinical course   Education:  Medications  Plan of care:  See Below    MEDICATIONS  (STANDING):  basiliximab  IVPB 20 milliGRAM(s) IV Intermittent once  calcitriol   Capsule 0.25 MICROGram(s) Oral daily  chlorhexidine 2% Cloths 1 Application(s) Topical daily  dextrose 5%. 1000 milliLiter(s) (100 mL/Hr) IV Continuous <Continuous>  dextrose 5%. 1000 milliLiter(s) (50 mL/Hr) IV Continuous <Continuous>  dextrose 50% Injectable 25 Gram(s) IV Push once  dextrose 50% Injectable 12.5 Gram(s) IV Push once  dextrose 50% Injectable 25 Gram(s) IV Push once  famotidine    Tablet 20 milliGRAM(s) Oral daily  gabapentin 100 milliGRAM(s) Oral two times a day  glucagon  Injectable 1 milliGRAM(s) IntraMuscular once  insulin lispro (ADMELOG) corrective regimen sliding scale   SubCutaneous at bedtime  insulin lispro (ADMELOG) corrective regimen sliding scale   SubCutaneous three times a day before meals  methylPREDNISolone sodium succinate Injectable 60 milliGRAM(s) IV Push two times a day  methylPREDNISolone sodium succinate Injectable   IV Push   mycophenolate mofetil 1 Gram(s) Oral <User Schedule>  NIFEdipine XL 30 milliGRAM(s) Oral daily  nystatin    Suspension 145369 Unit(s) Swish and Swallow four times a day  polyethylene glycol 3350 17 Gram(s) Oral daily  repaglinide 1 milliGRAM(s) Oral three times a day before meals  senna 2 Tablet(s) Oral at bedtime  tacrolimus ER Tablet (ENVARSUS XR) 4 milliGRAM(s) Oral <User Schedule>  trimethoprim   80 mG/sulfamethoxazole 400 mG 1 Tablet(s) Oral daily  valGANciclovir 450 milliGRAM(s) Oral <User Schedule>    MEDICATIONS  (PRN):  dextrose Oral Gel 15 Gram(s) Oral once PRN Blood Glucose LESS THAN 70 milliGRAM(s)/deciliter  ondansetron Injectable 4 milliGRAM(s) IV Push every 6 hours PRN Nausea and/or Vomiting  traMADol 25 milliGRAM(s) Oral every 6 hours PRN Moderate Pain (4 - 6)  traMADol 50 milliGRAM(s) Oral every 6 hours PRN Severe Pain (7 - 10)      PAST MEDICAL & SURGICAL HISTORY:  DM (diabetes mellitus)  HTN (hypertension)  Chronic kidney disease, unspecified CKD stage  ESRD on dialysis  Pneumonia  Sepsis  Anemia  No significant past surgical history          Vital Signs Last 24 Hrs  T(C): 36.6 (27 May 2023 13:00), Max: 36.8 (26 May 2023 21:00)  T(F): 97.9 (27 May 2023 13:00), Max: 98.2 (26 May 2023 21:00)  HR: 82 (27 May 2023 13:00) (82 - 102)  BP: 127/72 (27 May 2023 13:00) (127/58 - 133/63)  BP(mean): 90 (27 May 2023 05:38) (90 - 90)  RR: 18 (27 May 2023 13:00) (16 - 18)  SpO2: 94% (27 May 2023 13:00) (94% - 98%)    Parameters below as of 27 May 2023 13:00  Patient On (Oxygen Delivery Method): room air        I&O's Summary    26 May 2023 07:01  -  27 May 2023 07:00  --------------------------------------------------------  IN: 2110 mL / OUT: 818.5 mL / NET: 1291.5 mL    27 May 2023 07:01  -  27 May 2023 14:36  --------------------------------------------------------  IN: 720 mL / OUT: 210 mL / NET: 510 mL      Daily     Daily Weight in k.2 (27 May 2023 05:38)    Labs:                        9.6    15.96 )-----------( 346      ( 27 May 2023 04:39 )             30.0         132<L>  |  93<L>  |  60<H>  ----------------------------<  174<H>  4.5   |  20<L>  |  5.28<H>    Ca    8.6      27 May 2023 09:48  Phos  4.8       Mg     2.7         TPro  6.5  /  Alb  3.5  /  TBili  0.3  /  DBili  x   /  AST  32  /  ALT  6<L>  /  AlkPhos  125<H>        CAPILLARY BLOOD GLUCOSE      POCT Blood Glucose.: 221 mg/dL (27 May 2023 12:02)  POCT Blood Glucose.: 183 mg/dL (27 May 2023 08:45)  POCT Blood Glucose.: 322 mg/dL (27 May 2023 06:31)  POCT Blood Glucose.: 282 mg/dL (27 May 2023 05:54)  POCT Blood Glucose.: 260 mg/dL (26 May 2023 21:26)  POCT Blood Glucose.: 178 mg/dL (26 May 2023 17:06)    Tacrolimus (), Serum: 9.5 ng/mL ( @ 04:39)    COVID-19 PCR: NotDetec (25 May 2023 05:22)        Review of systems  All other systems were reviewed and are negative, except as noted.      PHYSICAL EXAM:  Constitutional: Well developed / well nourished  Eyes: Anicteric, PERRLA  ENMT: nc/at  Neck: Supple, trachea midline;   Respiratory: CTA B/L  Cardiovascular: RRR  Gastrointestinal: Soft, non distended, mild tenderness at the incision site; RLQ incision closed with minimal SS output c/d/i; PRABHU x2 SS   Genitourinary: Urinary catheter in place   Extremities: SCD's in place and working bilaterally, AVF   Vascular: Palpable dp pulses bilaterally  Neurological: A&O x3  Skin: no rashes, ulcerations or lesions;  Musculoskeletal: Moving all extremities  Psychiatric: Responsive

## 2023-05-27 NOTE — PROGRESS NOTE ADULT - ASSESSMENT
68 year old male with  ESRD on HD, DM, HTN, and anemia presenting for kidney transplant.   Donor- KDPI 96%, Terminal Cr 0.8  Last IHD was on 5/24/23    PLAN  1.s/p DDRT on 5/24/23- Allograft function with ~700 cc UOP in the last 24 hrs. Cr up from 4.8 to 5.5 today. but no urgent need for dialysis. will given one dose of Lasix 80 mg IV today   2. IS meds- Simulect Induction .Dosing tac by level. goal level 8-10 . MMF 1gm po bid and steroid taper per protocol  3. Hyperkalemia- K is 5.6 , will shift with insulin/dextrose and give one dose of lasix 80 mg IV. repeat K later today .   4. HTN - started on Nifedipine 30 mg po daily, will monitor BP and titrate meds accordingly.   5. Hypocalcemia Calcitriol was resumed.    68 year old male with  ESRD on HD, DM, HTN, and anemia presenting for kidney transplant.   Donor- KDPI 96%, Terminal Cr 0.8  Last IHD was on 5/24/23    PLAN  1.s/p DDRT on 5/24/23- Allograft function with ~700 cc UOP in the last 24 hrs. Cr is up from 4.8 to 5.5 today. but no urgent need for dialysis.   2. IS meds- Simulect Induction .Dosing tac by level. goal level 8-10 . MMF 1gm po bid and steroid taper per protocol  3. Hyperkalemia- K is 5.6 , now resolved  4. HTN - started on Nifedipine 30 mg po daily, will monitor BP and titrate meds accordingly.   5. Hypocalcemia Calcitriol was resumed.

## 2023-05-27 NOTE — PROGRESS NOTE ADULT - SUBJECTIVE AND OBJECTIVE BOX
[facility] DIVISION OF KIDNEY DISEASES AND HYPERTENSION -- FOLLOW UP NOTE  --------------------------------------------------------------------------------  Chief Complaint:    24 hour events/subjective:        PAST HISTORY  --------------------------------------------------------------------------------  No significant changes to PMH, PSH, FHx, SHx, unless otherwise noted    ALLERGIES & MEDICATIONS  --------------------------------------------------------------------------------  Allergies    No Known Allergies    Intolerances      Standing Inpatient Medications  basiliximab  IVPB 20 milliGRAM(s) IV Intermittent once  calcitriol   Capsule 0.25 MICROGram(s) Oral daily  chlorhexidine 2% Cloths 1 Application(s) Topical daily  dextrose 5%. 1000 milliLiter(s) IV Continuous <Continuous>  dextrose 5%. 1000 milliLiter(s) IV Continuous <Continuous>  dextrose 50% Injectable 12.5 Gram(s) IV Push once  dextrose 50% Injectable 25 Gram(s) IV Push once  dextrose 50% Injectable 25 Gram(s) IV Push once  famotidine    Tablet 20 milliGRAM(s) Oral daily  gabapentin 100 milliGRAM(s) Oral two times a day  glucagon  Injectable 1 milliGRAM(s) IntraMuscular once  insulin lispro (ADMELOG) corrective regimen sliding scale   SubCutaneous at bedtime  insulin lispro (ADMELOG) corrective regimen sliding scale   SubCutaneous three times a day before meals  methylPREDNISolone sodium succinate Injectable 60 milliGRAM(s) IV Push two times a day  methylPREDNISolone sodium succinate Injectable   IV Push   mycophenolate mofetil 1 Gram(s) Oral <User Schedule>  NIFEdipine XL 30 milliGRAM(s) Oral daily  nystatin    Suspension 140306 Unit(s) Swish and Swallow four times a day  polyethylene glycol 3350 17 Gram(s) Oral daily  repaglinide 1 milliGRAM(s) Oral three times a day before meals  senna 2 Tablet(s) Oral at bedtime  tacrolimus ER Tablet (ENVARSUS XR) 4 milliGRAM(s) Oral <User Schedule>  trimethoprim   80 mG/sulfamethoxazole 400 mG 1 Tablet(s) Oral daily  valGANciclovir 450 milliGRAM(s) Oral <User Schedule>    PRN Inpatient Medications  dextrose Oral Gel 15 Gram(s) Oral once PRN  ondansetron Injectable 4 milliGRAM(s) IV Push every 6 hours PRN  traMADol 50 milliGRAM(s) Oral every 6 hours PRN  traMADol 25 milliGRAM(s) Oral every 6 hours PRN      REVIEW OF SYSTEMS  --------------------------------------------------------------------------------  Gen: No fatigue, fevers/chills, weakness  Skin: No rashes  Head/Eyes/Ears/Mouth: No headache;No sore throat  Respiratory: No dyspnea, cough,   CV: No chest pain, PND, orthopnea  GI: No abdominal pain, diarrhea, constipation, nausea, vomiting  Transplant: No pain  : No increased frequency, dysuria, hematuria, nocturia  MSK: No joint pain/swelling; no back pain; no edema  Neuro: No dizziness/lightheadedness, weakness, seizures, numbness, tingling  Psych: No significant nervousness, anxiety, stress, depression    All other systems were reviewed and are negative, except as noted.    VITALS/PHYSICAL EXAM  --------------------------------------------------------------------------------  T(C): 36.6 (05-27-23 @ 09:00), Max: 37.1 (05-26-23 @ 13:00)  HR: 88 (05-27-23 @ 09:00) (88 - 105)  BP: 130/71 (05-27-23 @ 09:00) (127/58 - 140/64)  RR: 18 (05-27-23 @ 09:00) (16 - 18)  SpO2: 96% (05-27-23 @ 09:00) (96% - 98%)  Wt(kg): --  Height (cm): 160 (05-25-23 @ 12:12)  Weight (kg): 59.7 (05-25-23 @ 12:12)  BMI (kg/m2): 23.3 (05-25-23 @ 12:12)  BSA (m2): 1.62 (05-25-23 @ 12:12)      05-26-23 @ 07:01  -  05-27-23 @ 07:00  --------------------------------------------------------  IN: 2110 mL / OUT: 818.5 mL / NET: 1291.5 mL      Physical Exam:  	Gen: NAD  	HEENT: PERRL, supple neck, clear oropharynx  	Pulm: CTA B/L  	CV: RRR, S1S2; no rub  	Back: No spinal or CVA tenderness; no sacral edema  	Abd: +BS, soft, nontender/nondistended                      Transplant: No tenderness, swelling  	: No suprapubic tenderness  	UE: Warm, FROM; no edema; no asterixis  	LE: Warm, FROM; no edema  	Neuro: No focal deficits  	Psych: Normal affect and mood  	Skin: Warm, without rashes      LABS/STUDIES  --------------------------------------------------------------------------------              9.6    15.96 >-----------<  346      [05-27-23 @ 04:39]              30.0     132  |  95  |  59  ----------------------------<  264      [05-27-23 @ 04:38]  5.6   |  20  |  5.54        Ca     8.3     [05-27-23 @ 04:38]      Mg     2.7     [05-27-23 @ 04:38]      Phos  4.8     [05-27-23 @ 04:38]    TPro  6.5  /  Alb  3.5  /  TBili  0.3  /  DBili  x   /  AST  32  /  ALT  6   /  AlkPhos  125  [05-27-23 @ 04:38]        Creatinine Trend:  SCr 5.54 [05-27 @ 04:38]  SCr 4.81 [05-26 @ 06:37]  SCr 4.85 [05-25 @ 23:55]  SCr 4.87 [05-25 @ 20:45]  SCr 4.76 [05-25 @ 16:17]    Tacrolimus (), Serum: 6.1 ng/mL (05-26 @ 06:34)                  HBsAb 40.3      [05-25-23 @ 05:10]  HBsAg Nonreact      [05-25-23 @ 05:10]  HBcAb Nonreact      [05-25-23 @ 05:10]  HCV 0.16, Nonreact      [05-25-23 @ 05:10]  HIV Nonreact      [05-25-23 @ 05:10]       [facility] DIVISION OF KIDNEY DISEASES AND HYPERTENSION -- FOLLOW UP NOTE  --------------------------------------------------------------------------------  Chief Complaint:    24 hour events/subjective:  Patient was seen and examined at bedside  no overnight events       PAST HISTORY  --------------------------------------------------------------------------------  No significant changes to PMH, PSH, FHx, SHx, unless otherwise noted    ALLERGIES & MEDICATIONS  --------------------------------------------------------------------------------  Allergies    No Known Allergies    Intolerances      Standing Inpatient Medications  basiliximab  IVPB 20 milliGRAM(s) IV Intermittent once  calcitriol   Capsule 0.25 MICROGram(s) Oral daily  chlorhexidine 2% Cloths 1 Application(s) Topical daily  dextrose 5%. 1000 milliLiter(s) IV Continuous <Continuous>  dextrose 5%. 1000 milliLiter(s) IV Continuous <Continuous>  dextrose 50% Injectable 12.5 Gram(s) IV Push once  dextrose 50% Injectable 25 Gram(s) IV Push once  dextrose 50% Injectable 25 Gram(s) IV Push once  famotidine    Tablet 20 milliGRAM(s) Oral daily  gabapentin 100 milliGRAM(s) Oral two times a day  glucagon  Injectable 1 milliGRAM(s) IntraMuscular once  insulin lispro (ADMELOG) corrective regimen sliding scale   SubCutaneous at bedtime  insulin lispro (ADMELOG) corrective regimen sliding scale   SubCutaneous three times a day before meals  methylPREDNISolone sodium succinate Injectable 60 milliGRAM(s) IV Push two times a day  methylPREDNISolone sodium succinate Injectable   IV Push   mycophenolate mofetil 1 Gram(s) Oral <User Schedule>  NIFEdipine XL 30 milliGRAM(s) Oral daily  nystatin    Suspension 962333 Unit(s) Swish and Swallow four times a day  polyethylene glycol 3350 17 Gram(s) Oral daily  repaglinide 1 milliGRAM(s) Oral three times a day before meals  senna 2 Tablet(s) Oral at bedtime  tacrolimus ER Tablet (ENVARSUS XR) 4 milliGRAM(s) Oral <User Schedule>  trimethoprim   80 mG/sulfamethoxazole 400 mG 1 Tablet(s) Oral daily  valGANciclovir 450 milliGRAM(s) Oral <User Schedule>    PRN Inpatient Medications  dextrose Oral Gel 15 Gram(s) Oral once PRN  ondansetron Injectable 4 milliGRAM(s) IV Push every 6 hours PRN  traMADol 50 milliGRAM(s) Oral every 6 hours PRN  traMADol 25 milliGRAM(s) Oral every 6 hours PRN      REVIEW OF SYSTEMS  --------------------------------------------------------------------------------  Gen: No fatigue, fevers/chills, weakness  Skin: No rashes  Head/Eyes/Ears/Mouth: No headache;No sore throat  Respiratory: No dyspnea, cough,   CV: No chest pain, PND, orthopnea  GI: No abdominal pain, diarrhea, constipation, nausea, vomiting  Transplant: No pain  : No increased frequency, dysuria, hematuria, nocturia  MSK: No joint pain/swelling; no back pain; no edema  Neuro: No dizziness/lightheadedness, weakness, seizures, numbness, tingling  Psych: No significant nervousness, anxiety, stress, depression    All other systems were reviewed and are negative, except as noted.    VITALS/PHYSICAL EXAM  --------------------------------------------------------------------------------  T(C): 36.6 (05-27-23 @ 09:00), Max: 37.1 (05-26-23 @ 13:00)  HR: 88 (05-27-23 @ 09:00) (88 - 105)  BP: 130/71 (05-27-23 @ 09:00) (127/58 - 140/64)  RR: 18 (05-27-23 @ 09:00) (16 - 18)  SpO2: 96% (05-27-23 @ 09:00) (96% - 98%)  Wt(kg): --  Height (cm): 160 (05-25-23 @ 12:12)  Weight (kg): 59.7 (05-25-23 @ 12:12)  BMI (kg/m2): 23.3 (05-25-23 @ 12:12)  BSA (m2): 1.62 (05-25-23 @ 12:12)      05-26-23 @ 07:01  -  05-27-23 @ 07:00  --------------------------------------------------------  IN: 2110 mL / OUT: 818.5 mL / NET: 1291.5 mL      Physical Exam:  	Gen: NAD  	HEENT: PERRL, supple neck, clear oropharynx  	Pulm: CTA B/L  	CV: RRR, S1S2; no rub  	Back: No spinal or CVA tenderness; no sacral edema  	Abd: +BS, soft, nontender/nondistended                      Transplant: No tenderness, swelling  	: No suprapubic tenderness  	UE: Warm, FROM; no edema; no asterixis  	LE: Warm, FROM; no edema  	Neuro: No focal deficits  	Psych: Normal affect and mood  	Skin: Warm, without rashes      LABS/STUDIES  --------------------------------------------------------------------------------              9.6    15.96 >-----------<  346      [05-27-23 @ 04:39]              30.0     132  |  95  |  59  ----------------------------<  264      [05-27-23 @ 04:38]  5.6   |  20  |  5.54        Ca     8.3     [05-27-23 @ 04:38]      Mg     2.7     [05-27-23 @ 04:38]      Phos  4.8     [05-27-23 @ 04:38]    TPro  6.5  /  Alb  3.5  /  TBili  0.3  /  DBili  x   /  AST  32  /  ALT  6   /  AlkPhos  125  [05-27-23 @ 04:38]        Creatinine Trend:  SCr 5.54 [05-27 @ 04:38]  SCr 4.81 [05-26 @ 06:37]  SCr 4.85 [05-25 @ 23:55]  SCr 4.87 [05-25 @ 20:45]  SCr 4.76 [05-25 @ 16:17]    Tacrolimus (), Serum: 6.1 ng/mL (05-26 @ 06:34)                  HBsAb 40.3      [05-25-23 @ 05:10]  HBsAg Nonreact      [05-25-23 @ 05:10]  HBcAb Nonreact      [05-25-23 @ 05:10]  HCV 0.16, Nonreact      [05-25-23 @ 05:10]  HIV Nonreact      [05-25-23 @ 05:10]

## 2023-05-27 NOTE — PROGRESS NOTE ADULT - SUBJECTIVE AND OBJECTIVE BOX
Patient is a 68y Male whom presented to the hospital with esrd on hd    PAST MEDICAL & SURGICAL HISTORY:  DM (diabetes mellitus)      HTN (hypertension)      Chronic kidney disease, unspecified CKD stage      ESRD on dialysis      Pneumonia      Sepsis      Anemia      No significant past surgical history          MEDICATIONS  (STANDING):  basiliximab  IVPB 20 milliGRAM(s) IV Intermittent once  calcitriol   Capsule 0.25 MICROGram(s) Oral daily  chlorhexidine 2% Cloths 1 Application(s) Topical daily  dextrose 5%. 1000 milliLiter(s) (100 mL/Hr) IV Continuous <Continuous>  dextrose 5%. 1000 milliLiter(s) (50 mL/Hr) IV Continuous <Continuous>  dextrose 50% Injectable 25 Gram(s) IV Push once  dextrose 50% Injectable 12.5 Gram(s) IV Push once  dextrose 50% Injectable 25 Gram(s) IV Push once  famotidine    Tablet 20 milliGRAM(s) Oral daily  gabapentin 100 milliGRAM(s) Oral two times a day  glucagon  Injectable 1 milliGRAM(s) IntraMuscular once  insulin lispro (ADMELOG) corrective regimen sliding scale   SubCutaneous three times a day before meals  insulin lispro (ADMELOG) corrective regimen sliding scale   SubCutaneous at bedtime  methylPREDNISolone sodium succinate Injectable   IV Push   mycophenolate mofetil 1 Gram(s) Oral <User Schedule>  NIFEdipine XL 30 milliGRAM(s) Oral daily  nystatin    Suspension 773107 Unit(s) Swish and Swallow four times a day  polyethylene glycol 3350 17 Gram(s) Oral daily  repaglinide 1 milliGRAM(s) Oral three times a day before meals  senna 2 Tablet(s) Oral at bedtime  trimethoprim   80 mG/sulfamethoxazole 400 mG 1 Tablet(s) Oral daily  valGANciclovir 450 milliGRAM(s) Oral <User Schedule>      Allergies    No Known Allergies    Intolerances        SOCIAL HISTORY:  Denies ETOh,Smoking,     FAMILY HISTORY:  No pertinent family history in first degree relatives        REVIEW OF SYSTEMS:    CONSTITUTIONAL: No weakness, fevers or chills  EYES/ENT: No visual changes;  no throat pain   NECK: No pain or stiffness  RESPIRATORY: No cough, wheezing, hemoptysis; No shortness of breath  CARDIOVASCULAR: No chest pain or palpitations  GASTROINTESTINAL: No abdominal or epigastric pain. No nausea, vomiting,     No diarrhea or constipation. No melena   GENITOURINARY: No dysuria, frequency or hematuria  NEUROLOGICAL: No numbness or weakness  SKIN: dry                          9.6    15.96 )-----------( 346      ( 27 May 2023 04:39 )             30.0       CBC Full  -  ( 27 May 2023 04:39 )  WBC Count : 15.96 K/uL  RBC Count : 2.98 M/uL  Hemoglobin : 9.6 g/dL  Hematocrit : 30.0 %  Platelet Count - Automated : 346 K/uL  Mean Cell Volume : 100.7 fl  Mean Cell Hemoglobin : 32.2 pg  Mean Cell Hemoglobin Concentration : 32.0 gm/dL  Auto Neutrophil # : 13.98 K/uL  Auto Lymphocyte # : 0.86 K/uL  Auto Monocyte # : 0.99 K/uL  Auto Eosinophil # : 0.00 K/uL  Auto Basophil # : 0.01 K/uL  Auto Neutrophil % : 87.5 %  Auto Lymphocyte % : 5.4 %  Auto Monocyte % : 6.2 %  Auto Eosinophil % : 0.0 %  Auto Basophil % : 0.1 %      05-27    132<L>  |  93<L>  |  60<H>  ----------------------------<  174<H>  4.5   |  20<L>  |  5.28<H>    Ca    8.6      27 May 2023 09:48  Phos  4.8     05-27  Mg     2.7     05-27    TPro  6.5  /  Alb  3.5  /  TBili  0.3  /  DBili  x   /  AST  32  /  ALT  6<L>  /  AlkPhos  125<H>  05-27      CAPILLARY BLOOD GLUCOSE      POCT Blood Glucose.: 221 mg/dL (27 May 2023 12:02)  POCT Blood Glucose.: 183 mg/dL (27 May 2023 08:45)  POCT Blood Glucose.: 322 mg/dL (27 May 2023 06:31)  POCT Blood Glucose.: 282 mg/dL (27 May 2023 05:54)  POCT Blood Glucose.: 260 mg/dL (26 May 2023 21:26)  POCT Blood Glucose.: 178 mg/dL (26 May 2023 17:06)      Vital Signs Last 24 Hrs  T(C): 36.6 (27 May 2023 13:00), Max: 36.8 (26 May 2023 21:00)  T(F): 97.9 (27 May 2023 13:00), Max: 98.2 (26 May 2023 21:00)  HR: 82 (27 May 2023 13:00) (82 - 102)  BP: 127/72 (27 May 2023 13:00) (127/58 - 133/63)  BP(mean): 90 (27 May 2023 05:38) (90 - 90)  RR: 18 (27 May 2023 13:00) (16 - 18)  SpO2: 94% (27 May 2023 13:00) (94% - 98%)    Parameters below as of 27 May 2023 13:00  Patient On (Oxygen Delivery Method): room air                    PHYSICAL EXAM:    Constitutional: NAD  HEENT: conjunctive   clear   Neck:  No JVD  Respiratory: CTAB  Cardiovascular: S1 and S2  Gastrointestinal: BS+, soft, NT/ND  Extremities: No peripheral edema  Neurological: A/O x 3, no focal deficits  Psychiatric: Normal mood, normal affect  : pos  Mills  Skin: No rashes  Access: Not applicable

## 2023-05-27 NOTE — PROGRESS NOTE ADULT - ASSESSMENT
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 Patient last received HD Wednesday 5/24. He makes a very small amount of urine. now s/p DDRT (1a/1v/1u + stent) with Simulect induction on 5/25/23.     #S/P DDRT (1a/1v/1u + stent)  - Txp US unable to visualize anastomosis but visualized flow within kidney itself  - Off mIVF, CC/Renal diet  - K 5.4 --> treated medically this AM, repeat pending  - Strict I&Os, PRABHU griffith x2  - PRN pain regimen  - Bowel regimen  - OOBTC, ambulate in hallway    #Immunosuppression  - Simulect induction, next dose on POD#4  - Env by level, MMF 1g BID, SST  - PPx: Valcyte, Bactrim, Nystatin, Pepcid    #HTN  - Continue Nifedipine 30mg QD  - Increase as indicated for SBP >170    #NIDDM  - Continue Prandin 1mg TID with meals  - ISS  - Home meds: Prandin 0.5mg BID

## 2023-05-28 LAB
ALBUMIN SERPL ELPH-MCNC: 3.3 G/DL — SIGNIFICANT CHANGE UP (ref 3.3–5)
ALP SERPL-CCNC: 119 U/L — SIGNIFICANT CHANGE UP (ref 40–120)
ALT FLD-CCNC: <5 U/L — LOW (ref 10–45)
ANION GAP SERPL CALC-SCNC: 18 MMOL/L — HIGH (ref 5–17)
AST SERPL-CCNC: 20 U/L — SIGNIFICANT CHANGE UP (ref 10–40)
BASOPHILS # BLD AUTO: 0.01 K/UL — SIGNIFICANT CHANGE UP (ref 0–0.2)
BASOPHILS NFR BLD AUTO: 0.1 % — SIGNIFICANT CHANGE UP (ref 0–2)
BILIRUB SERPL-MCNC: 0.3 MG/DL — SIGNIFICANT CHANGE UP (ref 0.2–1.2)
BUN SERPL-MCNC: 70 MG/DL — HIGH (ref 7–23)
CALCIUM SERPL-MCNC: 8.1 MG/DL — LOW (ref 8.4–10.5)
CHLORIDE SERPL-SCNC: 95 MMOL/L — LOW (ref 96–108)
CO2 SERPL-SCNC: 20 MMOL/L — LOW (ref 22–31)
CREAT SERPL-MCNC: 5.08 MG/DL — HIGH (ref 0.5–1.3)
EGFR: 12 ML/MIN/1.73M2 — LOW
EOSINOPHIL # BLD AUTO: 0 K/UL — SIGNIFICANT CHANGE UP (ref 0–0.5)
EOSINOPHIL NFR BLD AUTO: 0 % — SIGNIFICANT CHANGE UP (ref 0–6)
GLUCOSE BLDC GLUCOMTR-MCNC: 114 MG/DL — HIGH (ref 70–99)
GLUCOSE BLDC GLUCOMTR-MCNC: 262 MG/DL — HIGH (ref 70–99)
GLUCOSE BLDC GLUCOMTR-MCNC: 304 MG/DL — HIGH (ref 70–99)
GLUCOSE BLDC GLUCOMTR-MCNC: 322 MG/DL — HIGH (ref 70–99)
GLUCOSE SERPL-MCNC: 261 MG/DL — HIGH (ref 70–99)
HCT VFR BLD CALC: 27.3 % — LOW (ref 39–50)
HGB BLD-MCNC: 9.1 G/DL — LOW (ref 13–17)
IMM GRANULOCYTES NFR BLD AUTO: 1 % — HIGH (ref 0–0.9)
LYMPHOCYTES # BLD AUTO: 0.59 K/UL — LOW (ref 1–3.3)
LYMPHOCYTES # BLD AUTO: 5 % — LOW (ref 13–44)
MAGNESIUM SERPL-MCNC: 2.9 MG/DL — HIGH (ref 1.6–2.6)
MCHC RBC-ENTMCNC: 33 PG — SIGNIFICANT CHANGE UP (ref 27–34)
MCHC RBC-ENTMCNC: 33.3 GM/DL — SIGNIFICANT CHANGE UP (ref 32–36)
MCV RBC AUTO: 98.9 FL — SIGNIFICANT CHANGE UP (ref 80–100)
MONOCYTES # BLD AUTO: 0.65 K/UL — SIGNIFICANT CHANGE UP (ref 0–0.9)
MONOCYTES NFR BLD AUTO: 5.5 % — SIGNIFICANT CHANGE UP (ref 2–14)
NEUTROPHILS # BLD AUTO: 10.39 K/UL — HIGH (ref 1.8–7.4)
NEUTROPHILS NFR BLD AUTO: 88.4 % — HIGH (ref 43–77)
NRBC # BLD: 0 /100 WBCS — SIGNIFICANT CHANGE UP (ref 0–0)
PHOSPHATE SERPL-MCNC: 4.9 MG/DL — HIGH (ref 2.5–4.5)
PLATELET # BLD AUTO: 331 K/UL — SIGNIFICANT CHANGE UP (ref 150–400)
POTASSIUM SERPL-MCNC: 4.9 MMOL/L — SIGNIFICANT CHANGE UP (ref 3.5–5.3)
POTASSIUM SERPL-SCNC: 4.9 MMOL/L — SIGNIFICANT CHANGE UP (ref 3.5–5.3)
PROT SERPL-MCNC: 6 G/DL — SIGNIFICANT CHANGE UP (ref 6–8.3)
RBC # BLD: 2.76 M/UL — LOW (ref 4.2–5.8)
RBC # FLD: 16.6 % — HIGH (ref 10.3–14.5)
SODIUM SERPL-SCNC: 133 MMOL/L — LOW (ref 135–145)
TACROLIMUS SERPL-MCNC: 5.9 NG/ML — SIGNIFICANT CHANGE UP
WBC # BLD: 11.76 K/UL — HIGH (ref 3.8–10.5)
WBC # FLD AUTO: 11.76 K/UL — HIGH (ref 3.8–10.5)

## 2023-05-28 PROCEDURE — 99232 SBSQ HOSP IP/OBS MODERATE 35: CPT | Mod: 24

## 2023-05-28 PROCEDURE — 99232 SBSQ HOSP IP/OBS MODERATE 35: CPT

## 2023-05-28 RX ORDER — INSULIN LISPRO 100/ML
5 VIAL (ML) SUBCUTANEOUS
Refills: 0 | Status: DISCONTINUED | OUTPATIENT
Start: 2023-05-28 | End: 2023-05-30

## 2023-05-28 RX ORDER — TACROLIMUS 5 MG/1
5 CAPSULE ORAL
Refills: 0 | Status: DISCONTINUED | OUTPATIENT
Start: 2023-05-29 | End: 2023-05-29

## 2023-05-28 RX ORDER — TACROLIMUS 5 MG/1
1 CAPSULE ORAL ONCE
Refills: 0 | Status: COMPLETED | OUTPATIENT
Start: 2023-05-28 | End: 2023-05-28

## 2023-05-28 RX ADMIN — Medication 8: at 12:47

## 2023-05-28 RX ADMIN — TRAMADOL HYDROCHLORIDE 50 MILLIGRAM(S): 50 TABLET ORAL at 00:09

## 2023-05-28 RX ADMIN — Medication 30 MILLIGRAM(S): at 05:22

## 2023-05-28 RX ADMIN — CHLORHEXIDINE GLUCONATE 1 APPLICATION(S): 213 SOLUTION TOPICAL at 12:49

## 2023-05-28 RX ADMIN — REPAGLINIDE 1 MILLIGRAM(S): 1 TABLET ORAL at 12:47

## 2023-05-28 RX ADMIN — TACROLIMUS 1 MILLIGRAM(S): 5 CAPSULE ORAL at 12:50

## 2023-05-28 RX ADMIN — GABAPENTIN 100 MILLIGRAM(S): 400 CAPSULE ORAL at 05:22

## 2023-05-28 RX ADMIN — Medication 1 TABLET(S): at 12:48

## 2023-05-28 RX ADMIN — POLYETHYLENE GLYCOL 3350 17 GRAM(S): 17 POWDER, FOR SOLUTION ORAL at 12:48

## 2023-05-28 RX ADMIN — Medication 5 UNIT(S): at 12:54

## 2023-05-28 RX ADMIN — TACROLIMUS 4 MILLIGRAM(S): 5 CAPSULE ORAL at 08:26

## 2023-05-28 RX ADMIN — TRAMADOL HYDROCHLORIDE 50 MILLIGRAM(S): 50 TABLET ORAL at 23:44

## 2023-05-28 RX ADMIN — SENNA PLUS 2 TABLET(S): 8.6 TABLET ORAL at 21:31

## 2023-05-28 RX ADMIN — REPAGLINIDE 1 MILLIGRAM(S): 1 TABLET ORAL at 08:25

## 2023-05-28 RX ADMIN — Medication 5 UNIT(S): at 17:31

## 2023-05-28 RX ADMIN — Medication 500000 UNIT(S): at 12:48

## 2023-05-28 RX ADMIN — FAMOTIDINE 20 MILLIGRAM(S): 10 INJECTION INTRAVENOUS at 12:48

## 2023-05-28 RX ADMIN — Medication 500000 UNIT(S): at 05:22

## 2023-05-28 RX ADMIN — Medication 6: at 17:30

## 2023-05-28 RX ADMIN — REPAGLINIDE 1 MILLIGRAM(S): 1 TABLET ORAL at 17:31

## 2023-05-28 RX ADMIN — CALCITRIOL 0.25 MICROGRAM(S): 0.5 CAPSULE ORAL at 12:48

## 2023-05-28 RX ADMIN — GABAPENTIN 100 MILLIGRAM(S): 400 CAPSULE ORAL at 17:31

## 2023-05-28 RX ADMIN — Medication 500000 UNIT(S): at 23:45

## 2023-05-28 RX ADMIN — Medication 8: at 08:25

## 2023-05-28 RX ADMIN — Medication 500000 UNIT(S): at 17:31

## 2023-05-28 RX ADMIN — Medication 30 MILLIGRAM(S): at 17:30

## 2023-05-28 RX ADMIN — MYCOPHENOLATE MOFETIL 1 GRAM(S): 250 CAPSULE ORAL at 08:28

## 2023-05-28 RX ADMIN — MYCOPHENOLATE MOFETIL 1 GRAM(S): 250 CAPSULE ORAL at 21:32

## 2023-05-28 NOTE — PROGRESS NOTE ADULT - ASSESSMENT
68 year old male with  ESRD on HD, DM, HTN, and anemia presenting for kidney transplant.   Donor- KDPI 96%, Terminal Cr 0.8  Last IHD was on 5/24/23    PLAN  1.s/p DDRT on 5/24/23- Allograft function with good UOP and Cr is trending down  2. IS meds- Simulect Induction .Dosing tac by level. goal level 8-10 . MMF 1gm po bid and steroid taper per protocol  3. Hyperkalemia- resolved  4. HTN - on Nifedipine 30 mg po daily, will monitor BP and titrate meds accordingly.   5. Hypocalcemia Calcitriol was resumed.

## 2023-05-28 NOTE — PROGRESS NOTE ADULT - SUBJECTIVE AND OBJECTIVE BOX
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 Patient last received HD Wednesday 5/24. He makes a very small amount of urine.    Present: Patient seen and examined with multidisciplinary team including Transplant Surgeon: Dr. Wade,  Transplant Nephrologist: Dr. Jessica Garcia, FRANCINE Bernal  and unit RN during am rounds.  Disciplines not in attendance will be notified of the plan.     Interval Events:  - POD#3 DDRT  - UOP remains   - Hyperkalemic with K 5.6, treated medically w/ improvement to <5  - 30-40ml/hr  - JPs removed   - Ambulating in [facility] x1 this AM     Immunosuppression:   Induction:  Simulect                                        Maintenance Immunosuppression: Env by level, MMF 1g BID, SST   Ongoing monitoring for signs of rejection.    Potential Discharge date: Pending clinical course   Education:  Medications  Plan of care:  See Below      MEDICATIONS  (STANDING):  basiliximab  IVPB 20 milliGRAM(s) IV Intermittent once  calcitriol   Capsule 0.25 MICROGram(s) Oral daily  chlorhexidine 2% Cloths 1 Application(s) Topical daily  dextrose 5%. 1000 milliLiter(s) (100 mL/Hr) IV Continuous <Continuous>  dextrose 5%. 1000 milliLiter(s) (50 mL/Hr) IV Continuous <Continuous>  dextrose 50% Injectable 25 Gram(s) IV Push once  dextrose 50% Injectable 25 Gram(s) IV Push once  dextrose 50% Injectable 12.5 Gram(s) IV Push once  famotidine    Tablet 20 milliGRAM(s) Oral daily  gabapentin 100 milliGRAM(s) Oral two times a day  glucagon  Injectable 1 milliGRAM(s) IntraMuscular once  insulin lispro (ADMELOG) corrective regimen sliding scale   SubCutaneous three times a day before meals  insulin lispro (ADMELOG) corrective regimen sliding scale   SubCutaneous at bedtime  methylPREDNISolone sodium succinate Injectable 30 milliGRAM(s) IV Push two times a day  methylPREDNISolone sodium succinate Injectable   IV Push   mycophenolate mofetil 1 Gram(s) Oral <User Schedule>  NIFEdipine XL 30 milliGRAM(s) Oral daily  nystatin    Suspension 390908 Unit(s) Swish and Swallow four times a day  polyethylene glycol 3350 17 Gram(s) Oral daily  repaglinide 1 milliGRAM(s) Oral three times a day before meals  senna 2 Tablet(s) Oral at bedtime  tacrolimus ER Tablet (ENVARSUS XR) 4 milliGRAM(s) Oral <User Schedule>  trimethoprim   80 mG/sulfamethoxazole 400 mG 1 Tablet(s) Oral daily  valGANciclovir 450 milliGRAM(s) Oral <User Schedule>    MEDICATIONS  (PRN):  dextrose Oral Gel 15 Gram(s) Oral once PRN Blood Glucose LESS THAN 70 milliGRAM(s)/deciliter  ondansetron Injectable 4 milliGRAM(s) IV Push every 6 hours PRN Nausea and/or Vomiting  traMADol 25 milliGRAM(s) Oral every 6 hours PRN Moderate Pain (4 - 6)  traMADol 50 milliGRAM(s) Oral every 6 hours PRN Severe Pain (7 - 10)      PAST MEDICAL & SURGICAL HISTORY:  DM (diabetes mellitus)      HTN (hypertension)      Chronic kidney disease, unspecified CKD stage      ESRD on dialysis      Pneumonia      Sepsis      Anemia      No significant past surgical history          Vital Signs Last 24 Hrs  T(C): 36.9 (28 May 2023 05:02), Max: 36.9 (28 May 2023 00:23)  T(F): 98.4 (28 May 2023 05:02), Max: 98.5 (28 May 2023 00:23)  HR: 102 (28 May 2023 05:02) (82 - 104)  BP: 141/73 (28 May 2023 05:02) (114/55 - 151/71)  BP(mean): 100 (28 May 2023 05:02) (79 - 102)  RR: 18 (28 May 2023 05:02) (18 - 18)  SpO2: 98% (28 May 2023 05:02) (94% - 99%)    Parameters below as of 28 May 2023 05:02  Patient On (Oxygen Delivery Method): room air        I&O's Summary    27 May 2023 07:01  -  28 May 2023 07:00  --------------------------------------------------------  IN: 1440 mL / OUT: 925 mL / NET: 515 mL                              9.1    11.76 )-----------( 331      ( 28 May 2023 06:49 )             27.3     05-28    133<L>  |  95<L>  |  70<H>  ----------------------------<  261<H>  4.9   |  20<L>  |  5.08<H>    Ca    8.1<L>      28 May 2023 06:49  Phos  4.9     05-28  Mg     2.9     05-28    TPro  6.0  /  Alb  3.3  /  TBili  0.3  /  DBili  x   /  AST  20  /  ALT  <5<L>  /  AlkPhos  119  05-28    Tacrolimus (), Serum: 9.5 ng/mL (05-27 @ 04:39)                  Review of systems  All other systems were reviewed and are negative, except as noted.      PHYSICAL EXAM:  Constitutional: Well developed / well nourished  Eyes: Anicteric, PERRLA  ENMT: nc/at  Neck: Supple, trachea midline;   Respiratory: CTA B/L  Cardiovascular: RRR  Gastrointestinal: Soft, non distended, mild tenderness at the incision site; RLQ incision closed with minimal SS output c/d/i  Genitourinary: Urinary catheter in place   Extremities: SCD's in place and working bilaterally, AVF   Vascular: Palpable dp pulses bilaterally  Neurological: A&O x3  Skin: no rashes, ulcerations or lesions;  Musculoskeletal: Moving all extremities  Psychiatric: Responsive

## 2023-05-28 NOTE — PROGRESS NOTE ADULT - SUBJECTIVE AND OBJECTIVE BOX
Patient is a 68y Male whom presented to the hospital with esrd on hd    PAST MEDICAL & SURGICAL HISTORY:  DM (diabetes mellitus)      HTN (hypertension)      Chronic kidney disease, unspecified CKD stage      ESRD on dialysis      Pneumonia      Sepsis      Anemia      No significant past surgical history          MEDICATIONS  (STANDING):  basiliximab  IVPB 20 milliGRAM(s) IV Intermittent once  calcitriol   Capsule 0.25 MICROGram(s) Oral daily  chlorhexidine 2% Cloths 1 Application(s) Topical daily  dextrose 5%. 1000 milliLiter(s) (100 mL/Hr) IV Continuous <Continuous>  dextrose 5%. 1000 milliLiter(s) (50 mL/Hr) IV Continuous <Continuous>  dextrose 50% Injectable 25 Gram(s) IV Push once  dextrose 50% Injectable 12.5 Gram(s) IV Push once  dextrose 50% Injectable 25 Gram(s) IV Push once  famotidine    Tablet 20 milliGRAM(s) Oral daily  gabapentin 100 milliGRAM(s) Oral two times a day  glucagon  Injectable 1 milliGRAM(s) IntraMuscular once  insulin lispro (ADMELOG) corrective regimen sliding scale   SubCutaneous three times a day before meals  insulin lispro (ADMELOG) corrective regimen sliding scale   SubCutaneous at bedtime  methylPREDNISolone sodium succinate Injectable   IV Push   mycophenolate mofetil 1 Gram(s) Oral <User Schedule>  NIFEdipine XL 30 milliGRAM(s) Oral daily  nystatin    Suspension 292334 Unit(s) Swish and Swallow four times a day  polyethylene glycol 3350 17 Gram(s) Oral daily  repaglinide 1 milliGRAM(s) Oral three times a day before meals  senna 2 Tablet(s) Oral at bedtime  trimethoprim   80 mG/sulfamethoxazole 400 mG 1 Tablet(s) Oral daily  valGANciclovir 450 milliGRAM(s) Oral <User Schedule>      Allergies    No Known Allergies    Intolerances        SOCIAL HISTORY:  Denies ETOh,Smoking,     FAMILY HISTORY:  No pertinent family history in first degree relatives        REVIEW OF SYSTEMS:    CONSTITUTIONAL: No weakness, fevers or chills  EYES/ENT: No visual changes;  no throat pain   NECK: No pain or stiffness  RESPIRATORY: No cough, wheezing, hemoptysis; No shortness of breath  CARDIOVASCULAR: No chest pain or palpitations  GASTROINTESTINAL: No abdominal or epigastric pain. No nausea, vomiting,     No diarrhea or constipation. No melena                         9.1    11.76 )-----------( 331      ( 28 May 2023 06:49 )             27.3       CBC Full  -  ( 28 May 2023 06:49 )  WBC Count : 11.76 K/uL  RBC Count : 2.76 M/uL  Hemoglobin : 9.1 g/dL  Hematocrit : 27.3 %  Platelet Count - Automated : 331 K/uL  Mean Cell Volume : 98.9 fl  Mean Cell Hemoglobin : 33.0 pg  Mean Cell Hemoglobin Concentration : 33.3 gm/dL  Auto Neutrophil # : 10.39 K/uL  Auto Lymphocyte # : 0.59 K/uL  Auto Monocyte # : 0.65 K/uL  Auto Eosinophil # : 0.00 K/uL  Auto Basophil # : 0.01 K/uL  Auto Neutrophil % : 88.4 %  Auto Lymphocyte % : 5.0 %  Auto Monocyte % : 5.5 %  Auto Eosinophil % : 0.0 %  Auto Basophil % : 0.1 %      05-28    133<L>  |  95<L>  |  70<H>  ----------------------------<  261<H>  4.9   |  20<L>  |  5.08<H>    Ca    8.1<L>      28 May 2023 06:49  Phos  4.9     05-28  Mg     2.9     05-28    TPro  6.0  /  Alb  3.3  /  TBili  0.3  /  DBili  x   /  AST  20  /  ALT  <5<L>  /  AlkPhos  119  05-28      CAPILLARY BLOOD GLUCOSE      POCT Blood Glucose.: 304 mg/dL (28 May 2023 08:15)  POCT Blood Glucose.: 253 mg/dL (27 May 2023 21:04)  POCT Blood Glucose.: 288 mg/dL (27 May 2023 17:03)      Vital Signs Last 24 Hrs  T(C): 36.5 (28 May 2023 09:00), Max: 36.9 (28 May 2023 00:23)  T(F): 97.7 (28 May 2023 09:00), Max: 98.5 (28 May 2023 00:23)  HR: 96 (28 May 2023 09:00) (82 - 104)  BP: 151/73 (28 May 2023 09:00) (114/55 - 151/73)  BP(mean): 100 (28 May 2023 05:02) (79 - 102)  RR: 18 (28 May 2023 09:00) (18 - 18)  SpO2: 99% (28 May 2023 09:00) (94% - 99%)    Parameters below as of 28 May 2023 09:00  Patient On (Oxygen Delivery Method): room air                PHYSICAL EXAM:    Constitutional: NAD  HEENT: conjunctive   clear   Neck:  No JVD  Respiratory: CTAB  Cardiovascular: S1 and S2  Gastrointestinal: BS+, soft, NT/ND  Extremities: No peripheral edema  Neurological: A/O x 3, no focal deficits  Psychiatric: Normal mood, normal affect  : pos  Mills  Skin: No rashes  Access: Not applicable

## 2023-05-28 NOTE — PROGRESS NOTE ADULT - ASSESSMENT
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 Patient last received HD Wednesday 5/24. He makes a very small amount of urine. now s/p DDRT (1a/1v/1u + stent) with Simulect induction on 5/25/23.     #S/P DDRT (1a/1v/1u + stent)  - Txp US unable to visualize anastomosis but visualized flow within kidney itself  - CC/Renal diet  - Cr improving, UOP improving   - Strict I&Os, griffith   - PRN pain regimen  - Bowel regimen  - OOBTC, ambulate in hallway    #Immunosuppression  - Simulect induction, next dose on POD#4  - Env by level, MMF 1g BID, SST  - PPx: Valcyte, Bactrim, Nystatin, Pepcid    #HTN  - Continue Nifedipine 30mg QD  - Increase as indicated for SBP >170    #NIDDM  - Continue Prandin 1mg TID with meals  - ISS  - Home meds: Prandin 0.5mg BID

## 2023-05-28 NOTE — PROGRESS NOTE ADULT - SUBJECTIVE AND OBJECTIVE BOX
[facility] DIVISION OF KIDNEY DISEASES AND HYPERTENSION -- FOLLOW UP NOTE  --------------------------------------------------------------------------------  Chief Complaint:    24 hour events/subjective:  Patient was seen and examined at bedside  No overnight events       PAST HISTORY  --------------------------------------------------------------------------------  No significant changes to PMH, PSH, FHx, SHx, unless otherwise noted    ALLERGIES & MEDICATIONS  --------------------------------------------------------------------------------  Allergies    No Known Allergies    Intolerances      Standing Inpatient Medications  basiliximab  IVPB 20 milliGRAM(s) IV Intermittent once  calcitriol   Capsule 0.25 MICROGram(s) Oral daily  chlorhexidine 2% Cloths 1 Application(s) Topical daily  dextrose 5%. 1000 milliLiter(s) IV Continuous <Continuous>  dextrose 5%. 1000 milliLiter(s) IV Continuous <Continuous>  dextrose 50% Injectable 25 Gram(s) IV Push once  dextrose 50% Injectable 12.5 Gram(s) IV Push once  dextrose 50% Injectable 25 Gram(s) IV Push once  famotidine    Tablet 20 milliGRAM(s) Oral daily  gabapentin 100 milliGRAM(s) Oral two times a day  glucagon  Injectable 1 milliGRAM(s) IntraMuscular once  insulin lispro (ADMELOG) corrective regimen sliding scale   SubCutaneous three times a day before meals  insulin lispro (ADMELOG) corrective regimen sliding scale   SubCutaneous at bedtime  methylPREDNISolone sodium succinate Injectable   IV Push   methylPREDNISolone sodium succinate Injectable 30 milliGRAM(s) IV Push two times a day  mycophenolate mofetil 1 Gram(s) Oral <User Schedule>  NIFEdipine XL 30 milliGRAM(s) Oral daily  nystatin    Suspension 530946 Unit(s) Swish and Swallow four times a day  polyethylene glycol 3350 17 Gram(s) Oral daily  repaglinide 1 milliGRAM(s) Oral three times a day before meals  senna 2 Tablet(s) Oral at bedtime  tacrolimus ER Tablet (ENVARSUS XR) 4 milliGRAM(s) Oral <User Schedule>  trimethoprim   80 mG/sulfamethoxazole 400 mG 1 Tablet(s) Oral daily  valGANciclovir 450 milliGRAM(s) Oral <User Schedule>    PRN Inpatient Medications  dextrose Oral Gel 15 Gram(s) Oral once PRN  ondansetron Injectable 4 milliGRAM(s) IV Push every 6 hours PRN  traMADol 25 milliGRAM(s) Oral every 6 hours PRN  traMADol 50 milliGRAM(s) Oral every 6 hours PRN      REVIEW OF SYSTEMS  --------------------------------------------------------------------------------  Gen: No fatigue, fevers/chills, weakness  Skin: No rashes  Head/Eyes/Ears/Mouth: No headache;No sore throat  Respiratory: No dyspnea, cough,   CV: No chest pain, PND, orthopnea  GI: No abdominal pain, diarrhea, constipation, nausea, vomiting  Transplant: No pain  : No increased frequency, dysuria, hematuria, nocturia  MSK: No joint pain/swelling; no back pain; no edema  Neuro: No dizziness/lightheadedness, weakness, seizures, numbness, tingling  Psych: No significant nervousness, anxiety, stress, depression    All other systems were reviewed and are negative, except as noted.    VITALS/PHYSICAL EXAM  --------------------------------------------------------------------------------  T(C): 36.5 (05-28-23 @ 09:00), Max: 36.9 (05-28-23 @ 00:23)  HR: 96 (05-28-23 @ 09:00) (82 - 104)  BP: 151/73 (05-28-23 @ 09:00) (114/55 - 151/73)  RR: 18 (05-28-23 @ 09:00) (18 - 18)  SpO2: 99% (05-28-23 @ 09:00) (94% - 99%)  Wt(kg): --        05-27-23 @ 07:01  -  05-28-23 @ 07:00  --------------------------------------------------------  IN: 1440 mL / OUT: 925 mL / NET: 515 mL      Physical Exam:  	Gen: NAD  	HEENT: PERRL, supple neck, clear oropharynx  	Pulm: CTA B/L  	CV: RRR, S1S2; no rub  	Back: No spinal or CVA tenderness; no sacral edema  	Abd: +BS, soft, nontender/nondistended                      Transplant: No tenderness, swelling  	: No suprapubic tenderness  	UE: Warm, FROM; no edema; no asterixis  	LE: Warm, FROM; no edema  	Neuro: No focal deficits  	Psych: Normal affect and mood  	Skin: Warm, without rashes      LABS/STUDIES  --------------------------------------------------------------------------------              9.1    11.76 >-----------<  331      [05-28-23 @ 06:49]              27.3     133  |  95  |  70  ----------------------------<  261      [05-28-23 @ 06:49]  4.9   |  20  |  5.08        Ca     8.1     [05-28-23 @ 06:49]      Mg     2.9     [05-28-23 @ 06:49]      Phos  4.9     [05-28-23 @ 06:49]    TPro  6.0  /  Alb  3.3  /  TBili  0.3  /  DBili  x   /  AST  20  /  ALT  <5  /  AlkPhos  119  [05-28-23 @ 06:49]          Creatinine Trend:  SCr 5.08 [05-28 @ 06:49]  SCr 5.28 [05-27 @ 09:48]  SCr 5.54 [05-27 @ 04:38]  SCr 4.81 [05-26 @ 06:37]  SCr 4.85 [05-25 @ 23:55]    Tacrolimus (), Serum: 5.9 ng/mL (05-28 @ 06:48)  Tacrolimus (), Serum: 9.5 ng/mL (05-27 @ 04:39)  Tacrolimus (), Serum: 6.1 ng/mL (05-26 @ 06:34)                  HBsAb 40.3      [05-25-23 @ 05:10]  HBsAg Nonreact      [05-25-23 @ 05:10]  HBcAb Nonreact      [05-25-23 @ 05:10]  HCV 0.16, Nonreact      [05-25-23 @ 05:10]  HIV Nonreact      [05-25-23 @ 05:10]

## 2023-05-29 LAB
ALBUMIN SERPL ELPH-MCNC: 3.5 G/DL — SIGNIFICANT CHANGE UP (ref 3.3–5)
ALP SERPL-CCNC: 105 U/L — SIGNIFICANT CHANGE UP (ref 40–120)
ALT FLD-CCNC: 8 U/L — LOW (ref 10–45)
ANION GAP SERPL CALC-SCNC: 20 MMOL/L — HIGH (ref 5–17)
AST SERPL-CCNC: 25 U/L — SIGNIFICANT CHANGE UP (ref 10–40)
BASOPHILS # BLD AUTO: 0 K/UL — SIGNIFICANT CHANGE UP (ref 0–0.2)
BASOPHILS NFR BLD AUTO: 0 % — SIGNIFICANT CHANGE UP (ref 0–2)
BILIRUB SERPL-MCNC: 0.4 MG/DL — SIGNIFICANT CHANGE UP (ref 0.2–1.2)
BLD GP AB SCN SERPL QL: NEGATIVE — SIGNIFICANT CHANGE UP
BUN SERPL-MCNC: 84 MG/DL — HIGH (ref 7–23)
CALCIUM SERPL-MCNC: 8.5 MG/DL — SIGNIFICANT CHANGE UP (ref 8.4–10.5)
CHLORIDE SERPL-SCNC: 94 MMOL/L — LOW (ref 96–108)
CO2 SERPL-SCNC: 18 MMOL/L — LOW (ref 22–31)
CREAT SERPL-MCNC: 4.28 MG/DL — HIGH (ref 0.5–1.3)
EGFR: 14 ML/MIN/1.73M2 — LOW
EOSINOPHIL # BLD AUTO: 0 K/UL — SIGNIFICANT CHANGE UP (ref 0–0.5)
EOSINOPHIL NFR BLD AUTO: 0 % — SIGNIFICANT CHANGE UP (ref 0–6)
GLUCOSE BLDC GLUCOMTR-MCNC: 263 MG/DL — HIGH (ref 70–99)
GLUCOSE BLDC GLUCOMTR-MCNC: 275 MG/DL — HIGH (ref 70–99)
GLUCOSE BLDC GLUCOMTR-MCNC: 315 MG/DL — HIGH (ref 70–99)
GLUCOSE BLDC GLUCOMTR-MCNC: 76 MG/DL — SIGNIFICANT CHANGE UP (ref 70–99)
GLUCOSE BLDC GLUCOMTR-MCNC: 89 MG/DL — SIGNIFICANT CHANGE UP (ref 70–99)
GLUCOSE SERPL-MCNC: 265 MG/DL — HIGH (ref 70–99)
HCT VFR BLD CALC: 28.5 % — LOW (ref 39–50)
HGB BLD-MCNC: 9.2 G/DL — LOW (ref 13–17)
IMM GRANULOCYTES NFR BLD AUTO: 1 % — HIGH (ref 0–0.9)
LYMPHOCYTES # BLD AUTO: 0.64 K/UL — LOW (ref 1–3.3)
LYMPHOCYTES # BLD AUTO: 6 % — LOW (ref 13–44)
MAGNESIUM SERPL-MCNC: 2.9 MG/DL — HIGH (ref 1.6–2.6)
MCHC RBC-ENTMCNC: 32.3 GM/DL — SIGNIFICANT CHANGE UP (ref 32–36)
MCHC RBC-ENTMCNC: 32.3 PG — SIGNIFICANT CHANGE UP (ref 27–34)
MCV RBC AUTO: 100 FL — SIGNIFICANT CHANGE UP (ref 80–100)
MONOCYTES # BLD AUTO: 0.69 K/UL — SIGNIFICANT CHANGE UP (ref 0–0.9)
MONOCYTES NFR BLD AUTO: 6.4 % — SIGNIFICANT CHANGE UP (ref 2–14)
NEUTROPHILS # BLD AUTO: 9.31 K/UL — HIGH (ref 1.8–7.4)
NEUTROPHILS NFR BLD AUTO: 86.6 % — HIGH (ref 43–77)
NRBC # BLD: 0 /100 WBCS — SIGNIFICANT CHANGE UP (ref 0–0)
PHOSPHATE SERPL-MCNC: 4.8 MG/DL — HIGH (ref 2.5–4.5)
PLATELET # BLD AUTO: 387 K/UL — SIGNIFICANT CHANGE UP (ref 150–400)
POTASSIUM SERPL-MCNC: 5.5 MMOL/L — HIGH (ref 3.5–5.3)
POTASSIUM SERPL-SCNC: 5.5 MMOL/L — HIGH (ref 3.5–5.3)
PROT SERPL-MCNC: 6.4 G/DL — SIGNIFICANT CHANGE UP (ref 6–8.3)
RBC # BLD: 2.85 M/UL — LOW (ref 4.2–5.8)
RBC # FLD: 16.4 % — HIGH (ref 10.3–14.5)
RH IG SCN BLD-IMP: POSITIVE — SIGNIFICANT CHANGE UP
SODIUM SERPL-SCNC: 132 MMOL/L — LOW (ref 135–145)
TACROLIMUS SERPL-MCNC: 5.8 NG/ML — SIGNIFICANT CHANGE UP
WBC # BLD: 10.75 K/UL — HIGH (ref 3.8–10.5)
WBC # FLD AUTO: 10.75 K/UL — HIGH (ref 3.8–10.5)

## 2023-05-29 PROCEDURE — 99232 SBSQ HOSP IP/OBS MODERATE 35: CPT | Mod: 24

## 2023-05-29 PROCEDURE — 99232 SBSQ HOSP IP/OBS MODERATE 35: CPT

## 2023-05-29 RX ORDER — NIFEDIPINE 30 MG
60 TABLET, EXTENDED RELEASE 24 HR ORAL DAILY
Refills: 0 | Status: DISCONTINUED | OUTPATIENT
Start: 2023-05-30 | End: 2023-06-01

## 2023-05-29 RX ORDER — INSULIN GLARGINE 100 [IU]/ML
8 INJECTION, SOLUTION SUBCUTANEOUS AT BEDTIME
Refills: 0 | Status: DISCONTINUED | OUTPATIENT
Start: 2023-05-29 | End: 2023-05-29

## 2023-05-29 RX ORDER — INSULIN HUMAN 100 [IU]/ML
10 INJECTION, SOLUTION SUBCUTANEOUS ONCE
Refills: 0 | Status: COMPLETED | OUTPATIENT
Start: 2023-05-29 | End: 2023-05-29

## 2023-05-29 RX ORDER — SODIUM BICARBONATE 1 MEQ/ML
1300 SYRINGE (ML) INTRAVENOUS
Refills: 0 | Status: DISCONTINUED | OUTPATIENT
Start: 2023-05-29 | End: 2023-06-01

## 2023-05-29 RX ORDER — FUROSEMIDE 40 MG
80 TABLET ORAL ONCE
Refills: 0 | Status: COMPLETED | OUTPATIENT
Start: 2023-05-29 | End: 2023-05-29

## 2023-05-29 RX ORDER — SODIUM BICARBONATE 1 MEQ/ML
650 SYRINGE (ML) INTRAVENOUS
Refills: 0 | Status: DISCONTINUED | OUTPATIENT
Start: 2023-05-29 | End: 2023-05-29

## 2023-05-29 RX ORDER — TAMSULOSIN HYDROCHLORIDE 0.4 MG/1
0.4 CAPSULE ORAL AT BEDTIME
Refills: 0 | Status: DISCONTINUED | OUTPATIENT
Start: 2023-05-29 | End: 2023-06-01

## 2023-05-29 RX ORDER — DEXTROSE 50 % IN WATER 50 %
50 SYRINGE (ML) INTRAVENOUS ONCE
Refills: 0 | Status: COMPLETED | OUTPATIENT
Start: 2023-05-29 | End: 2023-05-29

## 2023-05-29 RX ORDER — SODIUM ZIRCONIUM CYCLOSILICATE 10 G/10G
10 POWDER, FOR SUSPENSION ORAL ONCE
Refills: 0 | Status: COMPLETED | OUTPATIENT
Start: 2023-05-29 | End: 2023-05-29

## 2023-05-29 RX ORDER — BASILIXIMAB 20 MG/5ML
20 INJECTION, POWDER, FOR SOLUTION INTRAVENOUS ONCE
Refills: 0 | Status: COMPLETED | OUTPATIENT
Start: 2023-05-29 | End: 2023-05-29

## 2023-05-29 RX ORDER — TACROLIMUS 5 MG/1
2 CAPSULE ORAL ONCE
Refills: 0 | Status: COMPLETED | OUTPATIENT
Start: 2023-05-29 | End: 2023-05-29

## 2023-05-29 RX ORDER — NIFEDIPINE 30 MG
30 TABLET, EXTENDED RELEASE 24 HR ORAL ONCE
Refills: 0 | Status: COMPLETED | OUTPATIENT
Start: 2023-05-29 | End: 2023-05-29

## 2023-05-29 RX ORDER — TACROLIMUS 5 MG/1
5 CAPSULE ORAL
Refills: 0 | Status: DISCONTINUED | OUTPATIENT
Start: 2023-05-30 | End: 2023-05-30

## 2023-05-29 RX ADMIN — Medication 20 MILLIGRAM(S): at 05:16

## 2023-05-29 RX ADMIN — INSULIN HUMAN 10 UNIT(S): 100 INJECTION, SOLUTION SUBCUTANEOUS at 08:31

## 2023-05-29 RX ADMIN — Medication 30 MILLIGRAM(S): at 15:14

## 2023-05-29 RX ADMIN — Medication 5 UNIT(S): at 08:33

## 2023-05-29 RX ADMIN — SENNA PLUS 2 TABLET(S): 8.6 TABLET ORAL at 21:47

## 2023-05-29 RX ADMIN — Medication 5 UNIT(S): at 17:10

## 2023-05-29 RX ADMIN — Medication 1300 MILLIGRAM(S): at 17:21

## 2023-05-29 RX ADMIN — Medication 5 UNIT(S): at 12:56

## 2023-05-29 RX ADMIN — MYCOPHENOLATE MOFETIL 1 GRAM(S): 250 CAPSULE ORAL at 08:23

## 2023-05-29 RX ADMIN — GABAPENTIN 100 MILLIGRAM(S): 400 CAPSULE ORAL at 17:10

## 2023-05-29 RX ADMIN — CALCITRIOL 0.25 MICROGRAM(S): 0.5 CAPSULE ORAL at 11:45

## 2023-05-29 RX ADMIN — Medication 0: at 17:09

## 2023-05-29 RX ADMIN — Medication 6: at 12:54

## 2023-05-29 RX ADMIN — REPAGLINIDE 1 MILLIGRAM(S): 1 TABLET ORAL at 08:22

## 2023-05-29 RX ADMIN — Medication 500000 UNIT(S): at 11:43

## 2023-05-29 RX ADMIN — FAMOTIDINE 20 MILLIGRAM(S): 10 INJECTION INTRAVENOUS at 11:44

## 2023-05-29 RX ADMIN — Medication 80 MILLIGRAM(S): at 17:45

## 2023-05-29 RX ADMIN — TRAMADOL HYDROCHLORIDE 50 MILLIGRAM(S): 50 TABLET ORAL at 00:38

## 2023-05-29 RX ADMIN — BASILIXIMAB 20 MILLIGRAM(S): 20 INJECTION, POWDER, FOR SOLUTION INTRAVENOUS at 19:59

## 2023-05-29 RX ADMIN — Medication 500000 UNIT(S): at 17:11

## 2023-05-29 RX ADMIN — Medication 20 MILLIGRAM(S): at 17:11

## 2023-05-29 RX ADMIN — Medication 650 MILLIGRAM(S): at 08:21

## 2023-05-29 RX ADMIN — CHLORHEXIDINE GLUCONATE 1 APPLICATION(S): 213 SOLUTION TOPICAL at 11:45

## 2023-05-29 RX ADMIN — MYCOPHENOLATE MOFETIL 1 GRAM(S): 250 CAPSULE ORAL at 19:59

## 2023-05-29 RX ADMIN — REPAGLINIDE 1 MILLIGRAM(S): 1 TABLET ORAL at 11:44

## 2023-05-29 RX ADMIN — TAMSULOSIN HYDROCHLORIDE 0.4 MILLIGRAM(S): 0.4 CAPSULE ORAL at 21:46

## 2023-05-29 RX ADMIN — VALGANCICLOVIR 450 MILLIGRAM(S): 450 TABLET, FILM COATED ORAL at 08:23

## 2023-05-29 RX ADMIN — SODIUM ZIRCONIUM CYCLOSILICATE 10 GRAM(S): 10 POWDER, FOR SUSPENSION ORAL at 08:23

## 2023-05-29 RX ADMIN — GABAPENTIN 100 MILLIGRAM(S): 400 CAPSULE ORAL at 05:16

## 2023-05-29 RX ADMIN — TACROLIMUS 5 MILLIGRAM(S): 5 CAPSULE ORAL at 08:22

## 2023-05-29 RX ADMIN — REPAGLINIDE 1 MILLIGRAM(S): 1 TABLET ORAL at 17:10

## 2023-05-29 RX ADMIN — Medication 50 MILLILITER(S): at 08:30

## 2023-05-29 RX ADMIN — Medication 1 TABLET(S): at 11:44

## 2023-05-29 RX ADMIN — Medication 30 MILLIGRAM(S): at 05:16

## 2023-05-29 RX ADMIN — Medication 500000 UNIT(S): at 05:16

## 2023-05-29 RX ADMIN — Medication 6: at 08:32

## 2023-05-29 RX ADMIN — Medication 500000 UNIT(S): at 23:14

## 2023-05-29 RX ADMIN — TACROLIMUS 2 MILLIGRAM(S): 5 CAPSULE ORAL at 12:54

## 2023-05-29 RX ADMIN — POLYETHYLENE GLYCOL 3350 17 GRAM(S): 17 POWDER, FOR SOLUTION ORAL at 11:44

## 2023-05-29 NOTE — PROGRESS NOTE ADULT - SUBJECTIVE AND OBJECTIVE BOX
Patient is a 68y Male whom presented to the hospital with esrd on hd    PAST MEDICAL & SURGICAL HISTORY:  DM (diabetes mellitus)      HTN (hypertension)      Chronic kidney disease, unspecified CKD stage      ESRD on dialysis      Pneumonia      Sepsis      Anemia      No significant past surgical history          MEDICATIONS  (STANDING):  basiliximab  IVPB 20 milliGRAM(s) IV Intermittent once  calcitriol   Capsule 0.25 MICROGram(s) Oral daily  chlorhexidine 2% Cloths 1 Application(s) Topical daily  dextrose 5%. 1000 milliLiter(s) (100 mL/Hr) IV Continuous <Continuous>  dextrose 5%. 1000 milliLiter(s) (50 mL/Hr) IV Continuous <Continuous>  dextrose 50% Injectable 25 Gram(s) IV Push once  dextrose 50% Injectable 12.5 Gram(s) IV Push once  dextrose 50% Injectable 25 Gram(s) IV Push once  famotidine    Tablet 20 milliGRAM(s) Oral daily  gabapentin 100 milliGRAM(s) Oral two times a day  glucagon  Injectable 1 milliGRAM(s) IntraMuscular once  insulin lispro (ADMELOG) corrective regimen sliding scale   SubCutaneous three times a day before meals  insulin lispro (ADMELOG) corrective regimen sliding scale   SubCutaneous at bedtime  methylPREDNISolone sodium succinate Injectable   IV Push   mycophenolate mofetil 1 Gram(s) Oral <User Schedule>  NIFEdipine XL 30 milliGRAM(s) Oral daily  nystatin    Suspension 574693 Unit(s) Swish and Swallow four times a day  polyethylene glycol 3350 17 Gram(s) Oral daily  repaglinide 1 milliGRAM(s) Oral three times a day before meals  senna 2 Tablet(s) Oral at bedtime  trimethoprim   80 mG/sulfamethoxazole 400 mG 1 Tablet(s) Oral daily  valGANciclovir 450 milliGRAM(s) Oral <User Schedule>      Allergies    No Known Allergies    Intolerances        SOCIAL HISTORY:  Denies ETOh,Smoking,     FAMILY HISTORY:  No pertinent family history in first degree relatives        REVIEW OF SYSTEMS:    CONSTITUTIONAL: No weakness, fevers or chills  EYES/ENT: No visual changes;  no throat pain   NECK: No pain or stiffness  RESPIRATORY: No cough, wheezing, hemoptysis; No shortness of breath  CARDIOVASCULAR: No chest pain or palpitations  GASTROINTESTINAL: No abdominal or epigastric pain. No nausea, vomiting,     No diarrhea or constipation. No melena                                            9.2    10.75 )-----------( 387      ( 29 May 2023 06:19 )             28.5       CBC Full  -  ( 29 May 2023 06:19 )  WBC Count : 10.75 K/uL  RBC Count : 2.85 M/uL  Hemoglobin : 9.2 g/dL  Hematocrit : 28.5 %  Platelet Count - Automated : 387 K/uL  Mean Cell Volume : 100.0 fl  Mean Cell Hemoglobin : 32.3 pg  Mean Cell Hemoglobin Concentration : 32.3 gm/dL  Auto Neutrophil # : 9.31 K/uL  Auto Lymphocyte # : 0.64 K/uL  Auto Monocyte # : 0.69 K/uL  Auto Eosinophil # : 0.00 K/uL  Auto Basophil # : 0.00 K/uL  Auto Neutrophil % : 86.6 %  Auto Lymphocyte % : 6.0 %  Auto Monocyte % : 6.4 %  Auto Eosinophil % : 0.0 %  Auto Basophil % : 0.0 %      05-29    132<L>  |  94<L>  |  84<H>  ----------------------------<  265<H>  5.5<H>   |  18<L>  |  4.28<H>    Ca    8.5      29 May 2023 06:18  Phos  4.8     05-29  Mg     2.9     05-29    TPro  6.4  /  Alb  3.5  /  TBili  0.4  /  DBili  x   /  AST  25  /  ALT  8<L>  /  AlkPhos  105  05-29      CAPILLARY BLOOD GLUCOSE      POCT Blood Glucose.: 263 mg/dL (29 May 2023 12:19)  POCT Blood Glucose.: 315 mg/dL (29 May 2023 09:52)  POCT Blood Glucose.: 275 mg/dL (29 May 2023 08:30)  POCT Blood Glucose.: 114 mg/dL (28 May 2023 21:55)  POCT Blood Glucose.: 262 mg/dL (28 May 2023 17:29)      Vital Signs Last 24 Hrs  T(C): 36.4 (29 May 2023 12:50), Max: 37 (29 May 2023 01:00)  T(F): 97.5 (29 May 2023 12:50), Max: 98.6 (29 May 2023 01:00)  HR: 98 (29 May 2023 12:50) (95 - 105)  BP: 163/70 (29 May 2023 12:50) (135/65 - 168/74)  BP(mean): --  RR: 18 (29 May 2023 12:50) (18 - 18)  SpO2: 100% (29 May 2023 12:50) (95% - 100%)    Parameters below as of 29 May 2023 12:50  Patient On (Oxygen Delivery Method): room air                      PHYSICAL EXAM:    Constitutional: NAD  HEENT: conjunctive   clear   Neck:  No JVD  Respiratory: CTAB  Cardiovascular: S1 and S2  Gastrointestinal: BS+, soft, NT/ND  Extremities: No peripheral edema  Neurological: A/O x 3, no focal deficits  Psychiatric: Normal mood, normal affect  : pos  Mills  Skin: No rashes  Access: Not applicable

## 2023-05-29 NOTE — PROGRESS NOTE ADULT - SUBJECTIVE AND OBJECTIVE BOX
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 Patient last received HD Wednesday 5/24. He makes a very small amount of urine.    Present: Patient seen and examined with multidisciplinary team including Transplant Surgeon: Dr. Wade,  Transplant Nephrologist: Dr. MARQUISE Garcia, FRANCINE Bernal  and unit RN during am rounds.  Disciplines not in attendance will be notified of the plan.     Interval Events:  - POD#4 DDRT  - UOP increased to 60-70ml/hr  - Cr improving   - Ambulating in hallway  - Return of bowel function  - Hyperglycemic >300, started mealtime lispro w/ improvement     Immunosuppression:   Induction:  Simulect                                        Maintenance Immunosuppression: Env by level, MMF 1g BID, SST   Ongoing monitoring for signs of rejection.    Potential Discharge date: Pending clinical course   Education:  Medications  Plan of care:  See Below      MEDICATIONS  (STANDING):  basiliximab  IVPB 20 milliGRAM(s) IV Intermittent once  basiliximab  IVPB 20 milliGRAM(s) IV Intermittent once  calcitriol   Capsule 0.25 MICROGram(s) Oral daily  chlorhexidine 2% Cloths 1 Application(s) Topical daily  dextrose 5%. 1000 milliLiter(s) (50 mL/Hr) IV Continuous <Continuous>  dextrose 5%. 1000 milliLiter(s) (100 mL/Hr) IV Continuous <Continuous>  dextrose 50% Injectable 25 Gram(s) IV Push once  dextrose 50% Injectable 50 milliLiter(s) IV Push once  dextrose 50% Injectable 25 Gram(s) IV Push once  dextrose 50% Injectable 12.5 Gram(s) IV Push once  famotidine    Tablet 20 milliGRAM(s) Oral daily  gabapentin 100 milliGRAM(s) Oral two times a day  glucagon  Injectable 1 milliGRAM(s) IntraMuscular once  insulin lispro (ADMELOG) corrective regimen sliding scale   SubCutaneous three times a day before meals  insulin lispro (ADMELOG) corrective regimen sliding scale   SubCutaneous at bedtime  insulin lispro Injectable (ADMELOG) 5 Unit(s) SubCutaneous three times a day before meals  insulin regular  human recombinant 10 Unit(s) IV Push once  mycophenolate mofetil 1 Gram(s) Oral <User Schedule>  NIFEdipine XL 30 milliGRAM(s) Oral daily  nystatin    Suspension 157981 Unit(s) Swish and Swallow four times a day  polyethylene glycol 3350 17 Gram(s) Oral daily  predniSONE   Tablet   Oral   predniSONE   Tablet 20 milliGRAM(s) Oral two times a day  repaglinide 1 milliGRAM(s) Oral three times a day before meals  senna 2 Tablet(s) Oral at bedtime  sodium bicarbonate 650 milliGRAM(s) Oral two times a day  sodium zirconium cyclosilicate 10 Gram(s) Oral once  tacrolimus ER Tablet (ENVARSUS XR) 5 milliGRAM(s) Oral <User Schedule>  trimethoprim   80 mG/sulfamethoxazole 400 mG 1 Tablet(s) Oral daily  valGANciclovir 450 milliGRAM(s) Oral <User Schedule>    MEDICATIONS  (PRN):  dextrose Oral Gel 15 Gram(s) Oral once PRN Blood Glucose LESS THAN 70 milliGRAM(s)/deciliter  ondansetron Injectable 4 milliGRAM(s) IV Push every 6 hours PRN Nausea and/or Vomiting  traMADol 25 milliGRAM(s) Oral every 6 hours PRN Moderate Pain (4 - 6)  traMADol 50 milliGRAM(s) Oral every 6 hours PRN Severe Pain (7 - 10)      PAST MEDICAL & SURGICAL HISTORY:  DM (diabetes mellitus)      HTN (hypertension)      Chronic kidney disease, unspecified CKD stage      ESRD on dialysis      Pneumonia      Sepsis      Anemia      No significant past surgical history          Vital Signs Last 24 Hrs  T(C): 36.8 (29 May 2023 05:14), Max: 37 (29 May 2023 01:00)  T(F): 98.3 (29 May 2023 05:14), Max: 98.6 (29 May 2023 01:00)  HR: 102 (29 May 2023 05:14) (95 - 105)  BP: 161/72 (29 May 2023 05:14) (132/66 - 161/72)  BP(mean): --  RR: 18 (29 May 2023 05:14) (18 - 18)  SpO2: 97% (29 May 2023 05:14) (95% - 99%)    Parameters below as of 29 May 2023 05:14  Patient On (Oxygen Delivery Method): room air        I&O's Summary    28 May 2023 07:01  -  29 May 2023 07:00  --------------------------------------------------------  IN: 720 mL / OUT: 1340 mL / NET: -620 mL                              9.2    10.75 )-----------( 387      ( 29 May 2023 06:19 )             28.5     05-29    132<L>  |  94<L>  |  84<H>  ----------------------------<  265<H>  5.5<H>   |  18<L>  |  4.28<H>    Ca    8.5      29 May 2023 06:18  Phos  4.8     05-29  Mg     2.9     05-29    TPro  6.4  /  Alb  3.5  /  TBili  0.4  /  DBili  x   /  AST  25  /  ALT  8<L>  /  AlkPhos  105  05-29    Tacrolimus (), Serum: 5.9 ng/mL (05-28 @ 06:48)                    Review of systems  All other systems were reviewed and are negative, except as noted.      PHYSICAL EXAM:  Constitutional: Well developed / well nourished  Eyes: Anicteric, PERRLA  ENMT: nc/at  Neck: Supple, trachea midline;   Respiratory: CTA B/L  Cardiovascular: RRR  Gastrointestinal: Soft, non distended, mild tenderness at the incision site; RLQ incision closed with minimal SS output c/d/i  Genitourinary: Urinary catheter in place   Extremities: SCD's in place and working bilaterally, AVF   Vascular: Palpable dp pulses bilaterally  Neurological: A&O x3  Skin: no rashes, ulcerations or lesions;  Musculoskeletal: Moving all extremities  Psychiatric: Responsive

## 2023-05-29 NOTE — PROGRESS NOTE ADULT - SUBJECTIVE AND OBJECTIVE BOX
[facility] DIVISION OF KIDNEY DISEASES AND HYPERTENSION -- FOLLOW UP NOTE  --------------------------------------------------------------------------------  Chief Complaint:    24 hour events/subjective:  Patient was seen and examined at bedside  No overnight events.  Gladis is bothering him.        PAST HISTORY  --------------------------------------------------------------------------------  No significant changes to PMH, PSH, FHx, SHx, unless otherwise noted    ALLERGIES & MEDICATIONS  --------------------------------------------------------------------------------  Allergies    No Known Allergies    Intolerances        REVIEW OF SYSTEMS  --------------------------------------------------------------------------------  Gen: No fatigue, fevers/chills, weakness  Skin: No rashes  Head/Eyes/Ears/Mouth: No headache;No sore throat  Respiratory: No dyspnea, cough,   CV: No chest pain, PND, orthopnea  GI: No abdominal pain, diarrhea, constipation, nausea, vomiting  Transplant: No pain  : gladis in place.   MSK: No joint pain/swelling; no back pain; no edema  Neuro: No dizziness/lightheadedness, weakness, seizures, numbness, tingling  Psych: No significant nervousness, anxiety, stress, depression    All other systems were reviewed and are negative, except as noted.    MEDICATIONS  (STANDING):  basiliximab  IVPB 20 milliGRAM(s) IV Intermittent once  calcitriol   Capsule 0.25 MICROGram(s) Oral daily  chlorhexidine 2% Cloths 1 Application(s) Topical daily  dextrose 5%. 1000 milliLiter(s) (100 mL/Hr) IV Continuous <Continuous>  dextrose 5%. 1000 milliLiter(s) (50 mL/Hr) IV Continuous <Continuous>  dextrose 50% Injectable 25 Gram(s) IV Push once  dextrose 50% Injectable 12.5 Gram(s) IV Push once  dextrose 50% Injectable 25 Gram(s) IV Push once  famotidine    Tablet 20 milliGRAM(s) Oral daily  gabapentin 100 milliGRAM(s) Oral two times a day  glucagon  Injectable 1 milliGRAM(s) IntraMuscular once  insulin glargine Injectable (LANTUS) 8 Unit(s) SubCutaneous at bedtime  insulin lispro (ADMELOG) corrective regimen sliding scale   SubCutaneous three times a day before meals  insulin lispro (ADMELOG) corrective regimen sliding scale   SubCutaneous at bedtime  insulin lispro Injectable (ADMELOG) 5 Unit(s) SubCutaneous three times a day before meals  mycophenolate mofetil 1 Gram(s) Oral <User Schedule>  NIFEdipine XL 30 milliGRAM(s) Oral daily  nystatin    Suspension [id number] Unit(s) Swish and Swallow four times a day  polyethylene glycol 3350 17 Gram(s) Oral daily  predniSONE   Tablet   Oral   predniSONE   Tablet 20 milliGRAM(s) Oral two times a day  repaglinide 1 milliGRAM(s) Oral three times a day before meals  senna 2 Tablet(s) Oral at bedtime  sodium bicarbonate 1300 milliGRAM(s) Oral two times a day  tamsulosin 0.4 milliGRAM(s) Oral at bedtime  trimethoprim   80 mG/sulfamethoxazole 400 mG 1 Tablet(s) Oral daily  valGANciclovir 450 milliGRAM(s) Oral <User Schedule>    MEDICATIONS  (PRN):  dextrose Oral Gel 15 Gram(s) Oral once PRN Blood Glucose LESS THAN 70 milliGRAM(s)/deciliter  ondansetron Injectable 4 milliGRAM(s) IV Push every 6 hours PRN Nausea and/or Vomiting  traMADol 25 milliGRAM(s) Oral every 6 hours PRN Moderate Pain (4 - 6)  traMADol 50 milliGRAM(s) Oral every 6 hours PRN Severe Pain (7 - 10)      Vital Signs Last 24 Hrs  T(C): 36.4 (29 May 2023 12:50), Max: 37 (29 May 2023 01:00)  T(F): 97.5 (29 May 2023 12:50), Max: 98.6 (29 May 2023 01:00)  HR: 98 (29 May 2023 12:50) (95 - 105)  BP: 163/70 (29 May 2023 12:50) (135/65 - 168/74)  BP(mean): --  RR: 18 (29 May 2023 12:50) (18 - 18)  SpO2: 100% (29 May 2023 12:50) (95% - 100%)    Parameters below as of 29 May 2023 12:50  Patient On (Oxygen Delivery Method): room air        I&O's Summary    28 May 2023 07:01  -  29 May 2023 07:00  --------------------------------------------------------  IN: 720 mL / OUT: 1340 mL / NET: -620 mL    29 May 2023 07:01  -  29 May 2023 13:42  --------------------------------------------------------  IN: 360 mL / OUT: 260 mL / NET: 100 mL                Physical Exam:  	Gen: NAD  	HEENT: PERRL, supple neck, clear oropharynx  	Pulm: CTA B/L  	CV: RRR, S1S2; no rub  	Back: No spinal or CVA tenderness; no sacral edema  	Abd: +BS, soft, nontender/nondistended                      Transplant: No tenderness, swelling, incision c/d/i  	: No suprapubic tenderness  	UE: RUE edema  	LE: Warm, FROM; no edema  	Neuro: No focal deficits  	Psych: Normal affect and mood  	Skin: Warm, without rashes      LABS/STUDIES  --------------------------------------------------------------------------------                         9.2    10.75 )-----------( 387      ( 29 May 2023 06:19 )             28.5     05-29    132<L>  |  94<L>  |  84<H>  ----------------------------<  265<H>  5.5<H>   |  18<L>  |  4.28<H>    Ca    8.5      29 May 2023 06:18  Phos  4.8     05-29  Mg     2.9     05-29    TPro  6.4  /  Alb  3.5  /  TBili  0.4  /  DBili  x   /  AST  25  /  ALT  8<L>  /  AlkPhos  105  05-29    Tacrolimus (), Serum: 5.8 ng/mL (05-29 @ 06:18)

## 2023-05-29 NOTE — PROGRESS NOTE ADULT - NS ATTEND AMEND GEN_ALL_CORE FT
I personally saw and examined patient.  Kidney transplant recipient on immunosuppression.  Alert, oriented, responsive.  Abdomen soft and non-tender.    IMMUNOSUPPRESSION MANAGEMENT  Envarsus level today: 5.9  Envarsus level yesterday: 9.5  Aim for level of: 8-10  Envarsus dose: 5 mg by mouth daily.  ENVARSUS DOSE MANAGEMENT:  No change in dose
I personally saw and examined patient.  Underwent kidney transplantation.  Alert, oriented, responsive.  Abdomen soft and non-tender.  Wound clean and dry.    IMMUNOSUPPRESSION MANAGEMENT:  Envarsus level today; 6.1  Envarsus level yesterday: 9.5  Aim for level of 8-10  Envarsus dose: 4 mg by mouth daily  ENVARSUS DOSE MANAGEMENT:  No change in current dose
I personally saw and examined patient.  Kidney transplant recipient on immunosuppression.  Alert, oriented, responsive.  Abdomen soft and non-tender.    IMMUNOSUPPRESSION MANAGEMENT  Envarsus level today: 5.8  Envarsus level yesterday: 5.9  Aim for level of: 8-10  Envarsus dose: 5 mg by mouth daily.  ENVARSUS DOSE MANAGEMENT:  Change dose to:  7 mg by mouth daily

## 2023-05-29 NOTE — PROGRESS NOTE ADULT - ASSESSMENT
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 Patient last received HD Wednesday 5/24. He makes a very small amount of urine. now s/p DDRT (1a/1v/1u + stent) with Simulect induction on 5/25/23.     #S/P DDRT (1a/1v/1u + stent)  - Txp US unable to visualize anastomosis but visualized flow within kidney itself  - CC/Renal diet  - Cr improving, UOP improving   - Strict I&Os, griffith   - PRN pain regimen  - Bowel regimen  - OOBTC, ambulate in hallway    #Immunosuppression  - Simulect induction, next dose today   - Env by level, MMF 1g BID, SST  - PPx: Valcyte, Bactrim, Nystatin, Pepcid    #HTN  - Continue Nifedipine 30mg QD  - Increase as indicated for SBP >170    #NIDDM  - Continue Prandin 1mg TID with meals  - ISS, Lispro 5u TID with meals   - Home meds: Prandin 0.5mg BID

## 2023-05-29 NOTE — ADVANCED PRACTICE NURSE CONSULT - REASON FOR CONSULT
Midline Catheter Insertion Note    Catheter type: 4F  : Bard,lot # [id number]  Power injectable: Yes                                                                                                                                                                                                                        Procedure assisted by: Noé Quiroga RN  Time out was preformed, confirming the patient's first and last name, date of birth, procedure, and correct site prior to state of procedure.    Patient was placed with HOB 30 degrees. Patient placement site was prepped with chlorhexidine solution, then draped using maximum sterile barrier protection. Using the Bard Site Rite 8, the catheter was placed using the Modified Seldinger Technique. Strict adherence to outline aseptic technique including handwashing, glove and gown, utilizing mask and cap, plus draping the patient with a sterile drape was observed. Upon completion of line placement, the insertion site was covered with a sterile occlusive CHG dressing. Pt tolerated procedure well.     All materials used for catheter insertion, including the intact guide wires, were accounted for at the end of the procedure.  Number of attempts: 1  Complications/Comments: None    Emergency Placement: No  Site: New  Anatomical Site of insertion: Right  Basilic  Catheter size/length: 4F, 20cm  US guided Bard single lumen power midline placed

## 2023-05-29 NOTE — PROGRESS NOTE ADULT - ASSESSMENT
68 year old male with  ESRD on HD, DM, HTN, and anemia presenting for kidney transplant.   Donor- KDPI 96%, Terminal Cr 0.8  Last IHD was on 5/24/23    PLAN  1.s/p DDRT on 5/24/23- ATN slowly improving.  Has good UOP and Cr is trending down.  Persistently hyperkalemia - increase sodium bicarbonate and change diet to renal.   2. IS meds- Simulect Induction .Dosing tac by level. goal level 8-10 . MMF 1gm po bid and steroid taper per protocol  3. Hyperkalemia- resolved  4. HTN - on Nifedipine 30 mg po daily, will monitor BP and titrate meds accordingly.   5. Hypocalcemia Calcitriol was resumed.   6.  Diabetes 2 - add glargine 8 units daily

## 2023-05-30 ENCOUNTER — TRANSCRIPTION ENCOUNTER (OUTPATIENT)
Age: 68
End: 2023-05-30

## 2023-05-30 DIAGNOSIS — E11.65 TYPE 2 DIABETES MELLITUS WITH HYPERGLYCEMIA: ICD-10-CM

## 2023-05-30 DIAGNOSIS — R73.9 HYPERGLYCEMIA, UNSPECIFIED: ICD-10-CM

## 2023-05-30 DIAGNOSIS — E78.5 HYPERLIPIDEMIA, UNSPECIFIED: ICD-10-CM

## 2023-05-30 DIAGNOSIS — I10 ESSENTIAL (PRIMARY) HYPERTENSION: ICD-10-CM

## 2023-05-30 LAB
ALBUMIN SERPL ELPH-MCNC: 3.8 G/DL — SIGNIFICANT CHANGE UP (ref 3.3–5)
ALP SERPL-CCNC: 96 U/L — SIGNIFICANT CHANGE UP (ref 40–120)
ALT FLD-CCNC: 21 U/L — SIGNIFICANT CHANGE UP (ref 10–45)
ANION GAP SERPL CALC-SCNC: 19 MMOL/L — HIGH (ref 5–17)
AST SERPL-CCNC: 34 U/L — SIGNIFICANT CHANGE UP (ref 10–40)
BASOPHILS # BLD AUTO: 0.01 K/UL — SIGNIFICANT CHANGE UP (ref 0–0.2)
BASOPHILS NFR BLD AUTO: 0.1 % — SIGNIFICANT CHANGE UP (ref 0–2)
BILIRUB SERPL-MCNC: 0.4 MG/DL — SIGNIFICANT CHANGE UP (ref 0.2–1.2)
BUN SERPL-MCNC: 97 MG/DL — HIGH (ref 7–23)
CALCIUM SERPL-MCNC: 8.7 MG/DL — SIGNIFICANT CHANGE UP (ref 8.4–10.5)
CHLORIDE SERPL-SCNC: 93 MMOL/L — LOW (ref 96–108)
CO2 SERPL-SCNC: 20 MMOL/L — LOW (ref 22–31)
CREAT SERPL-MCNC: 4.02 MG/DL — HIGH (ref 0.5–1.3)
EGFR: 16 ML/MIN/1.73M2 — LOW
EOSINOPHIL # BLD AUTO: 0 K/UL — SIGNIFICANT CHANGE UP (ref 0–0.5)
EOSINOPHIL NFR BLD AUTO: 0 % — SIGNIFICANT CHANGE UP (ref 0–6)
GLUCOSE BLDC GLUCOMTR-MCNC: 150 MG/DL — HIGH (ref 70–99)
GLUCOSE BLDC GLUCOMTR-MCNC: 231 MG/DL — HIGH (ref 70–99)
GLUCOSE BLDC GLUCOMTR-MCNC: 341 MG/DL — HIGH (ref 70–99)
GLUCOSE BLDC GLUCOMTR-MCNC: 362 MG/DL — HIGH (ref 70–99)
GLUCOSE BLDC GLUCOMTR-MCNC: 403 MG/DL — HIGH (ref 70–99)
GLUCOSE SERPL-MCNC: 281 MG/DL — HIGH (ref 70–99)
HCT VFR BLD CALC: 28.6 % — LOW (ref 39–50)
HGB BLD-MCNC: 9.5 G/DL — LOW (ref 13–17)
IMM GRANULOCYTES NFR BLD AUTO: 0.7 % — SIGNIFICANT CHANGE UP (ref 0–0.9)
LYMPHOCYTES # BLD AUTO: 0.69 K/UL — LOW (ref 1–3.3)
LYMPHOCYTES # BLD AUTO: 9 % — LOW (ref 13–44)
MAGNESIUM SERPL-MCNC: 3 MG/DL — HIGH (ref 1.6–2.6)
MCHC RBC-ENTMCNC: 32.2 PG — SIGNIFICANT CHANGE UP (ref 27–34)
MCHC RBC-ENTMCNC: 33.2 GM/DL — SIGNIFICANT CHANGE UP (ref 32–36)
MCV RBC AUTO: 96.9 FL — SIGNIFICANT CHANGE UP (ref 80–100)
MONOCYTES # BLD AUTO: 0.74 K/UL — SIGNIFICANT CHANGE UP (ref 0–0.9)
MONOCYTES NFR BLD AUTO: 9.6 % — SIGNIFICANT CHANGE UP (ref 2–14)
NEUTROPHILS # BLD AUTO: 6.2 K/UL — SIGNIFICANT CHANGE UP (ref 1.8–7.4)
NEUTROPHILS NFR BLD AUTO: 80.6 % — HIGH (ref 43–77)
NRBC # BLD: 0 /100 WBCS — SIGNIFICANT CHANGE UP (ref 0–0)
PHOSPHATE SERPL-MCNC: 4.7 MG/DL — HIGH (ref 2.5–4.5)
PLATELET # BLD AUTO: 385 K/UL — SIGNIFICANT CHANGE UP (ref 150–400)
POTASSIUM SERPL-MCNC: 4.4 MMOL/L — SIGNIFICANT CHANGE UP (ref 3.5–5.3)
POTASSIUM SERPL-SCNC: 4.4 MMOL/L — SIGNIFICANT CHANGE UP (ref 3.5–5.3)
PROT SERPL-MCNC: 6.8 G/DL — SIGNIFICANT CHANGE UP (ref 6–8.3)
RBC # BLD: 2.95 M/UL — LOW (ref 4.2–5.8)
RBC # FLD: 16.1 % — HIGH (ref 10.3–14.5)
SODIUM SERPL-SCNC: 132 MMOL/L — LOW (ref 135–145)
TACROLIMUS SERPL-MCNC: 11.6 NG/ML — SIGNIFICANT CHANGE UP
WBC # BLD: 7.69 K/UL — SIGNIFICANT CHANGE UP (ref 3.8–10.5)
WBC # FLD AUTO: 7.69 K/UL — SIGNIFICANT CHANGE UP (ref 3.8–10.5)

## 2023-05-30 PROCEDURE — 99223 1ST HOSP IP/OBS HIGH 75: CPT

## 2023-05-30 PROCEDURE — 93971 EXTREMITY STUDY: CPT | Mod: 26,RT

## 2023-05-30 PROCEDURE — 99232 SBSQ HOSP IP/OBS MODERATE 35: CPT

## 2023-05-30 RX ORDER — TACROLIMUS 5 MG/1
2 CAPSULE ORAL ONCE
Refills: 0 | Status: DISCONTINUED | OUTPATIENT
Start: 2023-05-30 | End: 2023-05-30

## 2023-05-30 RX ORDER — INSULIN GLARGINE 100 [IU]/ML
10 INJECTION, SOLUTION SUBCUTANEOUS AT BEDTIME
Refills: 0 | Status: DISCONTINUED | OUTPATIENT
Start: 2023-05-30 | End: 2023-05-31

## 2023-05-30 RX ORDER — ACETAMINOPHEN 500 MG
650 TABLET ORAL EVERY 6 HOURS
Refills: 0 | Status: DISCONTINUED | OUTPATIENT
Start: 2023-05-30 | End: 2023-06-01

## 2023-05-30 RX ORDER — INSULIN LISPRO 100/ML
8 VIAL (ML) SUBCUTANEOUS
Refills: 0 | Status: DISCONTINUED | OUTPATIENT
Start: 2023-05-30 | End: 2023-06-01

## 2023-05-30 RX ORDER — INSULIN LISPRO 100/ML
10 VIAL (ML) SUBCUTANEOUS
Refills: 0 | Status: DISCONTINUED | OUTPATIENT
Start: 2023-05-30 | End: 2023-05-30

## 2023-05-30 RX ORDER — INSULIN LISPRO 100/ML
VIAL (ML) SUBCUTANEOUS
Refills: 0 | Status: DISCONTINUED | OUTPATIENT
Start: 2023-05-30 | End: 2023-05-31

## 2023-05-30 RX ORDER — INSULIN LISPRO 100/ML
VIAL (ML) SUBCUTANEOUS AT BEDTIME
Refills: 0 | Status: DISCONTINUED | OUTPATIENT
Start: 2023-05-30 | End: 2023-05-31

## 2023-05-30 RX ORDER — TACROLIMUS 5 MG/1
7 CAPSULE ORAL
Refills: 0 | Status: DISCONTINUED | OUTPATIENT
Start: 2023-05-30 | End: 2023-05-30

## 2023-05-30 RX ORDER — ERYTHROPOIETIN 10000 [IU]/ML
10000 INJECTION, SOLUTION INTRAVENOUS; SUBCUTANEOUS ONCE
Refills: 0 | Status: COMPLETED | OUTPATIENT
Start: 2023-05-30 | End: 2023-05-30

## 2023-05-30 RX ORDER — HUMAN INSULIN 100 [IU]/ML
10 INJECTION, SUSPENSION SUBCUTANEOUS ONCE
Refills: 0 | Status: COMPLETED | OUTPATIENT
Start: 2023-05-30 | End: 2023-05-30

## 2023-05-30 RX ADMIN — Medication 5 UNIT(S): at 09:10

## 2023-05-30 RX ADMIN — Medication 10 UNIT(S): at 12:53

## 2023-05-30 RX ADMIN — TRAMADOL HYDROCHLORIDE 50 MILLIGRAM(S): 50 TABLET ORAL at 10:10

## 2023-05-30 RX ADMIN — CALCITRIOL 0.25 MICROGRAM(S): 0.5 CAPSULE ORAL at 12:50

## 2023-05-30 RX ADMIN — Medication 650 MILLIGRAM(S): at 12:49

## 2023-05-30 RX ADMIN — Medication 500000 UNIT(S): at 23:03

## 2023-05-30 RX ADMIN — Medication 10: at 09:11

## 2023-05-30 RX ADMIN — Medication 650 MILLIGRAM(S): at 13:49

## 2023-05-30 RX ADMIN — GABAPENTIN 100 MILLIGRAM(S): 400 CAPSULE ORAL at 05:07

## 2023-05-30 RX ADMIN — MYCOPHENOLATE MOFETIL 1 GRAM(S): 250 CAPSULE ORAL at 20:11

## 2023-05-30 RX ADMIN — Medication 1 TABLET(S): at 12:50

## 2023-05-30 RX ADMIN — Medication 1300 MILLIGRAM(S): at 05:07

## 2023-05-30 RX ADMIN — Medication 12: at 12:54

## 2023-05-30 RX ADMIN — SENNA PLUS 2 TABLET(S): 8.6 TABLET ORAL at 21:57

## 2023-05-30 RX ADMIN — INSULIN GLARGINE 10 UNIT(S): 100 INJECTION, SOLUTION SUBCUTANEOUS at 21:54

## 2023-05-30 RX ADMIN — HUMAN INSULIN 10 UNIT(S): 100 INJECTION, SUSPENSION SUBCUTANEOUS at 12:50

## 2023-05-30 RX ADMIN — CHLORHEXIDINE GLUCONATE 1 APPLICATION(S): 213 SOLUTION TOPICAL at 12:57

## 2023-05-30 RX ADMIN — ERYTHROPOIETIN 10000 UNIT(S): 10000 INJECTION, SOLUTION INTRAVENOUS; SUBCUTANEOUS at 17:29

## 2023-05-30 RX ADMIN — Medication 500000 UNIT(S): at 05:07

## 2023-05-30 RX ADMIN — TACROLIMUS 7 MILLIGRAM(S): 5 CAPSULE ORAL at 09:10

## 2023-05-30 RX ADMIN — Medication 10 MILLIGRAM(S): at 05:13

## 2023-05-30 RX ADMIN — GABAPENTIN 100 MILLIGRAM(S): 400 CAPSULE ORAL at 17:32

## 2023-05-30 RX ADMIN — TAMSULOSIN HYDROCHLORIDE 0.4 MILLIGRAM(S): 0.4 CAPSULE ORAL at 21:57

## 2023-05-30 RX ADMIN — TRAMADOL HYDROCHLORIDE 25 MILLIGRAM(S): 50 TABLET ORAL at 22:06

## 2023-05-30 RX ADMIN — MYCOPHENOLATE MOFETIL 1 GRAM(S): 250 CAPSULE ORAL at 09:12

## 2023-05-30 RX ADMIN — Medication 4: at 17:35

## 2023-05-30 RX ADMIN — POLYETHYLENE GLYCOL 3350 17 GRAM(S): 17 POWDER, FOR SOLUTION ORAL at 12:49

## 2023-05-30 RX ADMIN — Medication 500000 UNIT(S): at 17:31

## 2023-05-30 RX ADMIN — Medication 10 MILLIGRAM(S): at 17:32

## 2023-05-30 RX ADMIN — Medication 60 MILLIGRAM(S): at 09:11

## 2023-05-30 RX ADMIN — Medication 8 UNIT(S): at 17:32

## 2023-05-30 RX ADMIN — TRAMADOL HYDROCHLORIDE 25 MILLIGRAM(S): 50 TABLET ORAL at 23:00

## 2023-05-30 RX ADMIN — Medication 0: at 21:57

## 2023-05-30 RX ADMIN — Medication 500000 UNIT(S): at 12:49

## 2023-05-30 RX ADMIN — Medication 1300 MILLIGRAM(S): at 17:32

## 2023-05-30 RX ADMIN — TRAMADOL HYDROCHLORIDE 50 MILLIGRAM(S): 50 TABLET ORAL at 09:10

## 2023-05-30 RX ADMIN — FAMOTIDINE 20 MILLIGRAM(S): 10 INJECTION INTRAVENOUS at 12:50

## 2023-05-30 NOTE — PROGRESS NOTE ADULT - SUBJECTIVE AND OBJECTIVE BOX
Patient is a 68y Male whom presented to the hospital with esrd on hd    PAST MEDICAL & SURGICAL HISTORY:  DM (diabetes mellitus)      HTN (hypertension)      Chronic kidney disease, unspecified CKD stage      ESRD on dialysis      Pneumonia      Sepsis      Anemia      No significant past surgical history          MEDICATIONS  (STANDING):  basiliximab  IVPB 20 milliGRAM(s) IV Intermittent once  calcitriol   Capsule 0.25 MICROGram(s) Oral daily  chlorhexidine 2% Cloths 1 Application(s) Topical daily  dextrose 5%. 1000 milliLiter(s) (100 mL/Hr) IV Continuous <Continuous>  dextrose 5%. 1000 milliLiter(s) (50 mL/Hr) IV Continuous <Continuous>  dextrose 50% Injectable 25 Gram(s) IV Push once  dextrose 50% Injectable 12.5 Gram(s) IV Push once  dextrose 50% Injectable 25 Gram(s) IV Push once  famotidine    Tablet 20 milliGRAM(s) Oral daily  gabapentin 100 milliGRAM(s) Oral two times a day  glucagon  Injectable 1 milliGRAM(s) IntraMuscular once  insulin lispro (ADMELOG) corrective regimen sliding scale   SubCutaneous three times a day before meals  insulin lispro (ADMELOG) corrective regimen sliding scale   SubCutaneous at bedtime  methylPREDNISolone sodium succinate Injectable   IV Push   mycophenolate mofetil 1 Gram(s) Oral <User Schedule>  NIFEdipine XL 30 milliGRAM(s) Oral daily  nystatin    Suspension 288813 Unit(s) Swish and Swallow four times a day  polyethylene glycol 3350 17 Gram(s) Oral daily  repaglinide 1 milliGRAM(s) Oral three times a day before meals  senna 2 Tablet(s) Oral at bedtime  trimethoprim   80 mG/sulfamethoxazole 400 mG 1 Tablet(s) Oral daily  valGANciclovir 450 milliGRAM(s) Oral <User Schedule>      Allergies    No Known Allergies    Intolerances        SOCIAL HISTORY:  Denies ETOh,Smoking,     FAMILY HISTORY:  No pertinent family history in first degree relatives        REVIEW OF SYSTEMS:    CONSTITUTIONAL: No weakness, fevers or chills  EYES/ENT: No visual changes;  no throat pain   NECK: No pain or stiffness  RESPIRATORY: No cough, wheezing, hemoptysis; No shortness of breath  CARDIOVASCULAR: No chest pain or palpitations  GASTROINTESTINAL: No abdominal or epigastric pain. No nausea, vomiting,     No diarrhea or constipation. No melena                                            9.2    10.75 )-----------( 387      ( 29 May 2023 06:19 )             28.5       CBC Full  -  ( 29 May 2023 06:19 )  WBC Count : 10.75 K/uL  RBC Count : 2.85 M/uL  Hemoglobin : 9.2 g/dL  Hematocrit : 28.5 %  Platelet Count - Automated : 387 K/uL  Mean Cell Volume : 100.0 fl  Mean Cell Hemoglobin : 32.3 pg  Mean Cell Hemoglobin Concentration : 32.3 gm/dL  Auto Neutrophil # : 9.31 K/uL  Auto Lymphocyte # : 0.64 K/uL  Auto Monocyte # : 0.69 K/uL  Auto Eosinophil # : 0.00 K/uL  Auto Basophil # : 0.00 K/uL  Auto Neutrophil % : 86.6 %  Auto Lymphocyte % : 6.0 %  Auto Monocyte % : 6.4 %  Auto Eosinophil % : 0.0 %  Auto Basophil % : 0.0 %      05-29    132<L>  |  94<L>  |  84<H>  ----------------------------<  265<H>  5.5<H>   |  18<L>  |  4.28<H>    Ca    8.5      29 May 2023 06:18  Phos  4.8     05-29  Mg     2.9     05-29    TPro  6.4  /  Alb  3.5  /  TBili  0.4  /  DBili  x   /  AST  25  /  ALT  8<L>  /  AlkPhos  105  05-29      CAPILLARY BLOOD GLUCOSE      POCT Blood Glucose.: 263 mg/dL (29 May 2023 12:19)  POCT Blood Glucose.: 315 mg/dL (29 May 2023 09:52)  POCT Blood Glucose.: 275 mg/dL (29 May 2023 08:30)  POCT Blood Glucose.: 114 mg/dL (28 May 2023 21:55)  POCT Blood Glucose.: 262 mg/dL (28 May 2023 17:29)      Vital Signs Last 24 Hrs  T(C): 36.4 (29 May 2023 12:50), Max: 37 (29 May 2023 01:00)  T(F): 97.5 (29 May 2023 12:50), Max: 98.6 (29 May 2023 01:00)  HR: 98 (29 May 2023 12:50) (95 - 105)  BP: 163/70 (29 May 2023 12:50) (135/65 - 168/74)  BP(mean): --  RR: 18 (29 May 2023 12:50) (18 - 18)  SpO2: 100% (29 May 2023 12:50) (95% - 100%)    Parameters below as of 29 May 2023 12:50  Patient On (Oxygen Delivery Method): room air                      PHYSICAL EXAM:    Constitutional: NAD  HEENT: conjunctive   clear   Neck:  No JVD  Respiratory: CTAB  Cardiovascular: S1 and S2  Gastrointestinal: BS+, soft, NT/ND  Extremities: No peripheral edema  Neurological: A/O x 3, no focal deficits  Psychiatric: Normal mood, normal affect  : pos  Mills  Skin: No rashes  Access: Not applicable

## 2023-05-30 NOTE — CONSULT NOTE ADULT - ATTENDING COMMENTS
68 year old male with  ESRD on HD, DM, HTN, and anemia presenting for kidney transplant.   Donor- KDPI 96%, Terminal Cr 0.8  Last IHD was on 5/24/23    PLAN  DDRT today with Simulect Induction .
Agree with Dr. Phillips's assessment and plan as outlined above. Reviewed all pertinent labs, and imaging studies. Modifications made as indicated above. 68 M with history of HTN, ESRD, T2D presented for DDRT on 5/25. Endocrinology consulted for T2D management. A1C 7.7. Not taking DM meds for the past year while on dialysis. Currently on high dose steroid taper with glucose . Please start basal/bolus as above. Depending on insulin requirement, likely will be discharged on basal/bolus vs basal+ prandin. Patient is high risk with high level decision making due to uncontrolled diabetes which places patient at high risk for cardiovascular and cerebrovascular events. Patient with lability of glucose requiring close monitoring and insulin adjustments.

## 2023-05-30 NOTE — PROGRESS NOTE ADULT - SUBJECTIVE AND OBJECTIVE BOX
[facility] DIVISION OF KIDNEY DISEASES AND HYPERTENSION -- FOLLOW UP NOTE  --------------------------------------------------------------------------------  Chief Complaint:    24 hour events/subjective:  Patient was seen and examined at bedside  Right arm is still swollen.        PAST HISTORY  --------------------------------------------------------------------------------  No significant changes to PMH, PSH, FHx, SHx, unless otherwise noted    ALLERGIES & MEDICATIONS  --------------------------------------------------------------------------------  Allergies    No Known Allergies    Intolerances        REVIEW OF SYSTEMS  --------------------------------------------------------------------------------  Gen: No fatigue, fevers/chills, weakness  Skin: No rashes  Head/Eyes/Ears/Mouth: No headache;No sore throat  Respiratory: No dyspnea, cough,   CV: No chest pain, PND, orthopnea  GI: No abdominal pain, diarrhea, constipation, nausea, vomiting  Transplant: No pain  : urinary frequency.  MSK: No joint pain/swelling; no back pain; no edema  Neuro: No dizziness/lightheadedness, weakness, seizures, numbness, tingling  Psych: No significant nervousness, anxiety, stress, depression    All other systems were reviewed and are negative, except as noted.      MEDICATIONS  (STANDING):  calcitriol   Capsule 0.25 MICROGram(s) Oral daily  chlorhexidine 2% Cloths 1 Application(s) Topical daily  dextrose 5%. 1000 milliLiter(s) (100 mL/Hr) IV Continuous <Continuous>  dextrose 5%. 1000 milliLiter(s) (50 mL/Hr) IV Continuous <Continuous>  dextrose 50% Injectable 25 Gram(s) IV Push once  dextrose 50% Injectable 12.5 Gram(s) IV Push once  dextrose 50% Injectable 25 Gram(s) IV Push once  epoetin sree-epbx (RETACRIT) Injectable 24257 Unit(s) SubCutaneous once  famotidine    Tablet 20 milliGRAM(s) Oral daily  gabapentin 100 milliGRAM(s) Oral two times a day  glucagon  Injectable 1 milliGRAM(s) IntraMuscular once  insulin lispro (ADMELOG) corrective regimen sliding scale   SubCutaneous three times a day before meals  insulin lispro (ADMELOG) corrective regimen sliding scale   SubCutaneous at bedtime  insulin lispro Injectable (ADMELOG) 10 Unit(s) SubCutaneous three times a day before meals  mycophenolate mofetil 1 Gram(s) Oral <User Schedule>  NIFEdipine XL 60 milliGRAM(s) Oral daily  nystatin    Suspension 765071 Unit(s) Swish and Swallow four times a day  polyethylene glycol 3350 17 Gram(s) Oral daily  predniSONE   Tablet   Oral   predniSONE   Tablet 10 milliGRAM(s) Oral two times a day  senna 2 Tablet(s) Oral at bedtime  sodium bicarbonate 1300 milliGRAM(s) Oral two times a day  tacrolimus ER Tablet (ENVARSUS XR) 7 milliGRAM(s) Oral <User Schedule>  tamsulosin 0.4 milliGRAM(s) Oral at bedtime  trimethoprim   80 mG/sulfamethoxazole 400 mG 1 Tablet(s) Oral daily  valGANciclovir 450 milliGRAM(s) Oral <User Schedule>    MEDICATIONS  (PRN):  acetaminophen     Tablet .. 650 milliGRAM(s) Oral every 6 hours PRN Mild Pain (1 - 3)  dextrose Oral Gel 15 Gram(s) Oral once PRN Blood Glucose LESS THAN 70 milliGRAM(s)/deciliter  ondansetron Injectable 4 milliGRAM(s) IV Push every 6 hours PRN Nausea and/or Vomiting  traMADol 50 milliGRAM(s) Oral every 6 hours PRN Severe Pain (7 - 10)  traMADol 25 milliGRAM(s) Oral every 6 hours PRN Moderate Pain (4 - 6)      Vital Signs Last 24 Hrs  T(C): 36.5 (30 May 2023 12:00), Max: 37 (29 May 2023 21:00)  T(F): 97.7 (30 May 2023 12:00), Max: 98.6 (29 May 2023 21:00)  HR: 100 (30 May 2023 12:00) (75 - 100)  BP: 121/62 (30 May 2023 12:00) (120/61 - 154/71)  BP(mean): 82 (30 May 2023 04:38) (82 - 83)  RR: 18 (30 May 2023 12:00) (18 - 18)  SpO2: 100% (30 May 2023 12:00) (95% - 100%)    Parameters below as of 30 May 2023 12:00  Patient On (Oxygen Delivery Method): room air        I&O's Summary    29 May 2023 07:01  -  30 May 2023 07:00  --------------------------------------------------------  IN: 1380 mL / OUT: 835 mL / NET: 545 mL    30 May 2023 07:01  -  30 May 2023 13:14  --------------------------------------------------------  IN: 0 mL / OUT: 100 mL / NET: -100 mL              Physical Exam:  	Gen: NAD  	HEENT: PERRL, supple neck, clear oropharynx  	Pulm: CTA B/L  	CV: RRR, S1S2; no rub  	Back: No spinal or CVA tenderness; no sacral edema  	Abd: +BS, soft, nontender/nondistended                      Transplant: No tenderness, swelling, incision c/d/i  	: No suprapubic tenderness  	UE: RUE edema  	LE: Warm, FROM; no edema  	Neuro: No focal deficits  	Psych: Normal affect and mood  	Skin: Warm, without rashes      LABS/STUDIES  --------------------------------------------------------------------------------                                      9.5    7.69  )-----------( 385      ( 30 May 2023 06:27 )             28.6     05-30    132<L>  |  93<L>  |  97<H>  ----------------------------<  281<H>  4.4   |  20<L>  |  4.02<H>    Ca    8.7      30 May 2023 06:27  Phos  4.7     05-30  Mg     3.0     05-30    TPro  6.8  /  Alb  3.8  /  TBili  0.4  /  DBili  x   /  AST  34  /  ALT  21  /  AlkPhos  96  05-30    Tacrolimus (), Serum: 11.6 ng/mL (05-30 @ 06:27)               [id number] Critical Care - 30 to 74 minutes

## 2023-05-30 NOTE — DISCHARGE NOTE PROVIDER - HOSPITAL COURSE
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 Patient last received HD Wednesday 5/24. He makes a very small amount of urine. Now s/p DDRT (1a/1v/1u + stent) with Simulect induction on 5/25/23.   ************    D/C summary:    (CHECK STENT EDU NOTES)  Did well from surgical perspective. Tolerated PO, ambulated, had bowel function. passed TOV after griffith removal  Excellent graft function. Cr downtrended to 1.76 on d/c. good flow on doppler  Completed Simulect    Was evaluated daily by our multidisciplinary transplant team of surgeons, nephrologists, pharmacists, ACPs, RNs, Nutrition and deemed safe for d/c home with the following plan:  -ENVARSUS-----, MMF 1g BID, Pred taper  -standard PPx Nystatin/Bactrim/Valcyte------  -f/u with   on--------  -f/u with  in 4-6 weeks for ureteral stent removal.  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 The right renal vein was reconstructed with donor IVC in an end-to-end fashion. Case was uneventful. Simulect induction with 4th dose given on 5/29. A drain was left in place, which was removed on 5/28. Mills catheter was removed on 5/29 and patient is able to void spontaneously. Hospital course was significant for HTN requiring nifedipine, hyperkalemia requiring hemodialysis, and RUE superficial thrombophlebitis of the right basilic vein.    Donor: blood type B, CMV-, EBV+  Recipient: blood type B, CMV+, EBV+    Patient was evaluated by the multi-disciplinary team including surgeon, nephrologist, ACP, pharmacist, nutrition, social work, physical therapy, and nursing and deemed stable for discharge home. At the time of discharge, the patient was hemodynamically was tolerating a diet orally, voiding urine, passing stool, and ambulating. Patient was comfortable with adequate pain control. The patient was instructed to follow up with the transplant clinic within 1 week after discharge from the hospital. The patient & family felt comfortable with discharge. The patient was discharged to home. The medication list is as follows:    Maintenance immunosuppression:  - Envarsus 7 mg PO daily  - MMF 1 g PO BID  - Prednisone 5 mg PO daily    Anti-infection:  - Bactrim SS 1 tablet PO daily  - Valganciclovir 450 mg PO daily  - Nystatin swish and swallow 5 mL PO four times daily    - The transplant clinic will call you to make a follow-up appointment with Dr. Wade or one of his partners within one week - please call if no one contacts you. You will also need an appointment in 4-6 weeks for ureteral stent removal  - Please follow up with your primary care physician in one week regarding your hospitalization.  - Avoid heavy lifting anything over 5lbs for six weeks following your surgery date. Do NOT drive a car or operate machinery unless cleared by your transplant surgeon during your follow up visit. Avoid straining with your bowel movements so please use stool softeners as needed. Stop stool softeners if diarrhea develops.   - Call transplant clinic if you develop fever, increased abdominal pain, and/or redness/swelling/bleeding around your incision site  - Call transplant clinic if you have difficulty urinating or notice a decrease in urine output or blood in urine.   - Follow FOOD SAFETY instructions provided to you in your patient education guide booklet   - Bathing: Shower is allowed, you can let soap and water flow over your incision; do NOT rub the area. Bath is NOT allowed until your incision is healed and you have been cleared by your transplant surgeon.        Immunosuppression  - Transplant recipients are at risk for developing infection because immune system is lowered due to transplant medications.   - Avoid contact with sick people and practice good hand hygiene  - Take medications as directed by your transplant team. Never stop taking medications unless instructed by your transplant physician.  - Do NOT double up medication dose if you missed your dose; call the transplant office for instructions.      HTN  Be sure to follow a low salt diet, avoid caffeine, and reduce alcohol intake.  If you have been prescribed antihypertensive medications to control your blood pressure, be sure to take them every day as prescribed and do not miss any doses as the medications do not work if they are not taken consistently. Follow up with your primary care doctor and have your blood pressure checked regularly.     DM  You will need to monitor your blood sugar more frequently after transplant. Uncontrolled blood sugar levels can lead to poor wound healing and other complications. Follow a low carb and low sugar diet. Continue to take all anti-diabetic medications/insulin as prescribed. Follow up with your primary care doctor regularly for blood glucose and A1C checks. 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 The right renal vein was reconstructed with donor IVC in an end-to-end fashion. Case was uneventful. Simulect induction with 4th dose given on 5/29. A drain was left in place, which was removed on 5/28. Mills catheter was removed on 5/29 and patient is able to void spontaneously. Hospital course was significant for HTN requiring nifedipine, hyperkalemia requiring hemodialysis, and RUE superficial thrombophlebitis of the right basilic vein.    Donor: blood type B, CMV-, EBV+  Recipient: blood type B, CMV+, EBV+    Patient was evaluated by the multi-disciplinary team including surgeon, nephrologist, ACP, pharmacist, nutrition, social work, physical therapy, and nursing and deemed stable for discharge home. At the time of discharge, the patient was hemodynamically was tolerating a diet orally, voiding urine, passing stool, and ambulating. Patient was comfortable with adequate pain control. The patient was instructed to follow up with the transplant clinic within 1 week after discharge from the hospital. The patient & family felt comfortable with discharge. The patient was discharged to home. The medication list is as follows:    Maintenance immunosuppression:  - Envarsus 7 mg PO daily  - MMF 1 g PO BID  - Prednisone 5 mg PO daily    Anti-infection:  - Bactrim SS 1 tablet PO daily  - Valganciclovir 450 mg PO daily  - Nystatin swish and swallow 5 mL PO four times daily    - The transplant clinic will call you to make a follow-up appointment with Dr. Wade or one of his partners within one week - please call if no one contacts you. You will also need an appointment in 4-6 weeks for ureteral stent removal  - Please follow up with your primary care physician in one week regarding your hospitalization.  - Avoid heavy lifting anything over 5lbs for six weeks following your surgery date. Do NOT drive a car or operate machinery unless cleared by your transplant surgeon during your follow up visit. Avoid straining with your bowel movements so please use stool softeners as needed. Stop stool softeners if diarrhea develops.   - Call transplant clinic if you develop fever, increased abdominal pain, and/or redness/swelling/bleeding around your incision site  - Call transplant clinic if you have difficulty urinating or notice a decrease in urine output or blood in urine.   - Follow FOOD SAFETY instructions provided to you in your patient education guide booklet   - Bathing: Shower is allowed, you can let soap and water flow over your incision; do NOT rub the area. Bath is NOT allowed until your incision is healed and you have been cleared by your transplant surgeon.     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 The right renal vein was reconstructed with donor IVC in an end-to-end fashion. Case was uneventful. Simulect induction with 4th dose given on 5/29. A drain was left in place, which was removed on 5/28. Mills catheter was removed on 5/29 and patient is able to void spontaneously. Hospital course was significant for HTN requiring nifedipine, hyperkalemia requiring hemodialysis, and RUE superficial thrombophlebitis of the right basilic vein.    Donor: blood type B, CMV-, EBV+  Recipient: blood type B, CMV+, EBV+    Patient was evaluated by the multi-disciplinary team including surgeon, nephrologist, ACP, pharmacist, nutrition, social work, physical therapy, and nursing and deemed stable for discharge home. At the time of discharge, the patient was hemodynamically was tolerating a diet orally, voiding urine, passing stool, and ambulating. Patient was comfortable with adequate pain control. The patient was instructed to follow up with the transplant clinic within 1 week after discharge from the hospital. The patient & family felt comfortable with discharge. The patient was discharged to home. Stent education provided.The medication list is as follows:    Maintenance immunosuppression:  - Envarsus 7 mg PO daily  - MMF 1 g PO BID  - Prednisone 5 mg PO daily    Anti-infection:  - Bactrim SS 1 tablet PO daily  - Valganciclovir 450 mg PO daily  - Nystatin swish and swallow 5 mL PO four times daily    - The transplant clinic will call you to make a follow-up appointment with Dr. Wade or one of his partners within one week - please call if no one contacts you. You will also need an appointment in 4-6 weeks for ureteral stent removal  - Please follow up with your primary care physician in one week regarding your hospitalization.  - Avoid heavy lifting anything over 5lbs for six weeks following your surgery date. Do NOT drive a car or operate machinery unless cleared by your transplant surgeon during your follow up visit. Avoid straining with your bowel movements so please use stool softeners as needed. Stop stool softeners if diarrhea develops.   - Call transplant clinic if you develop fever, increased abdominal pain, and/or redness/swelling/bleeding around your incision site  - Call transplant clinic if you have difficulty urinating or notice a decrease in urine output or blood in urine.   - Follow FOOD SAFETY instructions provided to you in your patient education guide booklet   - Bathing: Shower is allowed, you can let soap and water flow over your incision; do NOT rub the area. Bath is NOT allowed until your incision is healed and you have been cleared by your transplant surgeon.

## 2023-05-30 NOTE — DISCHARGE NOTE PROVIDER - NSDCMRMEDTOKEN_GEN_ALL_CORE_FT
acetaminophen 325 mg oral tablet: 2 tab(s) orally every 6 hours, As needed, Temp greater or equal to 38C (100.4F), Mild Pain (1 - 3) OTC   amLODIPine 10 mg oral tablet: 1 tab(s) orally once a day  calcitriol 0.25 mcg oral capsule: 1 cap(s) orally once a day  calcium acetate 667 mg oral tablet: 1 tab(s) orally 3 times a day   ferrous sulfate 325 mg (65 mg elemental iron) oral tablet: 1 tab(s) orally once a day  gabapentin 100 mg oral capsule: 1 cap(s) orally 2 times a day  hydrALAZINE 10 mg oral tablet: 1 tab(s) orally 3 times a day, As needed, Systolic blood pressure &gt; 170  losartan 100 mg oral tablet: 1 tab(s) orally once a day ,hold for sbp below 100  Nephro-Patito oral tablet: 1 tab(s) orally once a day  pantoprazole 40 mg oral delayed release tablet: 1 tab(s) orally once a day (before a meal)  polyethylene glycol 3350 oral powder for reconstitution: 17 gram(s) orally 2 times a day, As needed, Constipation  repaglinide 0.5 mg oral tablet: 1 tab(s) orally 2 times a day (before meals)   acetaminophen 325 mg oral tablet: 2 tab(s) orally every 6 hours as needed for  mild pain  Bactrim 400 mg-80 mg oral tablet: 1 tab(s) orally once a day  calcitriol 0.25 mcg oral capsule: 1 cap(s) orally once a day  CellCept 250 mg oral capsule: 4 cap(s) orally 2 times a day  Envarsus XR 1 mg oral tablet, extended release: 7 tab(s) orally once a day  ferrous sulfate 325 mg (65 mg elemental iron) oral tablet: 1 tab(s) orally once a day  gabapentin 100 mg oral capsule: 1 cap(s) orally 2 times a day  NIFEdipine (Eqv-Procardia XL) 60 mg oral tablet, extended release: 1 tab(s) orally once a day  nystatin 100,000 units/mL oral suspension: 5 milliliter(s) orally 4 times a day  Pepcid 20 mg oral tablet: 1 tab(s) orally once a day  polyethylene glycol 3350 oral powder for reconstitution: 17 gram(s) orally 2 times a day, As needed, Constipation  predniSONE 5 mg oral tablet: 1 tab(s) orally once a day  Senna 8.6 mg oral tablet: 2 tab(s) orally once a day (at bedtime) as needed for  constipation  sodium bicarbonate 650 mg oral tablet: 2 tab(s) orally 2 times a day  tamsulosin 0.4 mg oral capsule: 1 cap(s) orally once a day (at bedtime)  Tradjenta 5 mg oral tablet: 1 tab(s) orally once a day  Valcyte 450 mg oral tablet: 1 tab(s) orally Monday, Wednesday, and Friday   acetaminophen 325 mg oral tablet: 2 tab(s) orally every 6 hours as needed for  mild pain  Admelog SoloStar 100 units/mL injectable solution: 5 unit(s) injectable 3 times a day (before meals) Please hold if not eating or blood glucose before your meal is LESS THAN 120  Bactrim 400 mg-80 mg oral tablet: 1 tab(s) orally once a day  calcitriol 0.25 mcg oral capsule: 1 cap(s) orally once a day  CellCept 250 mg oral capsule: 4 cap(s) orally 2 times a day  Envarsus XR 1 mg oral tablet, extended release: 7 tab(s) orally once a day  ferrous sulfate 325 mg (65 mg elemental iron) oral tablet: 1 tab(s) orally once a day  gabapentin 100 mg oral capsule: 1 cap(s) orally 2 times a day  NIFEdipine (Eqv-Procardia XL) 60 mg oral tablet, extended release: 1 tab(s) orally once a day  nystatin 100,000 units/mL oral suspension: 5 milliliter(s) orally 4 times a day  Pepcid 20 mg oral tablet: 1 tab(s) orally once a day  polyethylene glycol 3350 oral powder for reconstitution: 17 gram(s) orally 2 times a day, As needed, Constipation  predniSONE 5 mg oral tablet: 1 tab(s) orally once a day  Senna 8.6 mg oral tablet: 2 tab(s) orally once a day (at bedtime) as needed for  constipation  sodium bicarbonate 650 mg oral tablet: 2 tab(s) orally 2 times a day  tamsulosin 0.4 mg oral capsule: 1 cap(s) orally once a day (at bedtime)  Tradjenta 5 mg oral tablet: 1 tab(s) orally once a day  Valcyte 450 mg oral tablet: 1 tab(s) orally Monday, Wednesday, and Friday   acetaminophen 325 mg oral tablet: 2 tab(s) orally every 6 hours as needed for  mild pain  Admelog SoloStar 100 units/mL injectable solution: 5 unit(s) injectable 3 times a day (before meals) Please hold if not eating or blood glucose before your meal is LESS THAN 120  Bactrim 400 mg-80 mg oral tablet: 1 tab(s) orally once a day  CellCept 250 mg oral capsule: 4 cap(s) orally 2 times a day  Envarsus XR 1 mg oral tablet, extended release: 7 tab(s) orally once a day  NIFEdipine (Eqv-Procardia XL) 60 mg oral tablet, extended release: 1 tab(s) orally once a day  nystatin 100,000 units/mL oral suspension: 5 milliliter(s) orally 4 times a day  Pepcid 20 mg oral tablet: 1 tab(s) orally once a day  polyethylene glycol 3350 oral powder for reconstitution: 17 gram(s) orally 2 times a day, As needed, Constipation  predniSONE 5 mg oral tablet: 1 tab(s) orally once a day  Senna 8.6 mg oral tablet: 2 tab(s) orally once a day (at bedtime) as needed for  constipation  tamsulosin 0.4 mg oral capsule: 1 cap(s) orally once a day (at bedtime)  Tradjenta 5 mg oral tablet: 1 tab(s) orally once a day  Valcyte 450 mg oral tablet: 1 tab(s) orally Monday, Wednesday, and Friday   Admelog SoloStar 100 units/mL injectable solution: 5 unit(s) injectable 3 times a day (before meals) Please hold if not eating or blood glucose before your meal is LESS THAN 120  Bactrim 400 mg-80 mg oral tablet: 1 tab(s) orally once a day  CellCept 250 mg oral capsule: 4 cap(s) orally 2 times a day  Envarsus XR 1 mg oral tablet, extended release: 7 tab(s) orally once a day  NIFEdipine (Eqv-Procardia XL) 60 mg oral tablet, extended release: 1 tab(s) orally once a day  nystatin 100,000 units/mL oral suspension: 5 milliliter(s) orally 4 times a day  predniSONE 5 mg oral tablet: 1 tab(s) orally once a day  Protonix 40 mg oral delayed release tablet: 1 tab(s) orally once a day  Senna 8.6 mg oral tablet: 2 tab(s) orally once a day (at bedtime) as needed for  constipation  tamsulosin 0.4 mg oral capsule: 1 cap(s) orally once a day (at bedtime)  Tradjenta 5 mg oral tablet: 1 tab(s) orally once a day  Valcyte 450 mg oral tablet: 1 tab(s) orally Monday, Wednesday, and Friday

## 2023-05-30 NOTE — DISCHARGE NOTE PROVIDER - NSDCCPCAREPLAN_GEN_ALL_CORE_FT
PRINCIPAL DISCHARGE DIAGNOSIS  Diagnosis: Kidney transplant recipient  Assessment and Plan of Treatment: Kidney replaced by transplant  No heavy lifting anything more than 10-15lbs or straining. Otherwise, you may return to your usual level of physical activity. If you are taking narcotic pain medication (such as Percocet), do NOT drive a car, operate machinery or make important decisions.  Call trnansplant clinic If you developed any of the following, fever, pain, redness, swelling at incision site, cough, nausea, vomiting, painful urination, difficulty urination, or not making any urine.  NOTIFY YOUR SURGEON IF: You have any bleeding that does not stop, any pus draining from your wound, any fever (over 100.4 F) or chills, persistent nausea/vomiting with inability to tolerate food or liquids, persistent diarrhea, or if your pain is not controlled on your discharge pain medications.  Immunosuppression:   Keep away from people who have cough, cold, and symptom of flu.  Only take medications that are on your discharge list  If you missed your medications call the transplant office, do not double up medication because you missed a dose.  If you have any question regarding your medication please call transplant office.      SECONDARY DISCHARGE DIAGNOSES  Diagnosis: Kidney transplant recipient  Assessment and Plan of Treatment:      PRINCIPAL DISCHARGE DIAGNOSIS  Diagnosis: Kidney transplant recipient  Assessment and Plan of Treatment: You underwent a  donor renal transplant on   - The transplant clinic will call you to make a follow-up appointment with Dr. Wade within one week - please call if no one contacts you  - Please follow up with your primary care physician in one week regarding your hospitalization.  - You will need removal of your ureteral stent in 4-6 weeks. The transplant clinic will call you to make an follow-up appointment  - Avoid heavy lifting anything over 5lbs for six weeks following your surgery date. Do NOT drive a car or operate machinery unless cleared by your transplant surgeon during your follow up visit. Avoid straining with your bowel movements so please use stool softeners as needed. Stop stool softeners if diarrhea develops.   - Call transplant clinic if you develop fever, increased abdominal pain, and/or redness/swelling/bleeding around your incision site  - Call transplant clinic if you have difficulty urinating or notice a decrease in urine output or blood in urine.   - Follow FOOD SAFETY instructions provided to you in your patient education guide booklet   - Bathing: Shower is allowed, you can let soap and water flow over your incision; do NOT rub the area. Bath is NOT allowed until your incision is healed and you have been cleared by your transplant surgeon.  To get better and follow your care plan as instructed.      SECONDARY DISCHARGE DIAGNOSES  Diagnosis: Kidney transplant recipient  Assessment and Plan of Treatment:

## 2023-05-30 NOTE — DISCHARGE NOTE PROVIDER - NSDCFUADDAPPT_GEN_ALL_CORE_FT
FOLLOW-UP:  1. Please call to make a follow-up appointment with   ( date  )  2. Please follow up with your primary care physician in one week regarding your hospitalization.   FOLLOW-UP:  1. The transplant clinic will call you to make a follow-up appointment with Dr. Wade within one week - please call if no one contacts you.  2. Please follow up with your primary care physician in one week regarding your hospitalization.

## 2023-05-30 NOTE — CONSULT NOTE ADULT - ASSESSMENT
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 HOLD if NPO  -moderate dose correctional scale before each meal and moderate dose correctional scale at bedtime  -consistent carb diet  -FS qAC and qHS    For d/c:   -basal/bolus vs. basal/oral. Will decide closer to d/c  -Can fu with [facility] [place]: [address] [place] [address]; Ph # [phone]    #HTN  BP Goal < 130/80  Continue management per primary team    #HLD  LDL goal < 70  Can check lipid profile outpatient    Samantha Phillips MD  Endocrine Fellow  Can be reached via teams.     For all urgent matters, can call answering service at [phone] (weekdays); [phone] (nights/weekends)  Any non-urgent consults or questions can be emailed to [email or web address] or 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 Prior to that, was on Repaglinide    Currently on Prednisone taper  5/30: Prednisone 10 mg BID  5/31: Prednisone 5 mg BID  6/1 onwards: Prednisone 5 mg daily    Recs:   -Lantus _  qHS  -Admelog _ qAC. HOLD if NPO  -moderate dose correctional scale before each meal and moderate dose correctional scale at bedtime  -consistent carb diet  -FS qAC and qHS    For d/c:   -basal/bolus vs. basal/oral. Will decide closer to d/c  -Can fu with [facility] [place]: [address] [place] [address]; Ph # [phone]    #HTN  BP Goal < 130/80  Continue management per primary team    #HLD  LDL goal < 70  Can check lipid profile outpatient    Samantha Phillips MD  Endocrine Fellow  Can be reached via teams.     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 Prior to that, was on Repaglinide    Currently on Prednisone taper  5/30: Prednisone 10 mg BID  5/31: Prednisone 5 mg BID  6/1 onwards: Prednisone 5 mg daily    Recs:   -Lantus 10 units qHS  -Admelog 8 units qAC. HOLD if NPO  -moderate dose correctional scale before each meal and moderate dose correctional scale at bedtime  -consistent carb diet  -FS qAC and qHS    For d/c:   -basal/bolus vs. basal/oral. Will decide closer to d/c  -Can fu with [facility] [place]: [address] [place] [address]; Ph # [phone]    #HTN  BP Goal < 130/80  Continue management per primary team    #HLD  LDL goal < 70  Can check lipid profile outpatient    Samantha Phillips MD  Endocrine Fellow  Can be reached via teams.     For all urgent matters, can call answering service at [phone] (weekdays); [phone] (nights/weekends)  Any non-urgent consults or questions can be emailed to [email or web address] or [email or web address]

## 2023-05-30 NOTE — PROGRESS NOTE ADULT - ASSESSMENT
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 Patient last received HD Wednesday 5/24. He makes a very small amount of urine. now s/p DDRT (1a/1v/1u + stent) with Simulect induction on 5/25/23.     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 Patient last received HD Wednesday 5/24. He makes a very small amount of urine. now s/p DDRT (1a/1v/1u + stent) with Simulect induction on 5/25/23.     #S/P DDRT (1a/1v/1u + stent)  - Txp US unable to visualize anastomosis but visualized flow within kidney itself  - CC/Renal diet  - Cr improving, UOP improving   - Strict I&Os, griffith   - PRN pain regimen  - Bowel regimen  - OOBTC, ambulate in hallway  -F/u RUE duplex    #Immunosuppression  - Simulect induction  - Env by level, MMF 1g BID, SST  - PPx: Valcyte, Bactrim, Nystatin, Pepcid    #HTN  - Continue Nifedipine 30mg QD  - Increase as indicated for SBP >170    #NIDDM  - Endocrine consult  - DC premeal regimen  - Home meds: Prandin 0.5mg BID

## 2023-05-30 NOTE — DISCHARGE NOTE PROVIDER - CARE PROVIDER_API CALL
Keyur Wade Pompeo  Surgery  [address] [place] [id number]  Phone: [phone]  Fax: [phone]  Follow Up Time:

## 2023-05-30 NOTE — PROGRESS NOTE ADULT - SUBJECTIVE AND OBJECTIVE BOX
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 Patient last received HD Wednesday 5/24. He makes a very small amount of urine.    Present: Patient seen and examined with multidisciplinary team including Transplant Surgeon: Dr. Polk,  Transplant Nephrologist: Dr. MARQUISE Garcia, FRANICNE Owens  and unit RN during am rounds.  Disciplines not in attendance will be notified of the plan.     Interval Events:  - POD#5 DDRT  - BGMs uncontrolled  - Cr improving   - Ambulating in hallway  - Return of bowel function  - Pain well controlled    Immunosuppression:   Induction:  Simulect                                        Maintenance Immunosuppression: Env by level, MMF 1g BID, SST   Ongoing monitoring for signs of rejection.    Potential Discharge date: Pending clinical course   Education:  Medications  Plan of care:  See Below      MEDICATIONS  (STANDING):  calcitriol   Capsule 0.25 MICROGram(s) Oral daily  chlorhexidine 2% Cloths 1 Application(s) Topical daily  dextrose 5%. 1000 milliLiter(s) (50 mL/Hr) IV Continuous <Continuous>  dextrose 5%. 1000 milliLiter(s) (100 mL/Hr) IV Continuous <Continuous>  dextrose 50% Injectable 25 Gram(s) IV Push once  dextrose 50% Injectable 25 Gram(s) IV Push once  dextrose 50% Injectable 12.5 Gram(s) IV Push once  famotidine    Tablet 20 milliGRAM(s) Oral daily  gabapentin 100 milliGRAM(s) Oral two times a day  glucagon  Injectable 1 milliGRAM(s) IntraMuscular once  insulin lispro (ADMELOG) corrective regimen sliding scale   SubCutaneous three times a day before meals  insulin lispro (ADMELOG) corrective regimen sliding scale   SubCutaneous at bedtime  insulin lispro Injectable (ADMELOG) 5 Unit(s) SubCutaneous three times a day before meals  mycophenolate mofetil 1 Gram(s) Oral <User Schedule>  NIFEdipine XL 60 milliGRAM(s) Oral daily  nystatin    Suspension 961713 Unit(s) Swish and Swallow four times a day  polyethylene glycol 3350 17 Gram(s) Oral daily  predniSONE   Tablet 10 milliGRAM(s) Oral two times a day  predniSONE   Tablet   Oral   senna 2 Tablet(s) Oral at bedtime  sodium bicarbonate 1300 milliGRAM(s) Oral two times a day  tacrolimus ER Tablet (ENVARSUS XR) 7 milliGRAM(s) Oral <User Schedule>  tamsulosin 0.4 milliGRAM(s) Oral at bedtime  trimethoprim   80 mG/sulfamethoxazole 400 mG 1 Tablet(s) Oral daily  valGANciclovir 450 milliGRAM(s) Oral <User Schedule>    MEDICATIONS  (PRN):  dextrose Oral Gel 15 Gram(s) Oral once PRN Blood Glucose LESS THAN 70 milliGRAM(s)/deciliter  ondansetron Injectable 4 milliGRAM(s) IV Push every 6 hours PRN Nausea and/or Vomiting  traMADol 25 milliGRAM(s) Oral every 6 hours PRN Moderate Pain (4 - 6)  traMADol 50 milliGRAM(s) Oral every 6 hours PRN Severe Pain (7 - 10)      PAST MEDICAL & SURGICAL HISTORY:  DM (diabetes mellitus)      HTN (hypertension)      Chronic kidney disease, unspecified CKD stage      ESRD on dialysis      Pneumonia      Sepsis      Anemia      No significant past surgical history          Vital Signs Last 24 Hrs  T(C): 36.5 (30 May 2023 08:27), Max: 37 (29 May 2023 21:00)  T(F): 97.7 (30 May 2023 08:27), Max: 98.6 (29 May 2023 21:00)  HR: 100 (30 May 2023 09:11) (75 - 100)  BP: 154/71 (30 May 2023 09:11) (120/61 - 163/70)  BP(mean): 82 (30 May 2023 04:38) (82 - 83)  RR: 18 (30 May 2023 08:27) (18 - 18)  SpO2: 100% (30 May 2023 09:11) (95% - 100%)    Parameters below as of 30 May 2023 08:27  Patient On (Oxygen Delivery Method): room air        I&O's Summary    29 May 2023 07:01  -  30 May 2023 07:00  --------------------------------------------------------  IN: 1380 mL / OUT: 835 mL / NET: 545 mL    30 May 2023 07:01  -  30 May 2023 10:37  --------------------------------------------------------  IN: 0 mL / OUT: 100 mL / NET: -100 mL                              9.5    7.69  )-----------( 385      ( 30 May 2023 06:27 )             28.6     05-30    132<L>  |  93<L>  |  97<H>  ----------------------------<  281<H>  4.4   |  20<L>  |  4.02<H>    Ca    8.7      30 May 2023 06:27  Phos  4.7     05-30  Mg     3.0     05-30    TPro  6.8  /  Alb  3.8  /  TBili  0.4  /  DBili  x   /  AST  34  /  ALT  21  /  AlkPhos  96  05-30    Tacrolimus (), Serum: 11.6 ng/mL (05-30 @ 06:27)                PHYSICAL EXAM:  Constitutional: Well developed / well nourished  Eyes: Anicteric, PERRLA  ENMT: nc/at  Neck: Supple, trachea midline;   Respiratory: CTA B/L  Cardiovascular: RRR  Gastrointestinal: Soft, non distended, mild tenderness at the incision site; RLQ incision closed with minimal SS output c/d/i  Genitourinary: Urinary catheter in place   Extremities: SCD's in place and working bilaterally, AVF   Vascular: Palpable dp pulses bilaterally  Neurological: A&O x3  Skin: no rashes, ulcerations or lesions;  Musculoskeletal: Moving all extremities  Psychiatric: Responsive     Transplant Surgery - Multidisciplinary Rounds  --------------------------------------------------------------  DDRT (1a/1v/1u + stent)     POD#5    HPI: DARIEN MCGINNIS is a 68 year old male PMH HTN, 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 Patient last received HD Wednesday 5/24. He makes a very small amount of urine.    Present: Patient seen and examined with multidisciplinary team including Transplant Surgeon: Dr. Polk,  Transplant Nephrologist: Dr. MARQUISE Garcia, FRANCINE Owens  and unit RN during am rounds.  Disciplines not in attendance will be notified of the plan.     Interval Events:  - POD#5 DDRT  - RUE swelling, duplex ordered  - BGMs uncontrolled  - Cr improving   - Ambulating in hallway  - Return of bowel function  - Pain well controlled    Immunosuppression:   Induction:  Simulect                                        Maintenance Immunosuppression: Env by level, MMF 1g BID, SST   Ongoing monitoring for signs of rejection.    Potential Discharge date: Pending clinical course   Education:  Medications  Plan of care:  See Below      MEDICATIONS  (STANDING):  calcitriol   Capsule 0.25 MICROGram(s) Oral daily  chlorhexidine 2% Cloths 1 Application(s) Topical daily  dextrose 5%. 1000 milliLiter(s) (50 mL/Hr) IV Continuous <Continuous>  dextrose 5%. 1000 milliLiter(s) (100 mL/Hr) IV Continuous <Continuous>  dextrose 50% Injectable 25 Gram(s) IV Push once  dextrose 50% Injectable 25 Gram(s) IV Push once  dextrose 50% Injectable 12.5 Gram(s) IV Push once  famotidine    Tablet 20 milliGRAM(s) Oral daily  gabapentin 100 milliGRAM(s) Oral two times a day  glucagon  Injectable 1 milliGRAM(s) IntraMuscular once  insulin lispro (ADMELOG) corrective regimen sliding scale   SubCutaneous three times a day before meals  insulin lispro (ADMELOG) corrective regimen sliding scale   SubCutaneous at bedtime  insulin lispro Injectable (ADMELOG) 5 Unit(s) SubCutaneous three times a day before meals  mycophenolate mofetil 1 Gram(s) Oral <User Schedule>  NIFEdipine XL 60 milliGRAM(s) Oral daily  nystatin    Suspension 880629 Unit(s) Swish and Swallow four times a day  polyethylene glycol 3350 17 Gram(s) Oral daily  predniSONE   Tablet 10 milliGRAM(s) Oral two times a day  predniSONE   Tablet   Oral   senna 2 Tablet(s) Oral at bedtime  sodium bicarbonate 1300 milliGRAM(s) Oral two times a day  tacrolimus ER Tablet (ENVARSUS XR) 7 milliGRAM(s) Oral <User Schedule>  tamsulosin 0.4 milliGRAM(s) Oral at bedtime  trimethoprim   80 mG/sulfamethoxazole 400 mG 1 Tablet(s) Oral daily  valGANciclovir 450 milliGRAM(s) Oral <User Schedule>    MEDICATIONS  (PRN):  dextrose Oral Gel 15 Gram(s) Oral once PRN Blood Glucose LESS THAN 70 milliGRAM(s)/deciliter  ondansetron Injectable 4 milliGRAM(s) IV Push every 6 hours PRN Nausea and/or Vomiting  traMADol 25 milliGRAM(s) Oral every 6 hours PRN Moderate Pain (4 - 6)  traMADol 50 milliGRAM(s) Oral every 6 hours PRN Severe Pain (7 - 10)      PAST MEDICAL & SURGICAL HISTORY:  DM (diabetes mellitus)      HTN (hypertension)      Chronic kidney disease, unspecified CKD stage      ESRD on dialysis      Pneumonia      Sepsis      Anemia      No significant past surgical history          Vital Signs Last 24 Hrs  T(C): 36.5 (30 May 2023 08:27), Max: 37 (29 May 2023 21:00)  T(F): 97.7 (30 May 2023 08:27), Max: 98.6 (29 May 2023 21:00)  HR: 100 (30 May 2023 09:11) (75 - 100)  BP: 154/71 (30 May 2023 09:11) (120/61 - 163/70)  BP(mean): 82 (30 May 2023 04:38) (82 - 83)  RR: 18 (30 May 2023 08:27) (18 - 18)  SpO2: 100% (30 May 2023 09:11) (95% - 100%)    Parameters below as of 30 May 2023 08:27  Patient On (Oxygen Delivery Method): room air        I&O's Summary    29 May 2023 07:01  -  30 May 2023 07:00  --------------------------------------------------------  IN: 1380 mL / OUT: 835 mL / NET: 545 mL    30 May 2023 07:01  -  30 May 2023 10:37  --------------------------------------------------------  IN: 0 mL / OUT: 100 mL / NET: -100 mL                              9.5    7.69  )-----------( 385      ( 30 May 2023 06:27 )             28.6     05-30    132<L>  |  93<L>  |  97<H>  ----------------------------<  281<H>  4.4   |  20<L>  |  4.02<H>    Ca    8.7      30 May 2023 06:27  Phos  4.7     05-30  Mg     3.0     05-30    TPro  6.8  /  Alb  3.8  /  TBili  0.4  /  DBili  x   /  AST  34  /  ALT  21  /  AlkPhos  96  05-30    Tacrolimus (), Serum: 11.6 ng/mL (05-30 @ 06:27)                PHYSICAL EXAM:  Constitutional: Well developed / well nourished  Eyes: Anicteric, PERRLA  ENMT: nc/at  Neck: Supple, trachea midline;   Respiratory: CTA B/L  Cardiovascular: RRR  Gastrointestinal: Soft, non distended, mild tenderness at the incision site; RLQ incision closed with minimal SS output c/d/i  Genitourinary: Urinary catheter in place   Extremities: SCD's in place and working bilaterally, AVF   Vascular: Palpable dp pulses bilaterally  Neurological: A&O x3  Skin: no rashes, ulcerations or lesions;  Musculoskeletal: Moving all extremities  Psychiatric: Responsive

## 2023-05-30 NOTE — CHART NOTE - NSCHARTNOTEFT_GEN_A_CORE
Nutrition Follow Up Note  Patient seen for: Nutrition Department protocol for post kidney transplant recipient follow up   Pt is s/p DDRT 23  POD#5    Chart reviewed, events noted. 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 Patient last received HD . He makes a very small amount of urine. now s/p DDRT (1a/1v/1u + stent) with Simulect induction on 23."    Source: [X] Patient via  services ( Robert ID: [id number])       [x] Current Medical Record      [] RN        [] Family at bedside       [] Other:    -If unable to interview patient: [] Trach/Vent/BiPAP  [] Disoriented/confused/inappropriate to interview    Diet Order:   Diet, Consistent Carbohydrate w/Evening Snack:   No Concentrated Potassium  No Concentrated Phosphorus  Lacto Veg (Accepts Milk & Milk Products) (23)    - Is current order appropriate/adequate? [X] Yes  []  No:     - PO intake :   [] >75%  Adequate    [X] 50-75%  Fair       [] <50%  Poor    - Nutrition-related concerns:      - Ordered for transplant meds: Deltasone, Tacrolimus, Cellcept       - BG Management: Steroid Induced hyperglycemia; SSI, Lispro 10 U 3x/day before meals      - Pt provided with post-transplant education on initial assessment, reinforced diet education during follow up visit.     GI:  Last BM 23  Bowel Regimen? [X] Yes   [] No    UOP: 200 ml thus far (), 835 ml (), 1,340 ml ()     Weights: Daily Weight in k.8 (), Weight in k.7 (), Weight in k (), Weight in k.2 (), Weight in k.8 (), Weight in k.7 ()  -- Weight changes likely secondary to perioperative and edema fluid shifts, will continue to monitor     Nutritionally Pertinent MEDICATIONS  (STANDING):  calcitriol   Capsule  dextrose 5%.  dextrose 5%.  dextrose 50% Injectable  dextrose 50% Injectable  dextrose 50% Injectable  famotidine    Tablet  glucagon  Injectable  insulin lispro (ADMELOG) corrective regimen sliding scale  insulin lispro (ADMELOG) corrective regimen sliding scale  insulin lispro Injectable (ADMELOG)  NIFEdipine XL  nystatin    Suspension  polyethylene glycol 3350  predniSONE   Tablet  predniSONE   Tablet  senna  sodium bicarbonate  trimethoprim   80 mG/sulfamethoxazole 400 mG  valGANciclovir    Pertinent Labs:  @ 06:27: Na 132<L>, BUN 97<H>, Cr 4.02<H>, <H>, K+ 4.4, Phos 4.7<H>, Mg 3.0<H>, Alk Phos 96, ALT/SGPT 21, AST/SGOT 34, HbA1c --    A1C with Estimated Average Glucose Result: 7.7 % (23 @ 06:38)    Finger Sticks:  POCT Blood Glucose.: 403 mg/dL ( @ 12:13)  POCT Blood Glucose.: 362 mg/dL ( @ 08:44)  POCT Blood Glucose.: 341 mg/dL ( @ 06:59)  POCT Blood Glucose.: 89 mg/dL ( @ 21:15)  POCT Blood Glucose.: 76 mg/dL ( @ 16:15)      Skin per nursing documentation: -- No pressure injuries noted per flow sheets   Edema: +1 edema to left arm; +2 edema to right arm; +3 edema to right hand     Estimated Needs:   [X] no change since previous assessment  [] recalculated:     Previous Nutrition Diagnosis:   1. Increased Nutrient Needs  2. Food and Nutrition related knowledge deficit   Nutrition Diagnosis is: [X] ongoing  [] resolved [] not applicable     New Nutrition Diagnosis: [X] Not applicable    Nutrition Care Plan:  [X] In Progress  [] Achieved  [] Not applicable    Nutrition Interventions:     Education Provided:       [X] Yes: Reviewed post-transplant nutrition therapy and food safety guidelines for transplant recipients.  Reviewed recommendations to avoid grapefruit, pomegranate and star fruit while taking immunosuppressant medication.  Reviewed recommendations for moderate intake of sodium with transplant medications.  Reviewed Consistent Carbohydrate diet, including carbohydrate content of foods and portion sizes. Pt is advised that BG management may be more challenging after transplant [] No:        Recommendations:         1. Continue diet as ordered/tolerated upon discharge: Consistent Carbohydrate; No Concentrated Potassium; No Concentrated Phosphorus     2. Recommend follow up visit with Transplant MD & outpatient RD as warranted      3. Provide encouragement with PO intakes, menu selections and assistance with meals as needed      4. Add oral nutrition supplement: [employer] Renal Support 3x/day (PR2RN requested)     5. Provided education on post-transplant nutrition therapy & food safety guidelines for transplant recipients with nutrition package before discharge- made aware RD remains available      Monitoring and Evaluation:   Continue to monitor patient's weights, labs, PO intakes, urine output, blood glucose levels, and bowel movements.     RD remains available upon request and will follow up per protocol  Marlen Lamb MS,RDN, CDN, Pager # [phone] or TEAMS

## 2023-05-30 NOTE — DISCHARGE NOTE PROVIDER - NSFOLLOWUPCLINICS_GEN_ALL_ED_FT
[facility]  Endocrinology  [address] [place] [address]  Phone: [phone]  Fax:

## 2023-05-30 NOTE — PROGRESS NOTE ADULT - ASSESSMENT
68 year old male with  ESRD on HD, DM, HTN, and anemia presenting for kidney transplant.   Donor- KDPI 96%, Terminal Cr 0.8  Last IHD was on 5/24/23    PLAN  1.s/p DDRT on 5/24/23- ATN slowly improving.  Has good UOP and Cr is trending down.  Potassium has improved.   2. IS meds- Simulect Induction .Dosing tac by level. goal level 8-10 . MMF 1gm po bid and steroid taper per protocol  3. Hyperkalemia- resolved  4. HTN - pressure ok.    5. Hypocalcemia Calcitriol was resumed.   6.  Diabetes 2 - Glargine was not given.  Sugar high this morning, will consult endocrine.  Stop prandin.

## 2023-05-30 NOTE — CONSULT NOTE ADULT - SUBJECTIVE AND OBJECTIVE BOX
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 Patient last received HD Wednesday 5/24. He makes a very small amount of urine      Consulted for: Steroid Induced hyperglycemia    Diabetes History:   Most recent A1c 7.7%  -Diagnosed   -Endocrinologist  -Current Regimen  -Past Regimens  -FS at home  -Diet  -Complications/Diabetes HCM - CKD, s/p renal transplant 5/25  Currently on Prednisone taper      PAST MEDICAL & SURGICAL HISTORY:  DM (diabetes mellitus)      HTN (hypertension)      Chronic kidney disease, unspecified CKD stage      ESRD on dialysis      Pneumonia      Sepsis      Anemia      No significant past surgical history          FAMILY HISTORY:  No pertinent family history in first degree relatives        Social History: No tobacco use    Home Medications:  acetaminophen 325 mg oral tablet: 2 tab(s) orally every 6 hours, As needed, Temp greater or equal to 38C (100.4F), Mild Pain (1 - 3) OTC  (22 Feb 2022 16:53)  hydrALAZINE 10 mg oral tablet: 1 tab(s) orally 3 times a day, As needed, Systolic blood pressure &gt; 170 (22 Feb 2022 15:17)      MEDICATIONS  (STANDING):  calcitriol   Capsule 0.25 MICROGram(s) Oral daily  chlorhexidine 2% Cloths 1 Application(s) Topical daily  dextrose 5%. 1000 milliLiter(s) (100 mL/Hr) IV Continuous <Continuous>  dextrose 5%. 1000 milliLiter(s) (50 mL/Hr) IV Continuous <Continuous>  dextrose 50% Injectable 25 Gram(s) IV Push once  dextrose 50% Injectable 25 Gram(s) IV Push once  dextrose 50% Injectable 12.5 Gram(s) IV Push once  epoetin sree-epbx (RETACRIT) Injectable 29731 Unit(s) SubCutaneous once  famotidine    Tablet 20 milliGRAM(s) Oral daily  gabapentin 100 milliGRAM(s) Oral two times a day  glucagon  Injectable 1 milliGRAM(s) IntraMuscular once  insulin lispro (ADMELOG) corrective regimen sliding scale   SubCutaneous three times a day before meals  insulin lispro (ADMELOG) corrective regimen sliding scale   SubCutaneous at bedtime  insulin lispro Injectable (ADMELOG) 10 Unit(s) SubCutaneous three times a day before meals  mycophenolate mofetil 1 Gram(s) Oral <User Schedule>  NIFEdipine XL 60 milliGRAM(s) Oral daily  nystatin    Suspension 361975 Unit(s) Swish and Swallow four times a day  polyethylene glycol 3350 17 Gram(s) Oral daily  predniSONE   Tablet   Oral   predniSONE   Tablet 10 milliGRAM(s) Oral two times a day  senna 2 Tablet(s) Oral at bedtime  sodium bicarbonate 1300 milliGRAM(s) Oral two times a day  tacrolimus ER Tablet (ENVARSUS XR) 7 milliGRAM(s) Oral <User Schedule>  tamsulosin 0.4 milliGRAM(s) Oral at bedtime  trimethoprim   80 mG/sulfamethoxazole 400 mG 1 Tablet(s) Oral daily  valGANciclovir 450 milliGRAM(s) Oral <User Schedule>    MEDICATIONS  (PRN):  acetaminophen     Tablet .. 650 milliGRAM(s) Oral every 6 hours PRN Mild Pain (1 - 3)  dextrose Oral Gel 15 Gram(s) Oral once PRN Blood Glucose LESS THAN 70 milliGRAM(s)/deciliter  ondansetron Injectable 4 milliGRAM(s) IV Push every 6 hours PRN Nausea and/or Vomiting  traMADol 25 milliGRAM(s) Oral every 6 hours PRN Moderate Pain (4 - 6)  traMADol 50 milliGRAM(s) Oral every 6 hours PRN Severe Pain (7 - 10)      Allergies    No Known Allergies    Intolerances      Review of Systems:  Constitutional: No fever  Eyes: No blurry vision  Neuro: No tremors  HEENT: No pain  Cardiovascular: No chest pain, palpitations  Respiratory: No SOB, no cough  GI: No nausea, vomiting, abdominal pain  : No dysuria  Skin: no rash  Psych: no depression  Endocrine: no polyuria, polydipsia  Hem/lymph: no swelling  Osteoporosis: no fractures    PHYSICAL EXAM:  VITALS: T(C): 36.5 (05-30-23 @ 12:00)  T(F): 97.7 (05-30-23 @ 12:00), Max: 98.6 (05-29-23 @ 21:00)  HR: 100 (05-30-23 @ 12:00) (75 - 100)  BP: 121/62 (05-30-23 @ 12:00) (120/61 - 154/71)  RR:  (18 - 18)  SpO2:  (95% - 100%)  Wt(kg): --  GENERAL: NAD  EYES: No proptosis, no lid lag, anicteric  THYROID: Normal size, no palpable nodules  RESPIRATORY: Clear to auscultation bilaterally  CARDIOVASCULAR: Regular rate and rhythm  GI: Soft, nontender, non distended  EXT: b/l feet without wounds; 2+ pulses  PSYCH: Alert and oriented x 3, reactive mood    POCT Blood Glucose.: 403 mg/dL (05-30-23 @ 12:13)  POCT Blood Glucose.: 362 mg/dL (05-30-23 @ 08:44)  POCT Blood Glucose.: 341 mg/dL (05-30-23 @ 06:59)  POCT Blood Glucose.: 89 mg/dL (05-29-23 @ 21:15)  POCT Blood Glucose.: 76 mg/dL (05-29-23 @ 16:15)  POCT Blood Glucose.: 263 mg/dL (05-29-23 @ 12:19)  POCT Blood Glucose.: 315 mg/dL (05-29-23 @ 09:52)  POCT Blood Glucose.: 275 mg/dL (05-29-23 @ 08:30)  POCT Blood Glucose.: 114 mg/dL (05-28-23 @ 21:55)  POCT Blood Glucose.: 262 mg/dL (05-28-23 @ 17:29)  POCT Blood Glucose.: 322 mg/dL (05-28-23 @ 12:41)  POCT Blood Glucose.: 304 mg/dL (05-28-23 @ 08:15)  POCT Blood Glucose.: 253 mg/dL (05-27-23 @ 21:04)  POCT Blood Glucose.: 288 mg/dL (05-27-23 @ 17:03)                            9.5    7.69  )-----------( 385      ( 30 May 2023 06:27 )             28.6       05-30    132<L>  |  93<L>  |  97<H>  ----------------------------<  281<H>  4.4   |  20<L>  |  4.02<H>    eGFR: 16<L>    Ca    8.7      05-30  Mg     3.0     05-30  Phos  4.7     05-30    TPro  6.8  /  Alb  3.8  /  TBili  0.4  /  DBili  x   /  AST  34  /  ALT  21  /  AlkPhos  96  05-30      Thyroid Function Tests:      A1C with Estimated Average Glucose Result: 7.7 % (05-26-23 @ 06:38)          Radiology:                HPI:  DARIEN MCGINNIS is a 68 year old male PMH HTN, 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 Patient last received HD Wednesday 5/24. He makes a very small amount of urine      Consulted for: Steroid Induced hyperglycemia    Patient is tabitha speaking, hx obtained frmo son - in -law    Diabetes History:   Most recent A1c 7.7%  -Diagnosed many years ago  -Endocrinologist - none  -Current Regimen - Not taking DM meds for the past year while on dialysis, FS were well controlled. Prior to that, was on Repaglinide  -FS at home - 120s most times   -Diet - well balanced  -Complications/Diabetes HCM - CKD, s/p renal transplant 5/25  Currently on Prednisone taper      PAST MEDICAL & SURGICAL HISTORY:  DM (diabetes mellitus)      HTN (hypertension)      Chronic kidney disease, unspecified CKD stage      ESRD on dialysis      Pneumonia      Sepsis      Anemia      No significant past surgical history          FAMILY HISTORY:  No pertinent family history in first degree relatives        Social History: No tobacco use    Home Medications:  acetaminophen 325 mg oral tablet: 2 tab(s) orally every 6 hours, As needed, Temp greater or equal to 38C (100.4F), Mild Pain (1 - 3) OTC  (22 Feb 2022 16:53)  hydrALAZINE 10 mg oral tablet: 1 tab(s) orally 3 times a day, As needed, Systolic blood pressure &gt; 170 (22 Feb 2022 15:17)      MEDICATIONS  (STANDING):  calcitriol   Capsule 0.25 MICROGram(s) Oral daily  chlorhexidine 2% Cloths 1 Application(s) Topical daily  dextrose 5%. 1000 milliLiter(s) (100 mL/Hr) IV Continuous <Continuous>  dextrose 5%. 1000 milliLiter(s) (50 mL/Hr) IV Continuous <Continuous>  dextrose 50% Injectable 25 Gram(s) IV Push once  dextrose 50% Injectable 25 Gram(s) IV Push once  dextrose 50% Injectable 12.5 Gram(s) IV Push once  epoetin sree-epbx (RETACRIT) Injectable 54723 Unit(s) SubCutaneous once  famotidine    Tablet 20 milliGRAM(s) Oral daily  gabapentin 100 milliGRAM(s) Oral two times a day  glucagon  Injectable 1 milliGRAM(s) IntraMuscular once  insulin lispro (ADMELOG) corrective regimen sliding scale   SubCutaneous three times a day before meals  insulin lispro (ADMELOG) corrective regimen sliding scale   SubCutaneous at bedtime  insulin lispro Injectable (ADMELOG) 10 Unit(s) SubCutaneous three times a day before meals  mycophenolate mofetil 1 Gram(s) Oral <User Schedule>  NIFEdipine XL 60 milliGRAM(s) Oral daily  nystatin    Suspension 636354 Unit(s) Swish and Swallow four times a day  polyethylene glycol 3350 17 Gram(s) Oral daily  predniSONE   Tablet   Oral   predniSONE   Tablet 10 milliGRAM(s) Oral two times a day  senna 2 Tablet(s) Oral at bedtime  sodium bicarbonate 1300 milliGRAM(s) Oral two times a day  tacrolimus ER Tablet (ENVARSUS XR) 7 milliGRAM(s) Oral <User Schedule>  tamsulosin 0.4 milliGRAM(s) Oral at bedtime  trimethoprim   80 mG/sulfamethoxazole 400 mG 1 Tablet(s) Oral daily  valGANciclovir 450 milliGRAM(s) Oral <User Schedule>    MEDICATIONS  (PRN):  acetaminophen     Tablet .. 650 milliGRAM(s) Oral every 6 hours PRN Mild Pain (1 - 3)  dextrose Oral Gel 15 Gram(s) Oral once PRN Blood Glucose LESS THAN 70 milliGRAM(s)/deciliter  ondansetron Injectable 4 milliGRAM(s) IV Push every 6 hours PRN Nausea and/or Vomiting  traMADol 25 milliGRAM(s) Oral every 6 hours PRN Moderate Pain (4 - 6)  traMADol 50 milliGRAM(s) Oral every 6 hours PRN Severe Pain (7 - 10)      Allergies    No Known Allergies    Intolerances      Review of Systems:  Constitutional: No fever  Eyes: No blurry vision  Neuro: No tremors  HEENT: No pain  Cardiovascular: No chest pain, palpitations  Respiratory: No SOB, no cough  GI: No nausea, vomiting, abdominal pain  : No dysuria  Skin: no rash  Psych: no depression  Endocrine: no polyuria, polydipsia  Hem/lymph: no swelling  Osteoporosis: no fractures    PHYSICAL EXAM:  VITALS: T(C): 36.5 (05-30-23 @ 12:00)  T(F): 97.7 (05-30-23 @ 12:00), Max: 98.6 (05-29-23 @ 21:00)  HR: 100 (05-30-23 @ 12:00) (75 - 100)  BP: 121/62 (05-30-23 @ 12:00) (120/61 - 154/71)  RR:  (18 - 18)  SpO2:  (95% - 100%)  Wt(kg): --  GENERAL: NAD  EYES: No proptosis, no lid lag, anicteric  THYROID: Normal size, no palpable nodules  RESPIRATORY: Clear to auscultation bilaterally  CARDIOVASCULAR: Regular rate and rhythm  GI: Soft, nontender, non distended  EXT: b/l feet without wounds; 2+ pulses  PSYCH: Alert and oriented x 3, reactive mood    POCT Blood Glucose.: 403 mg/dL (05-30-23 @ 12:13)  POCT Blood Glucose.: 362 mg/dL (05-30-23 @ 08:44)  POCT Blood Glucose.: 341 mg/dL (05-30-23 @ 06:59)  POCT Blood Glucose.: 89 mg/dL (05-29-23 @ 21:15)  POCT Blood Glucose.: 76 mg/dL (05-29-23 @ 16:15)  POCT Blood Glucose.: 263 mg/dL (05-29-23 @ 12:19)  POCT Blood Glucose.: 315 mg/dL (05-29-23 @ 09:52)  POCT Blood Glucose.: 275 mg/dL (05-29-23 @ 08:30)  POCT Blood Glucose.: 114 mg/dL (05-28-23 @ 21:55)  POCT Blood Glucose.: 262 mg/dL (05-28-23 @ 17:29)  POCT Blood Glucose.: 322 mg/dL (05-28-23 @ 12:41)  POCT Blood Glucose.: 304 mg/dL (05-28-23 @ 08:15)  POCT Blood Glucose.: 253 mg/dL (05-27-23 @ 21:04)  POCT Blood Glucose.: 288 mg/dL (05-27-23 @ 17:03)                            9.5    7.69  )-----------( 385      ( 30 May 2023 06:27 )             28.6       05-30    132<L>  |  93<L>  |  97<H>  ----------------------------<  281<H>  4.4   |  20<L>  |  4.02<H>    eGFR: 16<L>    Ca    8.7      05-30  Mg     3.0     05-30  Phos  4.7     05-30    TPro  6.8  /  Alb  3.8  /  TBili  0.4  /  DBili  x   /  AST  34  /  ALT  21  /  AlkPhos  96  05-30      Thyroid Function Tests:      A1C with Estimated Average Glucose Result: 7.7 % (05-26-23 @ 06:38)          Radiology:

## 2023-05-31 LAB
ALBUMIN SERPL ELPH-MCNC: 3.9 G/DL — SIGNIFICANT CHANGE UP (ref 3.3–5)
ALP SERPL-CCNC: 90 U/L — SIGNIFICANT CHANGE UP (ref 40–120)
ALT FLD-CCNC: 35 U/L — SIGNIFICANT CHANGE UP (ref 10–45)
ANION GAP SERPL CALC-SCNC: 20 MMOL/L — HIGH (ref 5–17)
AST SERPL-CCNC: 37 U/L — SIGNIFICANT CHANGE UP (ref 10–40)
BASOPHILS # BLD AUTO: 0.01 K/UL — SIGNIFICANT CHANGE UP (ref 0–0.2)
BASOPHILS NFR BLD AUTO: 0.1 % — SIGNIFICANT CHANGE UP (ref 0–2)
BILIRUB SERPL-MCNC: 0.4 MG/DL — SIGNIFICANT CHANGE UP (ref 0.2–1.2)
BUN SERPL-MCNC: 103 MG/DL — HIGH (ref 7–23)
CALCIUM SERPL-MCNC: 8.7 MG/DL — SIGNIFICANT CHANGE UP (ref 8.4–10.5)
CHLORIDE SERPL-SCNC: 96 MMOL/L — SIGNIFICANT CHANGE UP (ref 96–108)
CO2 SERPL-SCNC: 22 MMOL/L — SIGNIFICANT CHANGE UP (ref 22–31)
CREAT SERPL-MCNC: 4.05 MG/DL — HIGH (ref 0.5–1.3)
EGFR: 15 ML/MIN/1.73M2 — LOW
EOSINOPHIL # BLD AUTO: 0 K/UL — SIGNIFICANT CHANGE UP (ref 0–0.5)
EOSINOPHIL NFR BLD AUTO: 0 % — SIGNIFICANT CHANGE UP (ref 0–6)
GLUCOSE BLDC GLUCOMTR-MCNC: 143 MG/DL — HIGH (ref 70–99)
GLUCOSE BLDC GLUCOMTR-MCNC: 145 MG/DL — HIGH (ref 70–99)
GLUCOSE BLDC GLUCOMTR-MCNC: 159 MG/DL — HIGH (ref 70–99)
GLUCOSE BLDC GLUCOMTR-MCNC: 177 MG/DL — HIGH (ref 70–99)
GLUCOSE SERPL-MCNC: 63 MG/DL — LOW (ref 70–99)
HCT VFR BLD CALC: 26.9 % — LOW (ref 39–50)
HGB BLD-MCNC: 8.7 G/DL — LOW (ref 13–17)
IMM GRANULOCYTES NFR BLD AUTO: 1.1 % — HIGH (ref 0–0.9)
LYMPHOCYTES # BLD AUTO: 0.9 K/UL — LOW (ref 1–3.3)
LYMPHOCYTES # BLD AUTO: 10.7 % — LOW (ref 13–44)
MAGNESIUM SERPL-MCNC: 3.1 MG/DL — HIGH (ref 1.6–2.6)
MCHC RBC-ENTMCNC: 31.8 PG — SIGNIFICANT CHANGE UP (ref 27–34)
MCHC RBC-ENTMCNC: 32.3 GM/DL — SIGNIFICANT CHANGE UP (ref 32–36)
MCV RBC AUTO: 98.2 FL — SIGNIFICANT CHANGE UP (ref 80–100)
MONOCYTES # BLD AUTO: 0.87 K/UL — SIGNIFICANT CHANGE UP (ref 0–0.9)
MONOCYTES NFR BLD AUTO: 10.4 % — SIGNIFICANT CHANGE UP (ref 2–14)
NEUTROPHILS # BLD AUTO: 6.52 K/UL — SIGNIFICANT CHANGE UP (ref 1.8–7.4)
NEUTROPHILS NFR BLD AUTO: 77.7 % — HIGH (ref 43–77)
NRBC # BLD: 0 /100 WBCS — SIGNIFICANT CHANGE UP (ref 0–0)
PHOSPHATE SERPL-MCNC: 4.8 MG/DL — HIGH (ref 2.5–4.5)
PLATELET # BLD AUTO: 376 K/UL — SIGNIFICANT CHANGE UP (ref 150–400)
POTASSIUM SERPL-MCNC: 4.1 MMOL/L — SIGNIFICANT CHANGE UP (ref 3.5–5.3)
POTASSIUM SERPL-SCNC: 4.1 MMOL/L — SIGNIFICANT CHANGE UP (ref 3.5–5.3)
PROT SERPL-MCNC: 6.4 G/DL — SIGNIFICANT CHANGE UP (ref 6–8.3)
RBC # BLD: 2.74 M/UL — LOW (ref 4.2–5.8)
RBC # FLD: 16.2 % — HIGH (ref 10.3–14.5)
SODIUM SERPL-SCNC: 138 MMOL/L — SIGNIFICANT CHANGE UP (ref 135–145)
TACROLIMUS SERPL-MCNC: 7.2 NG/ML — SIGNIFICANT CHANGE UP
WBC # BLD: 8.39 K/UL — SIGNIFICANT CHANGE UP (ref 3.8–10.5)
WBC # FLD AUTO: 8.39 K/UL — SIGNIFICANT CHANGE UP (ref 3.8–10.5)

## 2023-05-31 PROCEDURE — 99232 SBSQ HOSP IP/OBS MODERATE 35: CPT

## 2023-05-31 PROCEDURE — 76776 US EXAM K TRANSPL W/DOPPLER: CPT | Mod: 26,RT

## 2023-05-31 PROCEDURE — 99232 SBSQ HOSP IP/OBS MODERATE 35: CPT | Mod: GC

## 2023-05-31 RX ORDER — INSULIN LISPRO 100/ML
VIAL (ML) SUBCUTANEOUS
Refills: 0 | Status: DISCONTINUED | OUTPATIENT
Start: 2023-05-31 | End: 2023-06-01

## 2023-05-31 RX ORDER — TACROLIMUS 5 MG/1
7 CAPSULE ORAL ONCE
Refills: 0 | Status: COMPLETED | OUTPATIENT
Start: 2023-05-31 | End: 2023-05-31

## 2023-05-31 RX ORDER — INSULIN LISPRO 100/ML
VIAL (ML) SUBCUTANEOUS AT BEDTIME
Refills: 0 | Status: DISCONTINUED | OUTPATIENT
Start: 2023-05-31 | End: 2023-06-01

## 2023-05-31 RX ORDER — TACROLIMUS 5 MG/1
7 CAPSULE ORAL
Refills: 0 | Status: DISCONTINUED | OUTPATIENT
Start: 2023-06-01 | End: 2023-06-01

## 2023-05-31 RX ORDER — INSULIN GLARGINE 100 [IU]/ML
4 INJECTION, SOLUTION SUBCUTANEOUS AT BEDTIME
Refills: 0 | Status: DISCONTINUED | OUTPATIENT
Start: 2023-05-31 | End: 2023-06-01

## 2023-05-31 RX ORDER — INSULIN LISPRO 100/ML
VIAL (ML) SUBCUTANEOUS ONCE
Refills: 0 | Status: COMPLETED | OUTPATIENT
Start: 2023-05-31 | End: 2023-05-31

## 2023-05-31 RX ADMIN — CHLORHEXIDINE GLUCONATE 1 APPLICATION(S): 213 SOLUTION TOPICAL at 11:28

## 2023-05-31 RX ADMIN — TRAMADOL HYDROCHLORIDE 25 MILLIGRAM(S): 50 TABLET ORAL at 22:17

## 2023-05-31 RX ADMIN — Medication 8 UNIT(S): at 09:24

## 2023-05-31 RX ADMIN — INSULIN GLARGINE 4 UNIT(S): 100 INJECTION, SOLUTION SUBCUTANEOUS at 22:13

## 2023-05-31 RX ADMIN — GABAPENTIN 100 MILLIGRAM(S): 400 CAPSULE ORAL at 05:29

## 2023-05-31 RX ADMIN — TRAMADOL HYDROCHLORIDE 25 MILLIGRAM(S): 50 TABLET ORAL at 23:00

## 2023-05-31 RX ADMIN — Medication 500000 UNIT(S): at 05:28

## 2023-05-31 RX ADMIN — TACROLIMUS 7 MILLIGRAM(S): 5 CAPSULE ORAL at 13:20

## 2023-05-31 RX ADMIN — MYCOPHENOLATE MOFETIL 1 GRAM(S): 250 CAPSULE ORAL at 09:22

## 2023-05-31 RX ADMIN — Medication 1300 MILLIGRAM(S): at 17:30

## 2023-05-31 RX ADMIN — FAMOTIDINE 20 MILLIGRAM(S): 10 INJECTION INTRAVENOUS at 11:25

## 2023-05-31 RX ADMIN — Medication 1 TABLET(S): at 11:25

## 2023-05-31 RX ADMIN — GABAPENTIN 100 MILLIGRAM(S): 400 CAPSULE ORAL at 17:30

## 2023-05-31 RX ADMIN — Medication 2: at 13:21

## 2023-05-31 RX ADMIN — Medication 500000 UNIT(S): at 11:25

## 2023-05-31 RX ADMIN — Medication 8 UNIT(S): at 17:29

## 2023-05-31 RX ADMIN — VALGANCICLOVIR 450 MILLIGRAM(S): 450 TABLET, FILM COATED ORAL at 09:22

## 2023-05-31 RX ADMIN — TAMSULOSIN HYDROCHLORIDE 0.4 MILLIGRAM(S): 0.4 CAPSULE ORAL at 22:13

## 2023-05-31 RX ADMIN — Medication 1300 MILLIGRAM(S): at 05:29

## 2023-05-31 RX ADMIN — POLYETHYLENE GLYCOL 3350 17 GRAM(S): 17 POWDER, FOR SOLUTION ORAL at 11:25

## 2023-05-31 RX ADMIN — SENNA PLUS 2 TABLET(S): 8.6 TABLET ORAL at 22:14

## 2023-05-31 RX ADMIN — MYCOPHENOLATE MOFETIL 1 GRAM(S): 250 CAPSULE ORAL at 20:53

## 2023-05-31 RX ADMIN — Medication 1 ENEMA: at 17:28

## 2023-05-31 RX ADMIN — Medication 8 UNIT(S): at 13:20

## 2023-05-31 RX ADMIN — TRAMADOL HYDROCHLORIDE 50 MILLIGRAM(S): 50 TABLET ORAL at 10:22

## 2023-05-31 RX ADMIN — CALCITRIOL 0.25 MICROGRAM(S): 0.5 CAPSULE ORAL at 11:25

## 2023-05-31 RX ADMIN — Medication 0: at 22:13

## 2023-05-31 RX ADMIN — Medication 1: at 17:29

## 2023-05-31 RX ADMIN — Medication 500000 UNIT(S): at 17:29

## 2023-05-31 RX ADMIN — Medication 5 MILLIGRAM(S): at 17:29

## 2023-05-31 RX ADMIN — TRAMADOL HYDROCHLORIDE 50 MILLIGRAM(S): 50 TABLET ORAL at 09:22

## 2023-05-31 RX ADMIN — Medication 5 MILLIGRAM(S): at 05:29

## 2023-05-31 NOTE — PROVIDER CONTACT NOTE (OTHER) - BACKGROUND
69 y/o M, PMH HTN, ESRD 2/2 DM nephropathy requiring iHD, last HD 5/24, s/p DDRT w/ Simulect induction on 5/25/23

## 2023-05-31 NOTE — PROGRESS NOTE ADULT - PROBLEM SELECTOR PLAN 1
- Test BGs ACTID and at HS while on oral diet, q 6 hours when NPO  - Low Admelog correction scale ACTID and low Admelog correction scale at HS  - Will decrease Lantus to 4 unit to avoid hypoglycemia   - Will continue premeal Admelog 8 units ACTID, HOLD IF NPO  - Hypoglycemia protocol in place   - Consistent carb diet    Discharge plan:   - Likely basal/bolus vs. basal/oral. Will decide closer to d/c based on BG trends, insulin requirement and oral intake  -Can fu with [facility]: [address] [place] [address]; Ph # [phone]

## 2023-05-31 NOTE — PROGRESS NOTE ADULT - SUBJECTIVE AND OBJECTIVE BOX
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 Patient last received HD Wednesday 5/24. He makes a very small amount of urine.    Present: Patient seen and examined with multidisciplinary team including Transplant Surgeon: Dr. Polk,  Transplant Nephrologist: Dr. MARQUISE Garcia, FRANCINE Owens  and unit RN during am rounds.  Disciplines not in attendance will be notified of the plan.     Interval Events:  - POD#6 DDRT  - RUE swelling, duplex from 5/30 w/ superficial thrombophlebitis, no DVT  - BGMs better controlled  - Ambulating in hallway  - Return of bowel function  - Pain well controlled    Immunosuppression:   Induction:  Simulect                                        Maintenance Immunosuppression: Env by level, MMF 1g BID, SST   Ongoing monitoring for signs of rejection.    Potential Discharge date: Pending clinical course   Education:  Medications  Plan of care:  See Below        MEDICATIONS  (STANDING):  calcitriol   Capsule 0.25 MICROGram(s) Oral daily  chlorhexidine 2% Cloths 1 Application(s) Topical daily  dextrose 5%. 1000 milliLiter(s) (100 mL/Hr) IV Continuous <Continuous>  dextrose 5%. 1000 milliLiter(s) (50 mL/Hr) IV Continuous <Continuous>  dextrose 50% Injectable 12.5 Gram(s) IV Push once  dextrose 50% Injectable 25 Gram(s) IV Push once  dextrose 50% Injectable 25 Gram(s) IV Push once  famotidine    Tablet 20 milliGRAM(s) Oral daily  gabapentin 100 milliGRAM(s) Oral two times a day  glucagon  Injectable 1 milliGRAM(s) IntraMuscular once  insulin glargine Injectable (LANTUS) 10 Unit(s) SubCutaneous at bedtime  insulin lispro (ADMELOG) corrective regimen sliding scale   SubCutaneous at bedtime  insulin lispro (ADMELOG) corrective regimen sliding scale   SubCutaneous three times a day before meals  insulin lispro (ADMELOG) corrective regimen sliding scale   SubCutaneous once  insulin lispro Injectable (ADMELOG) 8 Unit(s) SubCutaneous three times a day before meals  mycophenolate mofetil 1 Gram(s) Oral <User Schedule>  NIFEdipine XL 60 milliGRAM(s) Oral daily  nystatin    Suspension 989072 Unit(s) Swish and Swallow four times a day  polyethylene glycol 3350 17 Gram(s) Oral daily  predniSONE   Tablet   Oral   predniSONE   Tablet 5 milliGRAM(s) Oral two times a day  senna 2 Tablet(s) Oral at bedtime  sodium bicarbonate 1300 milliGRAM(s) Oral two times a day  tamsulosin 0.4 milliGRAM(s) Oral at bedtime  trimethoprim   80 mG/sulfamethoxazole 400 mG 1 Tablet(s) Oral daily  valGANciclovir 450 milliGRAM(s) Oral <User Schedule>    MEDICATIONS  (PRN):  acetaminophen     Tablet .. 650 milliGRAM(s) Oral every 6 hours PRN Mild Pain (1 - 3)  dextrose Oral Gel 15 Gram(s) Oral once PRN Blood Glucose LESS THAN 70 milliGRAM(s)/deciliter  ondansetron Injectable 4 milliGRAM(s) IV Push every 6 hours PRN Nausea and/or Vomiting  traMADol 25 milliGRAM(s) Oral every 6 hours PRN Moderate Pain (4 - 6)  traMADol 50 milliGRAM(s) Oral every 6 hours PRN Severe Pain (7 - 10)      PAST MEDICAL & SURGICAL HISTORY:  DM (diabetes mellitus)      HTN (hypertension)      Chronic kidney disease, unspecified CKD stage      ESRD on dialysis      Pneumonia      Sepsis      Anemia      No significant past surgical history          Vital Signs Last 24 Hrs  T(C): 36.7 (31 May 2023 05:03), Max: 36.8 (30 May 2023 17:00)  T(F): 98.1 (31 May 2023 05:03), Max: 98.2 (30 May 2023 17:00)  HR: 100 (31 May 2023 05:03) (94 - 100)  BP: 116/58 (31 May 2023 05:03) (116/58 - 154/71)  BP(mean): 79 (31 May 2023 05:03) (79 - 83)  RR: 18 (31 May 2023 05:03) (18 - 18)  SpO2: 99% (31 May 2023 05:03) (99% - 100%)    Parameters below as of 31 May 2023 05:03  Patient On (Oxygen Delivery Method): room air        I&O's Summary    30 May 2023 07:01  -  31 May 2023 07:00  --------------------------------------------------------  IN: 1600 mL / OUT: 375 mL / NET: 1225 mL                              8.7    8.39  )-----------( 376      ( 31 May 2023 06:51 )             26.9     05-31    138  |  96  |  103<H>  ----------------------------<  63<L>  4.1   |  22  |  4.05<H>    Ca    8.7      31 May 2023 06:49  Phos  4.8     05-31  Mg     3.1     05-31    TPro  6.4  /  Alb  3.9  /  TBili  0.4  /  DBili  x   /  AST  37  /  ALT  35  /  AlkPhos  90  05-31    Tacrolimus (), Serum: 11.6 ng/mL (05-30 @ 06:27)            PHYSICAL EXAM:  Constitutional: Well developed / well nourished  Eyes: Anicteric, PERRLA  ENMT: nc/at  Neck: Supple, trachea midline;   Respiratory: CTA B/L  Cardiovascular: RRR  Gastrointestinal: Soft, non distended, mild tenderness at the incision site; RLQ incision closed with minimal SS output c/d/i  Genitourinary: Voiding spontaneously  Extremities: SCD's in place and working bilaterally, AVF   Vascular: Palpable dp pulses bilaterally  Neurological: A&O x3  Skin: no rashes, ulcerations or lesions;  Musculoskeletal: Moving all extremities  Psychiatric: Responsive

## 2023-05-31 NOTE — PROGRESS NOTE ADULT - SUBJECTIVE AND OBJECTIVE BOX
Patient is a 68y Male whom presented to the hospital with esrd on hd    PAST MEDICAL & SURGICAL HISTORY:  DM (diabetes mellitus)      HTN (hypertension)      Chronic kidney disease, unspecified CKD stage      ESRD on dialysis      Pneumonia      Sepsis      Anemia      No significant past surgical history          MEDICATIONS  (STANDING):  basiliximab  IVPB 20 milliGRAM(s) IV Intermittent once  calcitriol   Capsule 0.25 MICROGram(s) Oral daily  chlorhexidine 2% Cloths 1 Application(s) Topical daily  dextrose 5%. 1000 milliLiter(s) (100 mL/Hr) IV Continuous <Continuous>  dextrose 5%. 1000 milliLiter(s) (50 mL/Hr) IV Continuous <Continuous>  dextrose 50% Injectable 25 Gram(s) IV Push once  dextrose 50% Injectable 12.5 Gram(s) IV Push once  dextrose 50% Injectable 25 Gram(s) IV Push once  famotidine    Tablet 20 milliGRAM(s) Oral daily  gabapentin 100 milliGRAM(s) Oral two times a day  glucagon  Injectable 1 milliGRAM(s) IntraMuscular once  insulin lispro (ADMELOG) corrective regimen sliding scale   SubCutaneous three times a day before meals  insulin lispro (ADMELOG) corrective regimen sliding scale   SubCutaneous at bedtime  methylPREDNISolone sodium succinate Injectable   IV Push   mycophenolate mofetil 1 Gram(s) Oral <User Schedule>  NIFEdipine XL 30 milliGRAM(s) Oral daily  nystatin    Suspension 852332 Unit(s) Swish and Swallow four times a day  polyethylene glycol 3350 17 Gram(s) Oral daily  repaglinide 1 milliGRAM(s) Oral three times a day before meals  senna 2 Tablet(s) Oral at bedtime  trimethoprim   80 mG/sulfamethoxazole 400 mG 1 Tablet(s) Oral daily  valGANciclovir 450 milliGRAM(s) Oral <User Schedule>      Allergies    No Known Allergies    Intolerances        SOCIAL HISTORY:  Denies ETOh,Smoking,     FAMILY HISTORY:  No pertinent family history in first degree relatives        REVIEW OF SYSTEMS:    CONSTITUTIONAL: No weakness, fevers or chills  EYES/ENT: No visual changes;  no throat pain   NECK: No pain or stiffness  RESPIRATORY: No cough, wheezing, hemoptysis; No shortness of breath  CARDIOVASCULAR: No chest pain or palpitations  GASTROINTESTINAL: No abdominal or epigastric pain. No nausea, vomiting,     No diarrhea or constipation. No melena                                                                  8.7    8.39  )-----------( 376      ( 31 May 2023 06:51 )             26.9       CBC Full  -  ( 31 May 2023 06:51 )  WBC Count : 8.39 K/uL  RBC Count : 2.74 M/uL  Hemoglobin : 8.7 g/dL  Hematocrit : 26.9 %  Platelet Count - Automated : 376 K/uL  Mean Cell Volume : 98.2 fl  Mean Cell Hemoglobin : 31.8 pg  Mean Cell Hemoglobin Concentration : 32.3 gm/dL  Auto Neutrophil # : 6.52 K/uL  Auto Lymphocyte # : 0.90 K/uL  Auto Monocyte # : 0.87 K/uL  Auto Eosinophil # : 0.00 K/uL  Auto Basophil # : 0.01 K/uL  Auto Neutrophil % : 77.7 %  Auto Lymphocyte % : 10.7 %  Auto Monocyte % : 10.4 %  Auto Eosinophil % : 0.0 %  Auto Basophil % : 0.1 %      05-31    138  |  96  |  103<H>  ----------------------------<  63<L>  4.1   |  22  |  4.05<H>    Ca    8.7      31 May 2023 06:49  Phos  4.8     05-31  Mg     3.1     05-31    TPro  6.4  /  Alb  3.9  /  TBili  0.4  /  DBili  x   /  AST  37  /  ALT  35  /  AlkPhos  90  05-31      CAPILLARY BLOOD GLUCOSE      POCT Blood Glucose.: 159 mg/dL (31 May 2023 17:12)  POCT Blood Glucose.: 177 mg/dL (31 May 2023 12:59)  POCT Blood Glucose.: 143 mg/dL (31 May 2023 09:09)  POCT Blood Glucose.: 150 mg/dL (30 May 2023 21:11)      Vital Signs Last 24 Hrs  T(C): 36.9 (31 May 2023 17:15), Max: 36.9 (31 May 2023 17:15)  T(F): 98.4 (31 May 2023 17:15), Max: 98.4 (31 May 2023 17:15)  HR: 95 (31 May 2023 17:15) (81 - 100)  BP: 145/68 (31 May 2023 17:15) (116/58 - 174/86)  BP(mean): 98 (31 May 2023 17:15) (79 - 98)  RR: 18 (31 May 2023 17:15) (18 - 20)  SpO2: 99% (31 May 2023 17:15) (98% - 100%)    Parameters below as of 31 May 2023 17:15  Patient On (Oxygen Delivery Method): room air              PHYSICAL EXAM:    Constitutional: NAD  HEENT: conjunctive   clear   Neck:  No JVD  Respiratory: CTAB  Cardiovascular: S1 and S2  Gastrointestinal: BS+, soft, NT/ND  Extremities: No peripheral edema  Neurological: A/O x 3, no focal deficits  Psychiatric: Normal mood, normal affect  : pos  [place]  Skin: No rashes  Access: Not applicable

## 2023-05-31 NOTE — PROGRESS NOTE ADULT - PROBLEM SELECTOR PLAN 4
LDL goal < 70  Can check lipid profile outpatient LDL goal < 70  Consider moderate intensity statin.   Managed by primary team   Can check lipid profile outpatient

## 2023-05-31 NOTE — PROGRESS NOTE ADULT - SUBJECTIVE AND OBJECTIVE BOX
[facility] DIVISION OF KIDNEY DISEASES AND HYPERTENSION -- FOLLOW UP NOTE  --------------------------------------------------------------------------------  Chief Complaint:    24 hour events/subjective:  Patient was seen and examined at bedside  Blood sugars fluctuating, BUN has increased.         PAST HISTORY  --------------------------------------------------------------------------------  No significant changes to PMH, PSH, FHx, SHx, unless otherwise noted    ALLERGIES & MEDICATIONS  --------------------------------------------------------------------------------  Allergies    No Known Allergies    Intolerances        REVIEW OF SYSTEMS  --------------------------------------------------------------------------------  Gen: + fatigue, no fevers/chills, weakness  Skin: No rashes  Head/Eyes/Ears/Mouth: No headache;No sore throat  Respiratory: No dyspnea, cough,   CV: No chest pain, PND, orthopnea  GI: No abdominal pain, diarrhea, constipation, nausea, vomiting  Transplant: No pain  : urinary frequency.  MSK: No joint pain/swelling; no back pain; no edema  Neuro: No dizziness/lightheadedness, weakness, seizures, numbness, tingling  Psych: No significant nervousness, anxiety, stress, depression    All other systems were reviewed and are negative, except as noted.    MEDICATIONS  (STANDING):  calcitriol   Capsule 0.25 MICROGram(s) Oral daily  chlorhexidine 2% Cloths 1 Application(s) Topical daily  dextrose 5%. 1000 milliLiter(s) (50 mL/Hr) IV Continuous <Continuous>  dextrose 5%. 1000 milliLiter(s) (100 mL/Hr) IV Continuous <Continuous>  dextrose 50% Injectable 25 Gram(s) IV Push once  dextrose 50% Injectable 12.5 Gram(s) IV Push once  dextrose 50% Injectable 25 Gram(s) IV Push once  famotidine    Tablet 20 milliGRAM(s) Oral daily  gabapentin 100 milliGRAM(s) Oral two times a day  glucagon  Injectable 1 milliGRAM(s) IntraMuscular once  insulin glargine Injectable (LANTUS) 10 Unit(s) SubCutaneous at bedtime  insulin lispro (ADMELOG) corrective regimen sliding scale   SubCutaneous three times a day before meals  insulin lispro (ADMELOG) corrective regimen sliding scale   SubCutaneous at bedtime  insulin lispro Injectable (ADMELOG) 8 Unit(s) SubCutaneous three times a day before meals  mycophenolate mofetil 1 Gram(s) Oral <User Schedule>  NIFEdipine XL 60 milliGRAM(s) Oral daily  nystatin    Suspension 988572 Unit(s) Swish and Swallow four times a day  polyethylene glycol 3350 17 Gram(s) Oral daily  predniSONE   Tablet   Oral   predniSONE   Tablet 5 milliGRAM(s) Oral two times a day  senna 2 Tablet(s) Oral at bedtime  sodium bicarbonate 1300 milliGRAM(s) Oral two times a day  tacrolimus ER Tablet (ENVARSUS XR) 7 milliGRAM(s) Oral once  tamsulosin 0.4 milliGRAM(s) Oral at bedtime  trimethoprim   80 mG/sulfamethoxazole 400 mG 1 Tablet(s) Oral daily  valGANciclovir 450 milliGRAM(s) Oral <User Schedule>    MEDICATIONS  (PRN):  acetaminophen     Tablet .. 650 milliGRAM(s) Oral every 6 hours PRN Mild Pain (1 - 3)  dextrose Oral Gel 15 Gram(s) Oral once PRN Blood Glucose LESS THAN 70 milliGRAM(s)/deciliter  ondansetron Injectable 4 milliGRAM(s) IV Push every 6 hours PRN Nausea and/or Vomiting  traMADol 25 milliGRAM(s) Oral every 6 hours PRN Moderate Pain (4 - 6)  traMADol 50 milliGRAM(s) Oral every 6 hours PRN Severe Pain (7 - 10)      Vital Signs Last 24 Hrs  T(C): 36.8 (31 May 2023 09:45), Max: 36.8 (30 May 2023 17:00)  T(F): 98.3 (31 May 2023 09:45), Max: 98.3 (31 May 2023 09:45)  HR: 81 (31 May 2023 09:45) (81 - 100)  BP: 174/86 (31 May 2023 09:45) (116/58 - 174/86)  BP(mean): 79 (31 May 2023 05:03) (79 - 83)  RR: 18 (31 May 2023 09:45) (18 - 18)  SpO2: 98% (31 May 2023 09:45) (98% - 100%)    Parameters below as of 31 May 2023 09:45  Patient On (Oxygen Delivery Method): room air        I&O's Summary    30 May 2023 07:01  -  31 May 2023 07:00  --------------------------------------------------------  IN: 1600 mL / OUT: 375 mL / NET: 1225 mL      Physical Exam:  	Gen: NAD  	HEENT: PERRL, supple neck, clear oropharynx  	Pulm: CTA B/L  	CV: RRR, S1S2; no rub  	Back: No spinal or CVA tenderness; no sacral edema  	Abd: +BS, soft, nontender/nondistended                      Transplant: No tenderness, swelling, incision c/d/i  	: No suprapubic tenderness  	UE: RUE edema  	LE: Warm, FROM; no edema  	Neuro: No focal deficits  	Psych: Normal affect and mood  	Skin: Warm, without rashes      LABS/STUDIES  --------------------------------------------------------------------------------                                                      8.7    8.39  )-----------( 376      ( 31 May 2023 06:51 )             26.9     05-31    138  |  96  |  103<H>  ----------------------------<  63<L>  4.1   |  22  |  4.05<H>    Ca    8.7      31 May 2023 06:49  Phos  4.8     05-31  Mg     3.1     05-31    TPro  6.4  /  Alb  3.9  /  TBili  0.4  /  DBili  x   /  AST  37  /  ALT  35  /  AlkPhos  90  05-31    Tacrolimus (), Serum: 7.2 ng/mL (05-31 @ 06:51)

## 2023-05-31 NOTE — PHARMACY EDUCATION NOTE - EDUCATION SUMMARY
Discharge immunosuppressant medications and prophylactic anti-infective agents reviewed with the patient. Outpatient medication schedule was discussed in detail including: medication name, indication, dose, administration times, treatment duration, side effects, drug interactions, and special instructions. Patient questions and concerns were answered and addressed. Patient demonstrated understanding. Patient's daughter Kaitlin will be primary caretaker.

## 2023-05-31 NOTE — PROVIDER CONTACT NOTE (OTHER) - ASSESSMENT
Pt A&Ox4, VS as charted. Pt c/o 5/10 pain at incision site, R side of abdomen above hip very firm upon palpation. Pt denies pain at this site when palpated. No discoloration or ecchymosis at site. Abdomen appears symmetrical

## 2023-05-31 NOTE — PROGRESS NOTE ADULT - SUBJECTIVE AND OBJECTIVE BOX
Diabetes Follow Up Note: Seen at bedside earlier with [facility]  [id number])    Chief complain: Uncontrolled type 2 DM and steroid induced hyperglycemia     interval Hx: Tolerating diet well . Last BG values labile 100s-403 and noted BG 63 in BMP with AM finger stick was 143. As per pt, he did not have any hypoglycemic symptoms this am. On prednisone taper ( 5 mg BID today then 5 mg daily starting tomorrow).     Review of systems:   CONSTITUTIONAL:  Feels well, good appetite  CARDIOVASCULAR:  Negative for chest pain or palpitations  RESPIRATORY:  Negative for cough, or SOB   GASTROINTESTINAL:  Negative for nausea, vomiting, or abdominal pain  GENITOURINARY:  urinating in urinal, denies hematuria.    Endocrine: No polyuria, polydipsia or S &Sx of hypoglycemia     Allergies  No Known Allergies      Medications  dextrose 5%. 1000 milliLiter(s) IV Continuous <Continuous>  dextrose 5%. 1000 milliLiter(s) IV Continuous <Continuous>  dextrose 50% Injectable 25 Gram(s) IV Push once  dextrose 50% Injectable 25 Gram(s) IV Push once  dextrose 50% Injectable 12.5 Gram(s) IV Push once  dextrose Oral Gel 15 Gram(s) Oral once PRN  glucagon  Injectable 1 milliGRAM(s) IntraMuscular once  insulin glargine Injectable (LANTUS) 4 Unit(s) SubCutaneous at bedtime  insulin lispro (ADMELOG) corrective regimen sliding scale   SubCutaneous at bedtime  insulin lispro (ADMELOG) corrective regimen sliding scale   SubCutaneous three times a day before meals  insulin lispro Injectable (ADMELOG) 8 Unit(s) SubCutaneous three times a day before meals  predniSONE   Tablet   Oral   predniSONE   Tablet 5 milliGRAM(s) Oral two times a day      PHYSICAL EXAM:  T(C): 36.4 (05-31-23 @ 14:43), Max: 36.8 (05-30-23 @ 17:00)  HR: 95 (05-31-23 @ 14:43) (81 - 100)  BP: 143/71 (05-31-23 @ 14:43) (116/58 - 174/86)  RR: 20 (05-31-23 @ 14:43) (18 - 20)  SpO2: 99% (05-31-23 @ 14:43) (98% - 100%)    General: NAD, well- developed  abdomen: Soft, Nontender, Nondistended; Bowel sounds present  Extremities: 2+ Peripheral Pulses, No clubbing, cyanosis, or edema, ROM WNL   Neuro: No focal neurologic deficit. A & O X3  SKIN: RLQ abomen surgery site staples intact.       POCT Blood Glucose.: 177 mg/dL (05-31-23 @ 12:59)  POCT Blood Glucose.: 143 mg/dL (05-31-23 @ 09:09)  POCT Blood Glucose.: 150 mg/dL (05-30-23 @ 21:11)  POCT Blood Glucose.: 231 mg/dL (05-30-23 @ 17:13)  POCT Blood Glucose.: 403 mg/dL (05-30-23 @ 12:13)  POCT Blood Glucose.: 362 mg/dL (05-30-23 @ 08:44)  POCT Blood Glucose.: 341 mg/dL (05-30-23 @ 06:59)  POCT Blood Glucose.: 89 mg/dL (05-29-23 @ 21:15)  POCT Blood Glucose.: 76 mg/dL (05-29-23 @ 16:15)  POCT Blood Glucose.: 263 mg/dL (05-29-23 @ 12:19)  POCT Blood Glucose.: 315 mg/dL (05-29-23 @ 09:52)  POCT Blood Glucose.: 275 mg/dL (05-29-23 @ 08:30)  POCT Blood Glucose.: 114 mg/dL (05-28-23 @ 21:55)  POCT Blood Glucose.: 262 mg/dL (05-28-23 @ 17:29)    A1C with Estimated Average Glucose Result: 7.7 % (05-26-23 @ 06:38)                         8.7    8.39  )-----------( 376      ( 31 May 2023 06:51 )             26.9     05-31    138  |  96  |  103<H>  ----------------------------<  63<L>  4.1   |  22  |  4.05<H>    Ca    8.7      31 May 2023 06:49  Phos  4.8     05-31  Mg     3.1     05-31    TPro  6.4  /  Alb  3.9  /  TBili  0.4  /  DBili  x   /  AST  37  /  ALT  35  /  AlkPhos  90  05-31

## 2023-05-31 NOTE — PHARMACY EDUCATION NOTE - LEARNER
Detail Level: Detailed Detail Level: Generalized Detail Level: Simple Patient/Family member Sunscreen Recommendations: Mineral based Skin Checks Recommendations: Mineral sunscreen

## 2023-05-31 NOTE — PROGRESS NOTE ADULT - ASSESSMENT
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 Endocrine following for Uncontrolled T2DM and steroid induced hyperglycemia. BG goal 100- 180 mg/dl.     BG values variable 100s-403, and noted BG 63 in BMP with AM finger stick was 143 , received Lantus 10 units last night. Now on prednisone taper ( 5 mg BID today then 5 mg daily starting tomorrow).     Home regimen: Not taking DM meds for the past year while on dialysis, FS were well controlled. Prior to that, was on Repaglinide 0.5 mg BID

## 2023-05-31 NOTE — PROGRESS NOTE ADULT - ASSESSMENT
Per pts mother, pt was wheezing and couldn't breathe when she woke up. Pt's mother states pt started coughing today after school.    68 year old male with  ESRD on HD, DM, HTN, and anemia presenting for kidney transplant.   Donor- KDPI 96%, Terminal Cr 0.8  Last IHD was on 5/24/23    PLAN  1.s/p DDRT on 5/24/23- ATN slowly improving.  Has good UOP but creatinine stuck at 4 with rising BUN.  Check renal sonogram and PVR.     2. IS meds- Simulect Induction .Dosing tac by level. goal level 8-10 . MMF 1gm po bid and steroid taper per protocol  3. Hyperkalemia- resolved  4. HTN - pressure ok, fluctuates.   5. Hypocalcemia Calcitriol was resumed.   6.  Diabetes 2 - f/u with endocrine recs

## 2023-05-31 NOTE — PROVIDER CONTACT NOTE (OTHER) - SITUATION
P R side of abdomen above hip very firm when palpated. Incision site is soft to the touch, pt c/o 5/10 pain at incision site

## 2023-05-31 NOTE — PROGRESS NOTE ADULT - ASSESSMENT
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 Patient last received HD Wednesday 5/24. He makes a very small amount of urine. now s/p DDRT (1a/1v/1u + stent) with Simulect induction on 5/25/23.     #S/P DDRT (1a/1v/1u + stent)  - Txp US unable to visualize anastomosis but visualized flow within kidney itself  - CC/Renal diet  - Cr stable, UOP improving   - Strict I&Os, griffith   - PRN pain regimen  - Bowel regimen  - OOBTC, ambulate in hallway  -RUE duplex 5/30: superficial thrombophelbitis    #Immunosuppression  - Simulect induction  - Env by level, MMF 1g BID, SST  - PPx: Valcyte, Bactrim, Nystatin, Pepcid    #HTN  - Continue Nifedipine 30mg QD  - Increase as indicated for SBP >170    #NIDDM  - Endocrine consult placed, recs appreciated  - Home meds: Prandin 0.5mg BID    DARIEN MCGINNIS is a 68 year old male PMH HTN, 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 Patient last received HD Wednesday 5/24. He makes a very small amount of urine. now s/p DDRT (1a/1v/1u + stent) with Simulect induction on 5/25/23.     #S/P DDRT (1a/1v/1u + stent)  - PVR today  - Will repeat Doppler US of renal graft  - Hold AM tacro  - Txp US unable to visualize anastomosis but visualized flow within kidney itself  - CC/Renal diet  - Cr stable, UOP improving   - Strict I&Os, griffith   - PRN pain regimen  - Bowel regimen  - OOBTC, ambulate in hallway  -RUE duplex 5/30: superficial thrombophelbitis    #Immunosuppression  - Simulect induction  - Env by level, MMF 1g BID, SST  - PPx: Valcyte, Bactrim, Nystatin, Pepcid    #HTN  - Continue Nifedipine 30mg QD  - Increase as indicated for SBP >170    #NIDDM  - Endocrine consult placed, recs appreciated  - Home meds: Prandin 0.5mg BID

## 2023-06-01 ENCOUNTER — TRANSCRIPTION ENCOUNTER (OUTPATIENT)
Age: 68
End: 2023-06-01

## 2023-06-01 VITALS
DIASTOLIC BLOOD PRESSURE: 72 MMHG | OXYGEN SATURATION: 100 % | TEMPERATURE: 98 F | RESPIRATION RATE: 17 BRPM | HEART RATE: 93 BPM | SYSTOLIC BLOOD PRESSURE: 118 MMHG

## 2023-06-01 LAB
ALBUMIN SERPL ELPH-MCNC: 3.5 G/DL — SIGNIFICANT CHANGE UP (ref 3.3–5)
ALP SERPL-CCNC: 90 U/L — SIGNIFICANT CHANGE UP (ref 40–120)
ALT FLD-CCNC: 31 U/L — SIGNIFICANT CHANGE UP (ref 10–45)
ANION GAP SERPL CALC-SCNC: 17 MMOL/L — SIGNIFICANT CHANGE UP (ref 5–17)
AST SERPL-CCNC: 34 U/L — SIGNIFICANT CHANGE UP (ref 10–40)
BASOPHILS # BLD AUTO: 0.01 K/UL — SIGNIFICANT CHANGE UP (ref 0–0.2)
BASOPHILS NFR BLD AUTO: 0.1 % — SIGNIFICANT CHANGE UP (ref 0–2)
BILIRUB SERPL-MCNC: 0.3 MG/DL — SIGNIFICANT CHANGE UP (ref 0.2–1.2)
BUN SERPL-MCNC: 91 MG/DL — HIGH (ref 7–23)
CALCIUM SERPL-MCNC: 8.6 MG/DL — SIGNIFICANT CHANGE UP (ref 8.4–10.5)
CHLORIDE SERPL-SCNC: 98 MMOL/L — SIGNIFICANT CHANGE UP (ref 96–108)
CO2 SERPL-SCNC: 19 MMOL/L — LOW (ref 22–31)
CREAT SERPL-MCNC: 3.38 MG/DL — HIGH (ref 0.5–1.3)
EGFR: 19 ML/MIN/1.73M2 — LOW
EOSINOPHIL # BLD AUTO: 0.08 K/UL — SIGNIFICANT CHANGE UP (ref 0–0.5)
EOSINOPHIL NFR BLD AUTO: 0.8 % — SIGNIFICANT CHANGE UP (ref 0–6)
GLUCOSE BLDC GLUCOMTR-MCNC: 136 MG/DL — HIGH (ref 70–99)
GLUCOSE BLDC GLUCOMTR-MCNC: 150 MG/DL — HIGH (ref 70–99)
GLUCOSE SERPL-MCNC: 86 MG/DL — SIGNIFICANT CHANGE UP (ref 70–99)
HCT VFR BLD CALC: 27 % — LOW (ref 39–50)
HGB BLD-MCNC: 8.7 G/DL — LOW (ref 13–17)
IMM GRANULOCYTES NFR BLD AUTO: 1.2 % — HIGH (ref 0–0.9)
LYMPHOCYTES # BLD AUTO: 1.66 K/UL — SIGNIFICANT CHANGE UP (ref 1–3.3)
LYMPHOCYTES # BLD AUTO: 16 % — SIGNIFICANT CHANGE UP (ref 13–44)
MAGNESIUM SERPL-MCNC: 2.9 MG/DL — HIGH (ref 1.6–2.6)
MCHC RBC-ENTMCNC: 31.8 PG — SIGNIFICANT CHANGE UP (ref 27–34)
MCHC RBC-ENTMCNC: 32.2 GM/DL — SIGNIFICANT CHANGE UP (ref 32–36)
MCV RBC AUTO: 98.5 FL — SIGNIFICANT CHANGE UP (ref 80–100)
MONOCYTES # BLD AUTO: 1.03 K/UL — HIGH (ref 0–0.9)
MONOCYTES NFR BLD AUTO: 9.9 % — SIGNIFICANT CHANGE UP (ref 2–14)
NEUTROPHILS # BLD AUTO: 7.46 K/UL — HIGH (ref 1.8–7.4)
NEUTROPHILS NFR BLD AUTO: 72 % — SIGNIFICANT CHANGE UP (ref 43–77)
NRBC # BLD: 0 /100 WBCS — SIGNIFICANT CHANGE UP (ref 0–0)
PHOSPHATE SERPL-MCNC: 4.5 MG/DL — SIGNIFICANT CHANGE UP (ref 2.5–4.5)
PLATELET # BLD AUTO: 370 K/UL — SIGNIFICANT CHANGE UP (ref 150–400)
POTASSIUM SERPL-MCNC: 4.4 MMOL/L — SIGNIFICANT CHANGE UP (ref 3.5–5.3)
POTASSIUM SERPL-SCNC: 4.4 MMOL/L — SIGNIFICANT CHANGE UP (ref 3.5–5.3)
PROT SERPL-MCNC: 6 G/DL — SIGNIFICANT CHANGE UP (ref 6–8.3)
RBC # BLD: 2.74 M/UL — LOW (ref 4.2–5.8)
RBC # FLD: 16.3 % — HIGH (ref 10.3–14.5)
SODIUM SERPL-SCNC: 134 MMOL/L — LOW (ref 135–145)
TACROLIMUS SERPL-MCNC: 7.8 NG/ML — SIGNIFICANT CHANGE UP
WBC # BLD: 10.36 K/UL — SIGNIFICANT CHANGE UP (ref 3.8–10.5)
WBC # FLD AUTO: 10.36 K/UL — SIGNIFICANT CHANGE UP (ref 3.8–10.5)

## 2023-06-01 PROCEDURE — 86704 HEP B CORE ANTIBODY TOTAL: CPT

## 2023-06-01 PROCEDURE — 83036 HEMOGLOBIN GLYCOSYLATED A1C: CPT

## 2023-06-01 PROCEDURE — 82962 GLUCOSE BLOOD TEST: CPT

## 2023-06-01 PROCEDURE — 84132 ASSAY OF SERUM POTASSIUM: CPT

## 2023-06-01 PROCEDURE — C1769: CPT

## 2023-06-01 PROCEDURE — 85610 PROTHROMBIN TIME: CPT

## 2023-06-01 PROCEDURE — 85018 HEMOGLOBIN: CPT

## 2023-06-01 PROCEDURE — 85025 COMPLETE CBC W/AUTO DIFF WBC: CPT

## 2023-06-01 PROCEDURE — 36415 COLL VENOUS BLD VENIPUNCTURE: CPT

## 2023-06-01 PROCEDURE — 87635 SARS-COV-2 COVID-19 AMP PRB: CPT

## 2023-06-01 PROCEDURE — 86901 BLOOD TYPING SEROLOGIC RH(D): CPT

## 2023-06-01 PROCEDURE — 88313 SPECIAL STAINS GROUP 2: CPT

## 2023-06-01 PROCEDURE — 82947 ASSAY GLUCOSE BLOOD QUANT: CPT

## 2023-06-01 PROCEDURE — C2617: CPT

## 2023-06-01 PROCEDURE — 85014 HEMATOCRIT: CPT

## 2023-06-01 PROCEDURE — C9399: CPT

## 2023-06-01 PROCEDURE — 83605 ASSAY OF LACTIC ACID: CPT

## 2023-06-01 PROCEDURE — 82435 ASSAY OF BLOOD CHLORIDE: CPT

## 2023-06-01 PROCEDURE — 84295 ASSAY OF SERUM SODIUM: CPT

## 2023-06-01 PROCEDURE — 86706 HEP B SURFACE ANTIBODY: CPT

## 2023-06-01 PROCEDURE — 93971 EXTREMITY STUDY: CPT

## 2023-06-01 PROCEDURE — 83735 ASSAY OF MAGNESIUM: CPT

## 2023-06-01 PROCEDURE — 76776 US EXAM K TRANSPL W/DOPPLER: CPT

## 2023-06-01 PROCEDURE — 84100 ASSAY OF PHOSPHORUS: CPT

## 2023-06-01 PROCEDURE — C1751: CPT

## 2023-06-01 PROCEDURE — 80048 BASIC METABOLIC PNL TOTAL CA: CPT

## 2023-06-01 PROCEDURE — 86900 BLOOD TYPING SEROLOGIC ABO: CPT

## 2023-06-01 PROCEDURE — 80053 COMPREHEN METABOLIC PANEL: CPT

## 2023-06-01 PROCEDURE — 82565 ASSAY OF CREATININE: CPT

## 2023-06-01 PROCEDURE — 87389 HIV-1 AG W/HIV-1&-2 AB AG IA: CPT

## 2023-06-01 PROCEDURE — 99232 SBSQ HOSP IP/OBS MODERATE 35: CPT | Mod: GC

## 2023-06-01 PROCEDURE — 82330 ASSAY OF CALCIUM: CPT

## 2023-06-01 PROCEDURE — 93005 ELECTROCARDIOGRAM TRACING: CPT

## 2023-06-01 PROCEDURE — 99232 SBSQ HOSP IP/OBS MODERATE 35: CPT

## 2023-06-01 PROCEDURE — 88305 TISSUE EXAM BY PATHOLOGIST: CPT

## 2023-06-01 PROCEDURE — 86803 HEPATITIS C AB TEST: CPT

## 2023-06-01 PROCEDURE — 71045 X-RAY EXAM CHEST 1 VIEW: CPT

## 2023-06-01 PROCEDURE — 87340 HEPATITIS B SURFACE AG IA: CPT

## 2023-06-01 PROCEDURE — 80197 ASSAY OF TACROLIMUS: CPT

## 2023-06-01 PROCEDURE — 87522 HEPATITIS C REVRS TRNSCRPJ: CPT

## 2023-06-01 PROCEDURE — 86850 RBC ANTIBODY SCREEN: CPT

## 2023-06-01 PROCEDURE — 85730 THROMBOPLASTIN TIME PARTIAL: CPT

## 2023-06-01 PROCEDURE — 97161 PT EVAL LOW COMPLEX 20 MIN: CPT

## 2023-06-01 PROCEDURE — 82803 BLOOD GASES ANY COMBINATION: CPT

## 2023-06-01 PROCEDURE — 36569 INSJ PICC 5 YR+ W/O IMAGING: CPT

## 2023-06-01 RX ORDER — TAMSULOSIN HYDROCHLORIDE 0.4 MG/1
1 CAPSULE ORAL
Qty: 0 | Refills: 0 | DISCHARGE
Start: 2023-06-01

## 2023-06-01 RX ORDER — VALGANCICLOVIR 450 MG/1
1 TABLET, FILM COATED ORAL
Qty: 0 | Refills: 0 | DISCHARGE
Start: 2023-06-01

## 2023-06-01 RX ORDER — FAMOTIDINE 10 MG/ML
1 INJECTION INTRAVENOUS
Qty: 0 | Refills: 0 | DISCHARGE
Start: 2023-06-01

## 2023-06-01 RX ORDER — SODIUM BICARBONATE 1 MEQ/ML
2 SYRINGE (ML) INTRAVENOUS
Qty: 0 | Refills: 0 | DISCHARGE
Start: 2023-06-01

## 2023-06-01 RX ORDER — LINAGLIPTIN 5 MG/1
1 TABLET, FILM COATED ORAL
Qty: 30 | Refills: 0
Start: 2023-06-01

## 2023-06-01 RX ORDER — PANTOPRAZOLE SODIUM 20 MG/1
1 TABLET, DELAYED RELEASE ORAL
Qty: 30 | Refills: 0
Start: 2023-06-01 | End: 2023-06-30

## 2023-06-01 RX ORDER — TAMSULOSIN HYDROCHLORIDE 0.4 MG/1
1 CAPSULE ORAL
Qty: 30 | Refills: 0
Start: 2023-06-01

## 2023-06-01 RX ORDER — MYCOPHENOLATE MOFETIL 250 MG/1
4 CAPSULE ORAL
Qty: 0 | Refills: 0 | DISCHARGE
Start: 2023-06-01

## 2023-06-01 RX ORDER — NYSTATIN 500MM UNIT
5 POWDER (EA) MISCELLANEOUS
Qty: 0 | Refills: 0 | DISCHARGE
Start: 2023-06-01

## 2023-06-01 RX ORDER — INSULIN LISPRO 100/ML
5 VIAL (ML) SUBCUTANEOUS
Qty: 0 | Refills: 0 | DISCHARGE
Start: 2023-06-01

## 2023-06-01 RX ORDER — ISOPROPYL ALCOHOL 70 ML/100ML
SWAB TOPICAL
Qty: 30 | Refills: 0 | Status: ACTIVE | COMMUNITY
Start: 2023-05-31 | End: 1900-01-01

## 2023-06-01 RX ORDER — NIFEDIPINE 30 MG
1 TABLET, EXTENDED RELEASE 24 HR ORAL
Qty: 0 | Refills: 0 | DISCHARGE
Start: 2023-06-01

## 2023-06-01 RX ORDER — CALCITRIOL 0.5 UG/1
1 CAPSULE ORAL
Qty: 0 | Refills: 0 | DISCHARGE
Start: 2023-06-01

## 2023-06-01 RX ORDER — INSULIN LISPRO 100/ML
5 VIAL (ML) SUBCUTANEOUS
Refills: 0 | Status: DISCONTINUED | OUTPATIENT
Start: 2023-06-01 | End: 2023-06-01

## 2023-06-01 RX ORDER — SENNA PLUS 8.6 MG/1
2 TABLET ORAL
Qty: 0 | Refills: 0 | DISCHARGE
Start: 2023-06-01

## 2023-06-01 RX ORDER — BLOOD-GLUCOSE METER
W/DEVICE KIT MISCELLANEOUS
Qty: 1 | Refills: 0 | Status: ACTIVE | COMMUNITY
Start: 2023-05-31 | End: 1900-01-01

## 2023-06-01 RX ORDER — ISOPROPYL ALCOHOL, BENZOCAINE .7; .06 ML/ML; ML/ML
0 SWAB TOPICAL
Qty: 100 | Refills: 1
Start: 2023-06-01

## 2023-06-01 RX ORDER — TACROLIMUS 5 MG/1
7 CAPSULE ORAL
Qty: 0 | Refills: 0 | DISCHARGE
Start: 2023-06-01

## 2023-06-01 RX ORDER — BLOOD SUGAR DIAGNOSTIC
STRIP MISCELLANEOUS 4 TIMES DAILY
Qty: 360 | Refills: 3 | Status: ACTIVE | COMMUNITY
Start: 2023-05-31 | End: 1900-01-01

## 2023-06-01 RX ADMIN — Medication 5 MILLIGRAM(S): at 05:12

## 2023-06-01 RX ADMIN — TACROLIMUS 7 MILLIGRAM(S): 5 CAPSULE ORAL at 08:42

## 2023-06-01 RX ADMIN — Medication 1300 MILLIGRAM(S): at 05:12

## 2023-06-01 RX ADMIN — Medication 500000 UNIT(S): at 12:41

## 2023-06-01 RX ADMIN — Medication 1 TABLET(S): at 12:42

## 2023-06-01 RX ADMIN — Medication 8 UNIT(S): at 08:45

## 2023-06-01 RX ADMIN — POLYETHYLENE GLYCOL 3350 17 GRAM(S): 17 POWDER, FOR SOLUTION ORAL at 12:42

## 2023-06-01 RX ADMIN — Medication 60 MILLIGRAM(S): at 05:12

## 2023-06-01 RX ADMIN — CALCITRIOL 0.25 MICROGRAM(S): 0.5 CAPSULE ORAL at 12:41

## 2023-06-01 RX ADMIN — GABAPENTIN 100 MILLIGRAM(S): 400 CAPSULE ORAL at 05:12

## 2023-06-01 RX ADMIN — Medication 8 UNIT(S): at 12:43

## 2023-06-01 RX ADMIN — MYCOPHENOLATE MOFETIL 1 GRAM(S): 250 CAPSULE ORAL at 08:42

## 2023-06-01 RX ADMIN — CHLORHEXIDINE GLUCONATE 1 APPLICATION(S): 213 SOLUTION TOPICAL at 12:44

## 2023-06-01 RX ADMIN — Medication 500000 UNIT(S): at 05:12

## 2023-06-01 RX ADMIN — Medication 500000 UNIT(S): at 00:45

## 2023-06-01 RX ADMIN — FAMOTIDINE 20 MILLIGRAM(S): 10 INJECTION INTRAVENOUS at 12:42

## 2023-06-01 NOTE — PROGRESS NOTE ADULT - PROBLEM SELECTOR PLAN 1
- Test BGs ACTID and at HS while on oral diet, q 6 hours when NPO  - Low Admelog correction scale ACTID and low Admelog correction scale at HS  - Will stop Lantus at this time to prevent hypoglycemia   - Will decrease premeal Admelog 5 units ACTID, HOLD IF NPO  - Hypoglycemia protocol in place   - Consistent carb diet    Discharge plan:   - Recommended Premeal Lispro insulin 5 units before each meal (To hold if BG < 120 or not eating ) + Tradjenta 5 mg daily   - Please Send Rx for rapid insulin Lispro (Humalog or Admelog) pen and rachel insulin pen needle to check insurance coverage  - Please send Rx for Tradjenta 5 mg daily to check insurance coverage  - Please send Rx for Glucometer, stripes, lancets and alcohol pad  - Recommended to check BGs X4 day ( ACTID and at HS), keep the BGs log and bring it for endocrine follow up   - Should follow up with endocrinology in 2 weeks.      Follow up with [facility]   - [address], [address]. [place] [address]. Tel) [phone].      Email sent to office to assist scheduling an appointment. - Test BGs ACTID and at HS while on oral diet, q 6 hours when NPO  - Low Admelog correction scale ACTID and low Admelog correction scale at HS  - Will stop Lantus at this time to prevent hypoglycemia   - Will decrease premeal Admelog 5 units ACTID, HOLD IF NPO  - Hypoglycemia protocol in place   - Consistent carb diet    Discharge plan:   - Recommended Premeal Lispro insulin 5 units before each meal (To hold if BG < 120 or not eating ) + Tradjenta 5 mg daily   - Please Send Rx for rapid insulin Lispro (Humalog or Admelog) pen and rachel insulin pen needle to check insurance coverage  - Please send Rx for Tradjenta 5 mg daily to check insurance coverage  - Please send Rx for Glucometer, stripes, lancets and alcohol pad  - Recommended to check BGs X4 day ( ACTID and at HS), keep the BGs log and bring it for endocrine follow up   - Should follow up with endocrinology in 2 weeks.   - Need Home care  for insulin injection safety assessment because he is new to insulin      Follow up with [facility]   - [address], [address]. [place] [address]. Tel) [phone].      Email sent to office to assist scheduling an appointment.

## 2023-06-01 NOTE — PROGRESS NOTE ADULT - SUBJECTIVE AND OBJECTIVE BOX
Transplant Surgery - Multidisciplinary Rounds  --------------------------------------------------------------  DDRT (1a/1v/1u + stent)    Date: 5/25/23         POD#7    HPI: DARIEN MCGINNIS is a 68 year old male PMH HTN, 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 Patient last received HD Wednesday 5/24. He makes a very small amount of urine. Patient currently reports feeling well w/ no complaints    Present:   Patient seen and examined with multidisciplinary team including Transplant Surgeon: Dr. Polk. Transplant Nephrologist: Dr. Harry Garcia Pharmacist: Pete Thompson and unit RN during am rounds.  Disciplines not in attendance will be notified of the plan.     Interval Events:  - POD#7 DDRT  - Tacrolimus level 7.8 so keeping Envarsus 7 mg daily    Immunosuppression   Induction: Simulect  Maintenance immunosuppression: Envarsus by level, MMF 1 mg BID, prednisone 5 mg daily  Ongoing monitoring for signs of rejection.    Potential Discharge date: today 06/01/2023    Education:  Medications  Plan of care:  See Below    MEDICATIONS  (STANDING):  calcitriol   Capsule 0.25 MICROGram(s) Oral daily  chlorhexidine 2% Cloths 1 Application(s) Topical daily  dextrose 5%. 1000 milliLiter(s) (100 mL/Hr) IV Continuous <Continuous>  dextrose 5%. 1000 milliLiter(s) (50 mL/Hr) IV Continuous <Continuous>  dextrose 50% Injectable 25 Gram(s) IV Push once  dextrose 50% Injectable 12.5 Gram(s) IV Push once  dextrose 50% Injectable 25 Gram(s) IV Push once  famotidine    Tablet 20 milliGRAM(s) Oral daily  gabapentin 100 milliGRAM(s) Oral two times a day  glucagon  Injectable 1 milliGRAM(s) IntraMuscular once  insulin glargine Injectable (LANTUS) 4 Unit(s) SubCutaneous at bedtime  insulin lispro (ADMELOG) corrective regimen sliding scale   SubCutaneous three times a day before meals  insulin lispro (ADMELOG) corrective regimen sliding scale   SubCutaneous at bedtime  insulin lispro Injectable (ADMELOG) 8 Unit(s) SubCutaneous three times a day before meals  mycophenolate mofetil 1 Gram(s) Oral <User Schedule>  NIFEdipine XL 60 milliGRAM(s) Oral daily  nystatin    Suspension 382160 Unit(s) Swish and Swallow four times a day  polyethylene glycol 3350 17 Gram(s) Oral daily  predniSONE   Tablet   Oral   predniSONE   Tablet 5 milliGRAM(s) Oral daily  senna 2 Tablet(s) Oral at bedtime  sodium bicarbonate 1300 milliGRAM(s) Oral two times a day  tacrolimus ER Tablet (ENVARSUS XR) 7 milliGRAM(s) Oral <User Schedule>  tamsulosin 0.4 milliGRAM(s) Oral at bedtime  trimethoprim   80 mG/sulfamethoxazole 400 mG 1 Tablet(s) Oral daily  valGANciclovir 450 milliGRAM(s) Oral <User Schedule>    MEDICATIONS  (PRN):  acetaminophen     Tablet .. 650 milliGRAM(s) Oral every 6 hours PRN Mild Pain (1 - 3)  dextrose Oral Gel 15 Gram(s) Oral once PRN Blood Glucose LESS THAN 70 milliGRAM(s)/deciliter  ondansetron Injectable 4 milliGRAM(s) IV Push every 6 hours PRN Nausea and/or Vomiting  traMADol 25 milliGRAM(s) Oral every 6 hours PRN Moderate Pain (4 - 6)  traMADol 50 milliGRAM(s) Oral every 6 hours PRN Severe Pain (7 - 10)    PAST MEDICAL & SURGICAL HISTORY:  DM (diabetes mellitus)  HTN (hypertension)  Chronic kidney disease, unspecified CKD stage  ESRD on dialysis  Pneumonia  Sepsis  Anemia  No significant past surgical history    Vital Signs Last 24 Hrs  T(C): 36.5 (01 Jun 2023 09:00), Max: 36.9 (31 May 2023 17:15)  T(F): 97.7 (01 Jun 2023 09:00), Max: 98.4 (31 May 2023 17:15)  HR: 96 (01 Jun 2023 09:00) (91 - 100)  BP: 143/85 (01 Jun 2023 09:00) (143/71 - 165/76)  BP(mean): 109 (01 Jun 2023 05:09) (98 - 109)  RR: 17 (01 Jun 2023 09:00) (16 - 20)  SpO2: 100% (01 Jun 2023 09:00) (99% - 100%)    Parameters below as of 01 Jun 2023 09:00  Patient On (Oxygen Delivery Method): room air    I&O's Summary    31 May 2023 07:01  -  01 Jun 2023 07:00  --------------------------------------------------------  IN: 1380 mL / OUT: 1010 mL / NET: 370 mL    01 Jun 2023 07:01  -  01 Jun 2023 11:34  --------------------------------------------------------  IN: 300 mL / OUT: 150 mL / NET: 150 mL               8.7    10.36 )-----------( 370      ( 01 Jun 2023 06:23 )             27.0     134<L>  |  98  |  91<H>  ----------------------------<  86  4.4   |  19<L>  |  3.38<H>    Ca    8.6      01 Jun 2023 06:23  Phos  4.5  Mg     2.9  TPro  6.0  /  Alb  3.5  /  TBili  0.3  /  DBili  x   /  AST  34  /  ALT  31  /  AlkPhos  90    Tacrolimus (), Serum: 7.8 ng/mL (06-01 @ 06:23)    Review of systems  All systems were reviewed and are negative, except as noted in HPI    PHYSICAL EXAM:  Constitutional: Well developed / well nourished  Eyes: Anicteric, PERRLA  ENMT: nc/at  Neck: Supple, trachea midline  Respiratory: CTA B/L  Cardiovascular: RRR  Gastrointestinal: Soft, non distended, mild tenderness at the incision site; RLQ incision C/D/I  Genitourinary: Voiding spontaneously  Extremities: SCD's in place and working bilaterally, AVF   Vascular: Palpable dp pulses bilaterally  Neurological: A&O x3  Skin: no rashes, ulcerations or lesions;  Musculoskeletal: Moving all extremities  Psychiatric: Responsive Transplant Surgery - Multidisciplinary Rounds  --------------------------------------------------------------  DDRT (1a/1v/1u + stent)    Date: 5/25/23         POD#7    HPI: DARIEN MCGINNIS is a 68 year old male PMH HTN, 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 Patient last received HD Wednesday 5/24. He makes a very small amount of urine. Patient currently reports feeling well w/ no complaints    Present:   Patient seen and examined with multidisciplinary team including Transplant Surgeon: Dr. Polk. Transplant Nephrologist: Dr. Harry Garcia Pharmacist: Pete Thompson and unit RN during am rounds.  Disciplines not in attendance will be notified of the plan.     Interval Events:  - POD#7 DDRT  - Started Lantus 4 units at bedtime for glycemic control    Immunosuppression   Induction: Simulect  Maintenance immunosuppression: Envarsus by level, MMF 1 mg BID, prednisone 5 mg daily  Ongoing monitoring for signs of rejection.    Potential Discharge date: today 06/01/2023    Education:  Medications  Plan of care:  See Below    MEDICATIONS  (STANDING):  calcitriol   Capsule 0.25 MICROGram(s) Oral daily  chlorhexidine 2% Cloths 1 Application(s) Topical daily  dextrose 5%. 1000 milliLiter(s) (100 mL/Hr) IV Continuous <Continuous>  dextrose 5%. 1000 milliLiter(s) (50 mL/Hr) IV Continuous <Continuous>  dextrose 50% Injectable 25 Gram(s) IV Push once  dextrose 50% Injectable 12.5 Gram(s) IV Push once  dextrose 50% Injectable 25 Gram(s) IV Push once  famotidine    Tablet 20 milliGRAM(s) Oral daily  gabapentin 100 milliGRAM(s) Oral two times a day  glucagon  Injectable 1 milliGRAM(s) IntraMuscular once  insulin glargine Injectable (LANTUS) 4 Unit(s) SubCutaneous at bedtime  insulin lispro (ADMELOG) corrective regimen sliding scale   SubCutaneous three times a day before meals  insulin lispro (ADMELOG) corrective regimen sliding scale   SubCutaneous at bedtime  insulin lispro Injectable (ADMELOG) 8 Unit(s) SubCutaneous three times a day before meals  mycophenolate mofetil 1 Gram(s) Oral <User Schedule>  NIFEdipine XL 60 milliGRAM(s) Oral daily  nystatin    Suspension 942234 Unit(s) Swish and Swallow four times a day  polyethylene glycol 3350 17 Gram(s) Oral daily  predniSONE   Tablet   Oral   predniSONE   Tablet 5 milliGRAM(s) Oral daily  senna 2 Tablet(s) Oral at bedtime  sodium bicarbonate 1300 milliGRAM(s) Oral two times a day  tacrolimus ER Tablet (ENVARSUS XR) 7 milliGRAM(s) Oral <User Schedule>  tamsulosin 0.4 milliGRAM(s) Oral at bedtime  trimethoprim   80 mG/sulfamethoxazole 400 mG 1 Tablet(s) Oral daily  valGANciclovir 450 milliGRAM(s) Oral <User Schedule>    MEDICATIONS  (PRN):  acetaminophen     Tablet .. 650 milliGRAM(s) Oral every 6 hours PRN Mild Pain (1 - 3)  dextrose Oral Gel 15 Gram(s) Oral once PRN Blood Glucose LESS THAN 70 milliGRAM(s)/deciliter  ondansetron Injectable 4 milliGRAM(s) IV Push every 6 hours PRN Nausea and/or Vomiting  traMADol 25 milliGRAM(s) Oral every 6 hours PRN Moderate Pain (4 - 6)  traMADol 50 milliGRAM(s) Oral every 6 hours PRN Severe Pain (7 - 10)    PAST MEDICAL & SURGICAL HISTORY:  DM (diabetes mellitus)  HTN (hypertension)  Chronic kidney disease, unspecified CKD stage  ESRD on dialysis  Pneumonia  Sepsis  Anemia  No significant past surgical history    Vital Signs Last 24 Hrs  T(C): 36.5 (01 Jun 2023 09:00), Max: 36.9 (31 May 2023 17:15)  T(F): 97.7 (01 Jun 2023 09:00), Max: 98.4 (31 May 2023 17:15)  HR: 96 (01 Jun 2023 09:00) (91 - 100)  BP: 143/85 (01 Jun 2023 09:00) (143/71 - 165/76)  BP(mean): 109 (01 Jun 2023 05:09) (98 - 109)  RR: 17 (01 Jun 2023 09:00) (16 - 20)  SpO2: 100% (01 Jun 2023 09:00) (99% - 100%)    Parameters below as of 01 Jun 2023 09:00  Patient On (Oxygen Delivery Method): room air    I&O's Summary    31 May 2023 07:01  -  01 Jun 2023 07:00  --------------------------------------------------------  IN: 1380 mL / OUT: 1010 mL / NET: 370 mL    01 Jun 2023 07:01  -  01 Jun 2023 11:34  --------------------------------------------------------  IN: 300 mL / OUT: 150 mL / NET: 150 mL               8.7    10.36 )-----------( 370      ( 01 Jun 2023 06:23 )             27.0     134<L>  |  98  |  91<H>  ----------------------------<  86  4.4   |  19<L>  |  3.38<H>    Ca    8.6      01 Jun 2023 06:23  Phos  4.5  Mg     2.9  TPro  6.0  /  Alb  3.5  /  TBili  0.3  /  DBili  x   /  AST  34  /  ALT  31  /  AlkPhos  90    Tacrolimus (), Serum: 7.8 ng/mL (06-01 @ 06:23)    Review of systems  All systems were reviewed and are negative, except as noted in HPI    PHYSICAL EXAM:  Constitutional: Well developed / well nourished  Eyes: Anicteric, PERRLA  ENMT: nc/at  Neck: Supple, trachea midline  Respiratory: CTA B/L  Cardiovascular: RRR  Gastrointestinal: Soft, non distended, mild tenderness at the incision site; RLQ incision C/D/I  Genitourinary: Voiding spontaneously  Extremities: SCD's in place and working bilaterally, AVF   Vascular: Palpable dp pulses bilaterally  Neurological: A&O x3  Skin: no rashes, ulcerations or lesions;  Musculoskeletal: Moving all extremities  Psychiatric: Responsive

## 2023-06-01 NOTE — PROGRESS NOTE ADULT - PROBLEM SELECTOR PLAN 3
Pt. noted to have down-trending serum calcium post-transplant. Serum calcium low at 7.7 today with normal serum albumin levels. Pt. noted to be on calcitriol 0.25 mcg on outpatient meds. Recommend to resume calcitriol. Consider calcium supplementation. Check iPTH, iCa, and vitamin D levels. Monitor iCa. \    If you have any questions, please feel free to contact me  Lala Delarosa  Nephrology Fellow  [phone] / [employer] Teams(Preferred)  (After 5pm or on weekends please page the on-call fellow)
BP Goal < 130/80  currently on Nifedipine XL 60 mg daily    Continue management per primary team
BP Goal < 130/80  currently on Nifedipine XL 60 mg daily    Continue management per primary team

## 2023-06-01 NOTE — PROGRESS NOTE ADULT - ASSESSMENT
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 Patient last received HD Wednesday 5/24. He makes a very small amount of urine. now s/p DDRT (1a/1v/1u + stent) with Simulect induction on 5/25/23.     #S/P DDRT (1a/1v/1u + stent)  - Txp US unable to visualize anastomosis but visualized flow within kidney itself  - CC/Renal diet  - Cr trending down, voiding spontaneously  - PRN pain regimen  - Bowel regimen  - OOBTC, ambulate in hallway    #Immunosuppression  - Simulect induction  - Envarsus by level, MMF 1g BID, prednisone 5 mg daily  - PPx: Valcyte, Bactrim, Nystatin, Pepcid    #HTN  - Continue Nifedipine 60 mg daily  - Increase as indicated for SBP >170    #NIDDM  - ISS, Lantus 4 mg at bedtime, Lispro 8u TID with meals   - Home meds: Prandin 0.5mg BID    #Misc  - RUE duplex 5/30: superficial thrombophlebitis    Will discharge home today DARIEN MCGINNIS is a 68 year old male PMH HTN, 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 Patient last received HD Wednesday 5/24. He makes a very small amount of urine. now s/p DDRT (1a/1v/1u + stent) with Simulect induction on 5/25/23.     #S/P DDRT (1a/1v/1u + stent)  - Txp US unable to visualize anastomosis but visualized flow within kidney itself  - CC/Renal diet  - Cr trending down, voiding spontaneously  - PRN pain regimen  - Bowel regimen  - OOBTC, ambulate in hallway    #Immunosuppression  - Simulect induction  - Envarsus by level, MMF 1g BID, prednisone 5 mg daily  - Tacrolimus level 7.8 so keeping Envarsus 7 mg daily  - PPx: Valcyte, Bactrim, Nystatin, Pepcid    #HTN  - Continue Nifedipine 60 mg daily  - Increase as indicated for SBP >170    #NIDDM  - ISS, Lantus 4 mg at bedtime, Lispro 8u TID with meals   - Home meds: Prandin 0.5mg BID    #Misc  - RUE duplex 5/30: superficial thrombophlebitis    Will discharge home today

## 2023-06-01 NOTE — PROGRESS NOTE ADULT - NSPROGADDITIONALINFOA_GEN_ALL_CORE
Assessed pt/labs/meds and discussed plan of care with primary team/pt  Insulin adjustment   Discharge plan  Follow up care    Gypsy Upton NP-C  Department of Endocrinology, Diabetes and Metabolism   Can be reached via Teams  If no answer or after hours, please contact [phone].  office:  [phone] (M-F 8am-5pm)             [phone] (nights/weekends)
Assessed pt/labs/meds and discussed plan of care with primary team/pt  Insulin adjustment   Discharge plan  Follow up care    Gypsy Upton NP-C  Department of Endocrinology, Diabetes and Metabolism   Can be reached via Teams  If no answer or after hours, please contact [phone].  office:  [phone] (M-F 8am-5pm)             [phone] (nights/weekends)

## 2023-06-01 NOTE — PROGRESS NOTE ADULT - PROBLEM SELECTOR PLAN 4
LDL goal < 70  Consider moderate intensity statin.   Managed by primary team   Can check lipid profile outpatient

## 2023-06-01 NOTE — PROGRESS NOTE ADULT - PROVIDER SPECIALTY LIST ADULT
Nephrology
Nephrology
Transplant Nephrology
Transplant Surgery
Nephrology
Transplant Nephrology
Transplant Surgery
Nephrology
Pharmacy
Transplant Nephrology
Transplant Surgery
Nephrology
Transplant Nephrology
Endocrinology
Endocrinology

## 2023-06-01 NOTE — DISCHARGE NOTE NURSING/CASE MANAGEMENT/SOCIAL WORK - PATIENT PORTAL LINK FT
You can access the FollowMyHealth Patient Portal offered by [facility] by registering at the following website: [email or web address]. By joining [employer]’s FollowMyHealth portal, you will also be able to view your health information using other applications (apps) compatible with our system.

## 2023-06-01 NOTE — PROGRESS NOTE ADULT - NUTRITIONAL ASSESSMENT
Diet, Consistent Carbohydrate w/Evening Snack:   No Concentrated Potassium  No Concentrated Phosphorus  Lacto Veg (Accepts Milk & Milk Products) (05-29-23 @ 10:05) [Active]
Diet, Consistent Carbohydrate w/Evening Snack:   No Concentrated Potassium  No Concentrated Phosphorus  Lacto Veg (Accepts Milk & Milk Products) (05-29-23 @ 10:05) [Active]
MEDICATIONS  (STANDING):  basiliximab  IVPB 20 milliGRAM(s) IV Intermittent once  calcitriol   Capsule 0.25 MICROGram(s) Oral daily  chlorhexidine 2% Cloths 1 Application(s) Topical daily  dextrose 5%. 1000 milliLiter(s) (100 mL/Hr) IV Continuous <Continuous>  dextrose 5%. 1000 milliLiter(s) (50 mL/Hr) IV Continuous <Continuous>  dextrose 50% Injectable 25 Gram(s) IV Push once  dextrose 50% Injectable 12.5 Gram(s) IV Push once  dextrose 50% Injectable 25 Gram(s) IV Push once  famotidine    Tablet 20 milliGRAM(s) Oral daily  gabapentin 100 milliGRAM(s) Oral two times a day  glucagon  Injectable 1 milliGRAM(s) IntraMuscular once  insulin lispro (ADMELOG) corrective regimen sliding scale   SubCutaneous three times a day before meals  insulin lispro (ADMELOG) corrective regimen sliding scale   SubCutaneous at bedtime  methylPREDNISolone sodium succinate Injectable   IV Push   mycophenolate mofetil 1 Gram(s) Oral <User Schedule>  NIFEdipine XL 30 milliGRAM(s) Oral daily  nystatin    Suspension [id number] Unit(s) Swish and Swallow four times a day  polyethylene glycol 3350 17 Gram(s) Oral daily  repaglinide 1 milliGRAM(s) Oral three times a day before meals  senna 2 Tablet(s) Oral at bedtime  trimethoprim   80 mG/sulfamethoxazole 400 mG 1 Tablet(s) Oral daily  valGANciclovir 450 milliGRAM(s) Oral <User Schedule>
MEDICATIONS  (STANDING):  basiliximab  IVPB 20 milliGRAM(s) IV Intermittent once  calcitriol   Capsule 0.25 MICROGram(s) Oral daily  chlorhexidine 2% Cloths 1 Application(s) Topical daily  dextrose 5%. 1000 milliLiter(s) (100 mL/Hr) IV Continuous <Continuous>  dextrose 5%. 1000 milliLiter(s) (50 mL/Hr) IV Continuous <Continuous>  dextrose 50% Injectable 25 Gram(s) IV Push once  dextrose 50% Injectable 12.5 Gram(s) IV Push once  dextrose 50% Injectable 25 Gram(s) IV Push once  famotidine    Tablet 20 milliGRAM(s) Oral daily  gabapentin 100 milliGRAM(s) Oral two times a day  glucagon  Injectable 1 milliGRAM(s) IntraMuscular once  insulin lispro (ADMELOG) corrective regimen sliding scale   SubCutaneous three times a day before meals  insulin lispro (ADMELOG) corrective regimen sliding scale   SubCutaneous at bedtime  methylPREDNISolone sodium succinate Injectable   IV Push   mycophenolate mofetil 1 Gram(s) Oral <User Schedule>  NIFEdipine XL 30 milliGRAM(s) Oral daily  nystatin    Suspension [id number] Unit(s) Swish and Swallow four times a day  polyethylene glycol 3350 17 Gram(s) Oral daily  repaglinide 1 milliGRAM(s) Oral three times a day before meals  senna 2 Tablet(s) Oral at bedtime  trimethoprim   80 mG/sulfamethoxazole 400 mG 1 Tablet(s) Oral daily  valGANciclovir 450 milliGRAM(s) Oral <User Schedule>
MEDICATIONS  (STANDING):  basiliximab  IVPB 20 milliGRAM(s) IV Intermittent once  calcitriol   Capsule 0.25 MICROGram(s) Oral daily  chlorhexidine 2% Cloths 1 Application(s) Topical daily  dextrose 5%. 1000 milliLiter(s) (50 mL/Hr) IV Continuous <Continuous>  dextrose 5%. 1000 milliLiter(s) (100 mL/Hr) IV Continuous <Continuous>  dextrose 50% Injectable 25 Gram(s) IV Push once  dextrose 50% Injectable 25 Gram(s) IV Push once  dextrose 50% Injectable 12.5 Gram(s) IV Push once  famotidine    Tablet 20 milliGRAM(s) Oral daily  gabapentin 100 milliGRAM(s) Oral two times a day  glucagon  Injectable 1 milliGRAM(s) IntraMuscular once  insulin glargine Injectable (LANTUS) 8 Unit(s) SubCutaneous at bedtime  insulin lispro (ADMELOG) corrective regimen sliding scale   SubCutaneous three times a day before meals  insulin lispro (ADMELOG) corrective regimen sliding scale   SubCutaneous at bedtime  insulin lispro Injectable (ADMELOG) 5 Unit(s) SubCutaneous three times a day before meals  mycophenolate mofetil 1 Gram(s) Oral <User Schedule>  NIFEdipine XL 30 milliGRAM(s) Oral daily  nystatin    Suspension [id number] Unit(s) Swish and Swallow four times a day  polyethylene glycol 3350 17 Gram(s) Oral daily  predniSONE   Tablet   Oral   predniSONE   Tablet 20 milliGRAM(s) Oral two times a day  repaglinide 1 milliGRAM(s) Oral three times a day before meals  senna 2 Tablet(s) Oral at bedtime  sodium bicarbonate 1300 milliGRAM(s) Oral two times a day  tamsulosin 0.4 milliGRAM(s) Oral at bedtime  trimethoprim   80 mG/sulfamethoxazole 400 mG 1 Tablet(s) Oral daily  valGANciclovir 450 milliGRAM(s) Oral <User Schedule>
MEDICATIONS  (STANDING):  basiliximab  IVPB 20 milliGRAM(s) IV Intermittent once  calcitriol   Capsule 0.25 MICROGram(s) Oral daily  chlorhexidine 2% Cloths 1 Application(s) Topical daily  dextrose 5%. 1000 milliLiter(s) (100 mL/Hr) IV Continuous <Continuous>  dextrose 5%. 1000 milliLiter(s) (50 mL/Hr) IV Continuous <Continuous>  dextrose 50% Injectable 25 Gram(s) IV Push once  dextrose 50% Injectable 12.5 Gram(s) IV Push once  dextrose 50% Injectable 25 Gram(s) IV Push once  famotidine    Tablet 20 milliGRAM(s) Oral daily  gabapentin 100 milliGRAM(s) Oral two times a day  glucagon  Injectable 1 milliGRAM(s) IntraMuscular once  insulin lispro (ADMELOG) corrective regimen sliding scale   SubCutaneous three times a day before meals  insulin lispro (ADMELOG) corrective regimen sliding scale   SubCutaneous at bedtime  methylPREDNISolone sodium succinate Injectable   IV Push   mycophenolate mofetil 1 Gram(s) Oral <User Schedule>  NIFEdipine XL 30 milliGRAM(s) Oral daily  nystatin    Suspension 355375 Unit(s) Swish and Swallow four times a day  polyethylene glycol 3350 17 Gram(s) Oral daily  repaglinide 1 milliGRAM(s) Oral three times a day before meals  senna 2 Tablet(s) Oral at bedtime  trimethoprim   80 mG/sulfamethoxazole 400 mG 1 Tablet(s) Oral daily  valGANciclovir 450 milliGRAM(s) Oral <User Schedule>
MEDICATIONS  (STANDING):  basiliximab  IVPB 20 milliGRAM(s) IV Intermittent once  calcitriol   Capsule 0.25 MICROGram(s) Oral daily  chlorhexidine 2% Cloths 1 Application(s) Topical daily  dextrose 5%. 1000 milliLiter(s) (50 mL/Hr) IV Continuous <Continuous>  dextrose 5%. 1000 milliLiter(s) (100 mL/Hr) IV Continuous <Continuous>  dextrose 50% Injectable 25 Gram(s) IV Push once  dextrose 50% Injectable 25 Gram(s) IV Push once  dextrose 50% Injectable 12.5 Gram(s) IV Push once  famotidine    Tablet 20 milliGRAM(s) Oral daily  gabapentin 100 milliGRAM(s) Oral two times a day  glucagon  Injectable 1 milliGRAM(s) IntraMuscular once  insulin glargine Injectable (LANTUS) 8 Unit(s) SubCutaneous at bedtime  insulin lispro (ADMELOG) corrective regimen sliding scale   SubCutaneous three times a day before meals  insulin lispro (ADMELOG) corrective regimen sliding scale   SubCutaneous at bedtime  insulin lispro Injectable (ADMELOG) 5 Unit(s) SubCutaneous three times a day before meals  mycophenolate mofetil 1 Gram(s) Oral <User Schedule>  NIFEdipine XL 30 milliGRAM(s) Oral daily  nystatin    Suspension 253903 Unit(s) Swish and Swallow four times a day  polyethylene glycol 3350 17 Gram(s) Oral daily  predniSONE   Tablet   Oral   predniSONE   Tablet 20 milliGRAM(s) Oral two times a day  repaglinide 1 milliGRAM(s) Oral three times a day before meals  senna 2 Tablet(s) Oral at bedtime  sodium bicarbonate 1300 milliGRAM(s) Oral two times a day  tamsulosin 0.4 milliGRAM(s) Oral at bedtime  trimethoprim   80 mG/sulfamethoxazole 400 mG 1 Tablet(s) Oral daily  valGANciclovir 450 milliGRAM(s) Oral <User Schedule>
MEDICATIONS  (STANDING):  basiliximab  IVPB 20 milliGRAM(s) IV Intermittent once  calcitriol   Capsule 0.25 MICROGram(s) Oral daily  chlorhexidine 2% Cloths 1 Application(s) Topical daily  dextrose 5%. 1000 milliLiter(s) (50 mL/Hr) IV Continuous <Continuous>  dextrose 5%. 1000 milliLiter(s) (100 mL/Hr) IV Continuous <Continuous>  dextrose 50% Injectable 25 Gram(s) IV Push once  dextrose 50% Injectable 25 Gram(s) IV Push once  dextrose 50% Injectable 12.5 Gram(s) IV Push once  famotidine    Tablet 20 milliGRAM(s) Oral daily  gabapentin 100 milliGRAM(s) Oral two times a day  glucagon  Injectable 1 milliGRAM(s) IntraMuscular once  insulin glargine Injectable (LANTUS) 8 Unit(s) SubCutaneous at bedtime  insulin lispro (ADMELOG) corrective regimen sliding scale   SubCutaneous three times a day before meals  insulin lispro (ADMELOG) corrective regimen sliding scale   SubCutaneous at bedtime  insulin lispro Injectable (ADMELOG) 5 Unit(s) SubCutaneous three times a day before meals  mycophenolate mofetil 1 Gram(s) Oral <User Schedule>  NIFEdipine XL 30 milliGRAM(s) Oral daily  nystatin    Suspension [id number] Unit(s) Swish and Swallow four times a day  polyethylene glycol 3350 17 Gram(s) Oral daily  predniSONE   Tablet   Oral   predniSONE   Tablet 20 milliGRAM(s) Oral two times a day  repaglinide 1 milliGRAM(s) Oral three times a day before meals  senna 2 Tablet(s) Oral at bedtime  sodium bicarbonate 1300 milliGRAM(s) Oral two times a day  tamsulosin 0.4 milliGRAM(s) Oral at bedtime  trimethoprim   80 mG/sulfamethoxazole 400 mG 1 Tablet(s) Oral daily  valGANciclovir 450 milliGRAM(s) Oral <User Schedule>
MEDICATIONS  (STANDING):  basiliximab  IVPB 20 milliGRAM(s) IV Intermittent once  calcitriol   Capsule 0.25 MICROGram(s) Oral daily  chlorhexidine 2% Cloths 1 Application(s) Topical daily  dextrose 5%. 1000 milliLiter(s) (50 mL/Hr) IV Continuous <Continuous>  dextrose 5%. 1000 milliLiter(s) (100 mL/Hr) IV Continuous <Continuous>  dextrose 50% Injectable 25 Gram(s) IV Push once  dextrose 50% Injectable 25 Gram(s) IV Push once  dextrose 50% Injectable 12.5 Gram(s) IV Push once  famotidine    Tablet 20 milliGRAM(s) Oral daily  gabapentin 100 milliGRAM(s) Oral two times a day  glucagon  Injectable 1 milliGRAM(s) IntraMuscular once  insulin glargine Injectable (LANTUS) 8 Unit(s) SubCutaneous at bedtime  insulin lispro (ADMELOG) corrective regimen sliding scale   SubCutaneous three times a day before meals  insulin lispro (ADMELOG) corrective regimen sliding scale   SubCutaneous at bedtime  insulin lispro Injectable (ADMELOG) 5 Unit(s) SubCutaneous three times a day before meals  mycophenolate mofetil 1 Gram(s) Oral <User Schedule>  NIFEdipine XL 30 milliGRAM(s) Oral daily  nystatin    Suspension 864398 Unit(s) Swish and Swallow four times a day  polyethylene glycol 3350 17 Gram(s) Oral daily  predniSONE   Tablet   Oral   predniSONE   Tablet 20 milliGRAM(s) Oral two times a day  repaglinide 1 milliGRAM(s) Oral three times a day before meals  senna 2 Tablet(s) Oral at bedtime  sodium bicarbonate 1300 milliGRAM(s) Oral two times a day  tamsulosin 0.4 milliGRAM(s) Oral at bedtime  trimethoprim   80 mG/sulfamethoxazole 400 mG 1 Tablet(s) Oral daily  valGANciclovir 450 milliGRAM(s) Oral <User Schedule>

## 2023-06-01 NOTE — PROGRESS NOTE ADULT - ASSESSMENT
68 year old male with  ESRD on HD, DM, HTN, and anemia presenting for kidney transplant.   Donor- KDPI 96%, Terminal Cr 0.8  Last IHD was on 5/24/23    PLAN  1.s/p DDRT on 5/24/23- ATN slowly improving.  Has good UOP, creatinine slowly improving. No urinary retention. Renal US showing some delayed upstroke in iliac and renal artery.    2. IS meds- Simulect Induction .Dosing tac by level. goal level 8-10 . MMF 1gm po bid and steroid taper per protocol. Nystatin, Valcyte and Bactrim.   3. Hyperkalemia- resolved  4. HTN - pressure ok, fluctuates.   5. Hypocalcemia Calcitriol was resumed.   6. Diabetes 2 - appreciate endocrine recs. Insulin regimen adjusted, will need furter titration as outpatient as steroid dose reduces.     If you have any questions, please feel free to contact me  Stephane Hull  Nephrology Fellow  [phone]; Prefer [employer] TEAMS  (After 5pm or on weekends please page the on-call fellow)

## 2023-06-01 NOTE — PROGRESS NOTE ADULT - ATTENDING COMMENTS
I personally saw and examined patient.  Kidney transplant recipient.  Alert, oriented, responsive  Abdomen soft and non-tender    IMMUNOSUPPRESSION DOSE MANAGEMENT  Envarsus level today: 6.1  Envarsus level yesterday: Not done (recent transplant)  Aim for level of: 8-10  Envarsus current dose: 5 mg daily by mouth  Envarsus dose management: Decrease dose to 4 mg by mouth daily
ATN of transplant slowly improving  Creatinine improved and blood sugar improved.   Cont current immunosuppression by level.  Will d/c home today.
68 year old male with  ESRD on HD, DM, HTN, and anemia presenting for kidney transplant.   Donor- [facility]PI 96%, Terminal Cr 0.8  Last IHD was on 5/24/23    PLAN  1.s/p DDRT on 5/24/23- Allograft function with good UOP  2. IS meds- Simulect Induction .Dosing tac by level. goal level 8-10 . MMF 1gm po bid and steroid taper per protocol  3. HTN - monitor BP and start meds accordingly.

## 2023-06-01 NOTE — PROGRESS NOTE ADULT - ASSESSMENT
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 Endocrine following for Uncontrolled T2DM and steroid induced hyperglycemia. BG goal 100- 180 mg/dl.     BG values in 100s and noted BG 86 in BMP with AM finger stick was 150, received Lantus 4 units last night. Discharge planning today as per team     Home regimen: Not taking DM meds for the past year while on dialysis, FS were well controlled. Prior to that, was on Repaglinide 0.5 mg BID

## 2023-06-01 NOTE — PROGRESS NOTE ADULT - PROBLEM SELECTOR PLAN 2
Pt. underwent DDRT under Simulect induction. Current regimen is Envarsus 4 mg qd, MMF 1 gm qd, and steroid taper. Tacrolimus trough is 6.1 today. Recommend to continue with current regimen. Check tacrolimus trough 30 minutes prior to AM dose.
Prednisone 5 mg daily  Discharge recommendation as above
Currently on Prednisone taper  5/30: Prednisone 10 mg BID  5/31: Prednisone 5 mg BID  6/1 onwards: Prednisone 5 mg daily    Will further adjust insulin doses according to BGs and steroid doses

## 2023-06-01 NOTE — PROGRESS NOTE ADULT - SUBJECTIVE AND OBJECTIVE BOX
Diabetes Follow Up Note: Seen at bedside with [facility]   ( [id number] Zach )    Chief complain: Steroid induced hyperglycemia and Type 2 DM    interval Hx: Tolerating diet well. Cr improving  to 3.38. Discharge planning today on Prednisone 5 mg daily as per team. Noted Glucose 86 in BMP this am with . Will stop Lantus to prevent hypoglycemia, however he still requiring premeal insulin.       Review of systems:   CONSTITUTIONAL:  Feels well, good appetite  CARDIOVASCULAR:  Negative for chest pain or palpitations  RESPIRATORY:  Negative for cough, or SOB   GASTROINTESTINAL:  Negative for nausea, vomiting, or abdominal pain  GENITOURINARY:  Negative frequency, urgency or dysuria   Endocrine: No polyuria, polydipsia or S &Sx of hypoglycemia     Allergies  No Known Allergies      Medications  dextrose 5%. 1000 milliLiter(s) IV Continuous <Continuous>  dextrose 5%. 1000 milliLiter(s) IV Continuous <Continuous>  dextrose 50% Injectable 25 Gram(s) IV Push once  dextrose 50% Injectable 12.5 Gram(s) IV Push once  dextrose 50% Injectable 25 Gram(s) IV Push once  dextrose Oral Gel 15 Gram(s) Oral once PRN  glucagon  Injectable 1 milliGRAM(s) IntraMuscular once  insulin lispro (ADMELOG) corrective regimen sliding scale   SubCutaneous three times a day before meals  insulin lispro (ADMELOG) corrective regimen sliding scale   SubCutaneous at bedtime  insulin lispro Injectable (ADMELOG) 5 Unit(s) SubCutaneous three times a day before meals  NIFEdipine XL 60 milliGRAM(s) Oral daily  predniSONE   Tablet   Oral   predniSONE   Tablet 5 milliGRAM(s) Oral daily  tacrolimus ER Tablet (ENVARSUS XR) 7 milliGRAM(s) Oral <User Schedule>  trimethoprim   80 mG/sulfamethoxazole 400 mG 1 Tablet(s) Oral daily  valGANciclovir 450 milliGRAM(s) Oral <User Schedule>      PHYSICAL EXAM:  T(C): 36.5 (06-01-23 @ 09:00), Max: 36.9 (05-31-23 @ 17:15)  HR: 96 (06-01-23 @ 09:00) (91 - 100)  BP: 143/85 (06-01-23 @ 09:00) (143/71 - 165/76)  RR: 17 (06-01-23 @ 09:00) (16 - 20)  SpO2: 100% (06-01-23 @ 09:00) (99% - 100%)    General: NAD, well- developed  abdomen: Soft, Nontender, Nondistended; Bowel sounds present  Extremities: 2+ Peripheral Pulses, No clubbing, cyanosis, or edema, ROM WNL   Neuro: No focal neurologic deficit. A & O X3  SKIN: RLQ incision with staples. no redness or discharge       POCT Blood Glucose.: 136 mg/dL (06-01-23 @ 12:41)  POCT Blood Glucose.: 150 mg/dL (06-01-23 @ 08:35)  POCT Blood Glucose.: 145 mg/dL (05-31-23 @ 21:48)  POCT Blood Glucose.: 159 mg/dL (05-31-23 @ 17:12)  POCT Blood Glucose.: 177 mg/dL (05-31-23 @ 12:59)  POCT Blood Glucose.: 143 mg/dL (05-31-23 @ 09:09)  POCT Blood Glucose.: 150 mg/dL (05-30-23 @ 21:11)  POCT Blood Glucose.: 231 mg/dL (05-30-23 @ 17:13)  POCT Blood Glucose.: 403 mg/dL (05-30-23 @ 12:13)  POCT Blood Glucose.: 362 mg/dL (05-30-23 @ 08:44)  POCT Blood Glucose.: 341 mg/dL (05-30-23 @ 06:59)  POCT Blood Glucose.: 89 mg/dL (05-29-23 @ 21:15)  POCT Blood Glucose.: 76 mg/dL (05-29-23 @ 16:15)    A1C with Estimated Average Glucose Result: 7.7 % (05-26-23 @ 06:38)                          8.7    10.36 )-----------( 370      ( 01 Jun 2023 06:23 )             27.0     06-01    134<L>  |  98  |  91<H>  ----------------------------<  86  4.4   |  19<L>  |  3.38<H>    Ca    8.6      01 Jun 2023 06:23  Phos  4.5     06-01  Mg     2.9     06-01    TPro  6.0  /  Alb  3.5  /  TBili  0.3  /  DBili  x   /  AST  34  /  ALT  31  /  AlkPhos  90  06-01

## 2023-06-01 NOTE — PROGRESS NOTE ADULT - SUBJECTIVE AND OBJECTIVE BOX
Patient is a 68y Male whom presented to the hospital with esrd on hd    PAST MEDICAL & SURGICAL HISTORY:  DM (diabetes mellitus)      HTN (hypertension)      Chronic kidney disease, unspecified CKD stage      ESRD on dialysis      Pneumonia      Sepsis      Anemia      No significant past surgical history          MEDICATIONS  (STANDING):  basiliximab  IVPB 20 milliGRAM(s) IV Intermittent once  calcitriol   Capsule 0.25 MICROGram(s) Oral daily  chlorhexidine 2% Cloths 1 Application(s) Topical daily  dextrose 5%. 1000 milliLiter(s) (100 mL/Hr) IV Continuous <Continuous>  dextrose 5%. 1000 milliLiter(s) (50 mL/Hr) IV Continuous <Continuous>  dextrose 50% Injectable 25 Gram(s) IV Push once  dextrose 50% Injectable 12.5 Gram(s) IV Push once  dextrose 50% Injectable 25 Gram(s) IV Push once  famotidine    Tablet 20 milliGRAM(s) Oral daily  gabapentin 100 milliGRAM(s) Oral two times a day  glucagon  Injectable 1 milliGRAM(s) IntraMuscular once  insulin lispro (ADMELOG) corrective regimen sliding scale   SubCutaneous three times a day before meals  insulin lispro (ADMELOG) corrective regimen sliding scale   SubCutaneous at bedtime  methylPREDNISolone sodium succinate Injectable   IV Push   mycophenolate mofetil 1 Gram(s) Oral <User Schedule>  NIFEdipine XL 30 milliGRAM(s) Oral daily  nystatin    Suspension 584331 Unit(s) Swish and Swallow four times a day  polyethylene glycol 3350 17 Gram(s) Oral daily  repaglinide 1 milliGRAM(s) Oral three times a day before meals  senna 2 Tablet(s) Oral at bedtime  trimethoprim   80 mG/sulfamethoxazole 400 mG 1 Tablet(s) Oral daily  valGANciclovir 450 milliGRAM(s) Oral <User Schedule>      Allergies    No Known Allergies    Intolerances        SOCIAL HISTORY:  Denies ETOh,Smoking,     FAMILY HISTORY:  No pertinent family history in first degree relatives        REVIEW OF SYSTEMS:    CONSTITUTIONAL: No weakness, fevers or chills  EYES/ENT: No visual changes;  no throat pain   NECK: No pain or stiffness  RESPIRATORY: No cough, wheezing, hemoptysis; No shortness of breath  CARDIOVASCULAR: No chest pain or palpitations  GASTROINTESTINAL: No abdominal or epigastric pain. No nausea, vomiting,     No diarrhea or constipation. No melena                                                                                        8.7    10.36 )-----------( 370      ( 01 Jun 2023 06:23 )             27.0       CBC Full  -  ( 01 Jun 2023 06:23 )  WBC Count : 10.36 K/uL  RBC Count : 2.74 M/uL  Hemoglobin : 8.7 g/dL  Hematocrit : 27.0 %  Platelet Count - Automated : 370 K/uL  Mean Cell Volume : 98.5 fl  Mean Cell Hemoglobin : 31.8 pg  Mean Cell Hemoglobin Concentration : 32.2 gm/dL  Auto Neutrophil # : 7.46 K/uL  Auto Lymphocyte # : 1.66 K/uL  Auto Monocyte # : 1.03 K/uL  Auto Eosinophil # : 0.08 K/uL  Auto Basophil # : 0.01 K/uL  Auto Neutrophil % : 72.0 %  Auto Lymphocyte % : 16.0 %  Auto Monocyte % : 9.9 %  Auto Eosinophil % : 0.8 %  Auto Basophil % : 0.1 %      06-01    134<L>  |  98  |  91<H>  ----------------------------<  86  4.4   |  19<L>  |  3.38<H>    Ca    8.6      01 Jun 2023 06:23  Phos  4.5     06-01  Mg     2.9     06-01    TPro  6.0  /  Alb  3.5  /  TBili  0.3  /  DBili  x   /  AST  34  /  ALT  31  /  AlkPhos  90  06-01      CAPILLARY BLOOD GLUCOSE      POCT Blood Glucose.: 136 mg/dL (01 Jun 2023 12:41)  POCT Blood Glucose.: 150 mg/dL (01 Jun 2023 08:35)  POCT Blood Glucose.: 145 mg/dL (31 May 2023 21:48)      Vital Signs Last 24 Hrs  T(C): 36.6 (01 Jun 2023 13:00), Max: 36.6 (01 Jun 2023 01:00)  T(F): 97.9 (01 Jun 2023 13:00), Max: 97.9 (01 Jun 2023 05:09)  HR: 93 (01 Jun 2023 13:00) (91 - 100)  BP: 118/72 (01 Jun 2023 13:00) (118/72 - 165/76)  BP(mean): 109 (01 Jun 2023 05:09) (99 - 109)  RR: 17 (01 Jun 2023 13:00) (16 - 18)  SpO2: 100% (01 Jun 2023 13:00) (99% - 100%)    Parameters below as of 01 Jun 2023 13:00  Patient On (Oxygen Delivery Method): room air                    PHYSICAL EXAM:    Constitutional: NAD  HEENT: conjunctive   clear   Neck:  No JVD  Respiratory: CTAB  Cardiovascular: S1 and S2  Gastrointestinal: BS+, soft, NT/ND  Extremities: No peripheral edema

## 2023-06-01 NOTE — DISCHARGE NOTE NURSING/CASE MANAGEMENT/SOCIAL WORK - NSDCFUADDAPPT_GEN_ALL_CORE_FT
FOLLOW-UP:  1. The transplant clinic will call you to make a follow-up appointment with Dr. Wade within one week - please call if no one contacts you.  2. Please follow up with your primary care physician in one week regarding your hospitalization.

## 2023-06-01 NOTE — PROGRESS NOTE ADULT - PROBLEM SELECTOR PROBLEM 1
Kidney transplant recipient
Uncontrolled type 2 diabetes mellitus with hyperglycemia
Uncontrolled type 2 diabetes mellitus with hyperglycemia

## 2023-06-01 NOTE — PROGRESS NOTE ADULT - SUBJECTIVE AND OBJECTIVE BOX
[facility] DIVISION OF KIDNEY DISEASES AND HYPERTENSION -- FOLLOW UP NOTE  --------------------------------------------------------------------------------  Chief Complaint:    24 hour events/subjective:  Patient was seen and examined at bedside  No acute events overnight. Scr. improved this AM           PAST HISTORY  --------------------------------------------------------------------------------  No significant changes to PMH, PSH, FHx, SHx, unless otherwise noted    ALLERGIES & MEDICATIONS  --------------------------------------------------------------------------------  Allergies    No Known Allergies    Intolerances      Standing Inpatient Medications  calcitriol   Capsule 0.25 MICROGram(s) Oral daily  chlorhexidine 2% Cloths 1 Application(s) Topical daily  dextrose 5%. 1000 milliLiter(s) IV Continuous <Continuous>  dextrose 5%. 1000 milliLiter(s) IV Continuous <Continuous>  dextrose 50% Injectable 12.5 Gram(s) IV Push once  dextrose 50% Injectable 25 Gram(s) IV Push once  dextrose 50% Injectable 25 Gram(s) IV Push once  famotidine    Tablet 20 milliGRAM(s) Oral daily  gabapentin 100 milliGRAM(s) Oral two times a day  glucagon  Injectable 1 milliGRAM(s) IntraMuscular once  insulin glargine Injectable (LANTUS) 4 Unit(s) SubCutaneous at bedtime  insulin lispro (ADMELOG) corrective regimen sliding scale   SubCutaneous three times a day before meals  insulin lispro (ADMELOG) corrective regimen sliding scale   SubCutaneous at bedtime  insulin lispro Injectable (ADMELOG) 8 Unit(s) SubCutaneous three times a day before meals  mycophenolate mofetil 1 Gram(s) Oral <User Schedule>  NIFEdipine XL 60 milliGRAM(s) Oral daily  nystatin    Suspension [id number] Unit(s) Swish and Swallow four times a day  polyethylene glycol 3350 17 Gram(s) Oral daily  predniSONE   Tablet 5 milliGRAM(s) Oral daily  predniSONE   Tablet   Oral   senna 2 Tablet(s) Oral at bedtime  sodium bicarbonate 1300 milliGRAM(s) Oral two times a day  tacrolimus ER Tablet (ENVARSUS XR) 7 milliGRAM(s) Oral <User Schedule>  tamsulosin 0.4 milliGRAM(s) Oral at bedtime  trimethoprim   80 mG/sulfamethoxazole 400 mG 1 Tablet(s) Oral daily  valGANciclovir 450 milliGRAM(s) Oral <User Schedule>    PRN Inpatient Medications  acetaminophen     Tablet .. 650 milliGRAM(s) Oral every 6 hours PRN  dextrose Oral Gel 15 Gram(s) Oral once PRN  ondansetron Injectable 4 milliGRAM(s) IV Push every 6 hours PRN  traMADol 25 milliGRAM(s) Oral every 6 hours PRN  traMADol 50 milliGRAM(s) Oral every 6 hours PRN      REVIEW OF SYSTEMS  --------------------------------------------------------------------------------  As per HPI    VITALS/PHYSICAL EXAM  --------------------------------------------------------------------------------  T(C): 36.5 (06-01-23 @ 09:00), Max: 36.9 (05-31-23 @ 17:15)  HR: 96 (06-01-23 @ 09:00) (91 - 100)  BP: 143/85 (06-01-23 @ 09:00) (143/71 - 165/76)  RR: 17 (06-01-23 @ 09:00) (16 - 20)  SpO2: 100% (06-01-23 @ 09:00) (99% - 100%)  Wt(kg): --        05-31-23 @ 07:01  -  06-01-23 @ 07:00  --------------------------------------------------------  IN: 1380 mL / OUT: 1010 mL / NET: 370 mL    Physical Exam:  	Gen: NAD  	HEENT: PERRL, supple neck, clear oropharynx  	Pulm: CTA B/L  	CV: RRR, S1S2; no rub  	Back: No spinal or CVA tenderness; no sacral edema  	Abd: +BS, soft, nontender/nondistended                      Transplant: No tenderness, swelling, incision c/d/i  	: No suprapubic tenderness  	UE: RUE edema  	LE: Warm, FROM; no edema  	Neuro: No focal deficits  	Psych: Normal affect and mood  	Skin: Warm, without rashes    LABS/STUDIES  --------------------------------------------------------------------------------              8.7    10.36 >-----------<  370      [06-01-23 @ 06:23]              27.0     134  |  98  |  91  ----------------------------<  86      [06-01-23 @ 06:23]  4.4   |  19  |  3.38        Ca     8.6     [06-01-23 @ 06:23]      Mg     2.9     [06-01-23 @ 06:23]      Phos  4.5     [06-01-23 @ 06:23]    TPro  6.0  /  Alb  3.5  /  TBili  0.3  /  DBili  x   /  AST  34  /  ALT  31  /  AlkPhos  90  [06-01-23 @ 06:23]        Creatinine Trend:  SCr 3.38 [06-01 @ 06:23]  SCr 4.05 [05-31 @ 06:49]  SCr 4.02 [05-30 @ 06:27]  SCr 4.28 [05-29 @ 06:18]  SCr 5.08 [05-28 @ 06:49]    Tacrolimus (), Serum: 7.8 ng/mL (06-01 @ 06:23)  Tacrolimus (), Serum: 7.2 ng/mL (05-31 @ 06:51)  Tacrolimus (), Serum: 11.6 ng/mL (05-30 @ 06:27)  Tacrolimus (), Serum: 5.8 ng/mL (05-29 @ 06:18)      HBsAb 40.3      [05-25-23 @ 05:10]  HBsAg Nonreact      [05-25-23 @ 05:10]  HBcAb Nonreact      [05-25-23 @ 05:10]  HCV 0.16, Nonreact      [05-25-23 @ 05:10]  HIV Nonreact      [05-25-23 @ 05:10]

## 2023-06-04 LAB — SURGICAL PATHOLOGY STUDY: SIGNIFICANT CHANGE UP

## 2023-06-05 ENCOUNTER — APPOINTMENT (OUTPATIENT)
Dept: TRANSPLANT | Facility: CLINIC | Age: 68
End: 2023-06-05
Payer: MEDICAID

## 2023-06-05 VITALS
RESPIRATION RATE: 17 BRPM | HEART RATE: 88 BPM | OXYGEN SATURATION: 100 % | DIASTOLIC BLOOD PRESSURE: 76 MMHG | HEIGHT: 62 IN | SYSTOLIC BLOOD PRESSURE: 160 MMHG | BODY MASS INDEX: 25.21 KG/M2 | WEIGHT: 137 LBS

## 2023-06-05 LAB
ALBUMIN SERPL ELPH-MCNC: 4.1 G/DL
ALP BLD-CCNC: 123 U/L
ALT SERPL-CCNC: 44 U/L
ANION GAP SERPL CALC-SCNC: 13 MMOL/L
APPEARANCE: CLEAR
AST SERPL-CCNC: 30 U/L
BACTERIA: NEGATIVE /HPF
BILIRUB SERPL-MCNC: 0.4 MG/DL
BILIRUBIN URINE: NEGATIVE
BLOOD URINE: ABNORMAL
BUN SERPL-MCNC: 53 MG/DL
CALCIUM SERPL-MCNC: 8.8 MG/DL
CALCIUM SERPL-MCNC: 8.8 MG/DL
CAST: 2 /LPF
CHLORIDE SERPL-SCNC: 108 MMOL/L
CO2 SERPL-SCNC: 20 MMOL/L
COLOR: YELLOW
CREAT SERPL-MCNC: 2.26 MG/DL
CREAT SPEC-SCNC: 74 MG/DL
CREAT/PROT UR: 1.2 RATIO
EGFR: 31 ML/MIN/1.73M2
EPITHELIAL CELLS: 1 /HPF
GLUCOSE QUALITATIVE U: 250 MG/DL
GLUCOSE SERPL-MCNC: 187 MG/DL
KETONES URINE: NEGATIVE MG/DL
LEUKOCYTE ESTERASE URINE: ABNORMAL
MAGNESIUM SERPL-MCNC: 2.6 MG/DL
MICROSCOPIC-UA: NORMAL
NITRITE URINE: NEGATIVE
PARATHYROID HORMONE INTACT: 205 PG/ML
PH URINE: 6
PHOSPHATE SERPL-MCNC: 2.9 MG/DL
POTASSIUM SERPL-SCNC: 5.5 MMOL/L
PROT SERPL-MCNC: 6.4 G/DL
PROT UR-MCNC: 91 MG/DL
PROTEIN URINE: 100 MG/DL
RED BLOOD CELLS URINE: 243 /HPF
SODIUM SERPL-SCNC: 141 MMOL/L
SPECIFIC GRAVITY URINE: 1.02
TACROLIMUS SERPL-MCNC: 6.4 NG/ML
URATE SERPL-MCNC: 8.2 MG/DL
UROBILINOGEN URINE: 0.2 MG/DL
WHITE BLOOD CELLS URINE: 14 /HPF

## 2023-06-05 PROCEDURE — 99215 OFFICE O/P EST HI 40 MIN: CPT | Mod: 24

## 2023-06-05 RX ORDER — ASPIRIN ENTERIC COATED TABLETS 81 MG 81 MG/1
81 TABLET, DELAYED RELEASE ORAL DAILY
Refills: 0 | Status: DISCONTINUED | COMMUNITY
Start: 2022-10-13 | End: 2023-06-05

## 2023-06-05 RX ORDER — INSULIN GLARGINE 100 [IU]/ML
100 INJECTION, SOLUTION SUBCUTANEOUS
Qty: 30 | Refills: 0 | Status: DISCONTINUED | COMMUNITY
Start: 2023-05-31 | End: 2023-06-05

## 2023-06-05 RX ORDER — LIDOCAINE AND PRILOCAINE 25; 25 MG/G; MG/G
2.5-2.5 CREAM TOPICAL
Refills: 0 | Status: DISCONTINUED | COMMUNITY
Start: 2022-09-22 | End: 2023-06-05

## 2023-06-05 RX ORDER — REPAGLINIDE 1 MG/1
1 TABLET ORAL 3 TIMES DAILY
Qty: 90 | Refills: 11 | Status: DISCONTINUED | COMMUNITY
Start: 2023-05-26 | End: 2023-06-05

## 2023-06-05 RX ORDER — GABAPENTIN 100 MG/1
100 CAPSULE ORAL
Refills: 0 | Status: DISCONTINUED | COMMUNITY
Start: 2022-09-06 | End: 2023-06-05

## 2023-06-05 RX ORDER — SEVELAMER CARBONATE 800 MG/1
800 TABLET, FILM COATED ORAL
Refills: 0 | Status: DISCONTINUED | COMMUNITY
Start: 2022-10-02 | End: 2023-06-05

## 2023-06-05 RX ORDER — DICLOFENAC SODIUM 1% 10 MG/G
1 GEL TOPICAL
Refills: 0 | Status: DISCONTINUED | COMMUNITY
Start: 2022-09-06 | End: 2023-06-05

## 2023-06-05 RX ORDER — DOCUSATE SODIUM 100 MG/1
100 CAPSULE, LIQUID FILLED ORAL
Refills: 0 | Status: DISCONTINUED | COMMUNITY
Start: 2022-09-06 | End: 2023-06-05

## 2023-06-05 RX ORDER — FAMOTIDINE 20 MG/1
20 TABLET, FILM COATED ORAL
Refills: 0 | Status: DISCONTINUED | COMMUNITY
Start: 2022-10-13 | End: 2023-06-05

## 2023-06-05 RX ORDER — POLYETHYLENE GLYCOL 3350 17 G/17G
17 POWDER, FOR SOLUTION ORAL
Refills: 0 | Status: DISCONTINUED | COMMUNITY
Start: 2022-09-13 | End: 2023-06-05

## 2023-06-05 RX ORDER — FLUOXETINE HYDROCHLORIDE 10 MG/1
10 CAPSULE ORAL
Refills: 0 | Status: DISCONTINUED | COMMUNITY
Start: 2022-10-05 | End: 2023-06-05

## 2023-06-05 RX ORDER — REPAGLINIDE 0.5 MG/1
0.5 TABLET ORAL
Refills: 0 | Status: DISCONTINUED | COMMUNITY
Start: 2022-09-16 | End: 2023-06-05

## 2023-06-05 RX ORDER — MIDODRINE HYDROCHLORIDE 2.5 MG/1
2.5 TABLET ORAL
Refills: 0 | Status: DISCONTINUED | COMMUNITY
Start: 2022-09-14 | End: 2023-06-05

## 2023-06-05 RX ORDER — TACROLIMUS 1 MG/1
1 TABLET, EXTENDED RELEASE ORAL
Qty: 270 | Refills: 3 | Status: DISCONTINUED | COMMUNITY
Start: 2023-05-26 | End: 2023-06-05

## 2023-06-05 RX ORDER — SODIUM POLYSTYRENE SULFONATE 4.1 MEQ/G
POWDER, FOR SUSPENSION ORAL; RECTAL
Refills: 0 | Status: DISCONTINUED | COMMUNITY
Start: 2022-08-08 | End: 2023-06-05

## 2023-06-05 RX ORDER — ATORVASTATIN CALCIUM 10 MG/1
10 TABLET, FILM COATED ORAL
Qty: 30 | Refills: 11 | Status: ACTIVE | COMMUNITY
Start: 2022-08-17

## 2023-06-05 RX ORDER — POLYETHYLENE GLYCOL 3350 AND ELECTROLYTES WITH LEMON FLAVOR 236; 22.74; 6.74; 5.86; 2.97 G/4L; G/4L; G/4L; G/4L; G/4L
236 POWDER, FOR SOLUTION ORAL
Qty: 1 | Refills: 0 | Status: DISCONTINUED | COMMUNITY
Start: 2022-10-28 | End: 2023-06-05

## 2023-06-05 NOTE — PHYSICAL EXAM
[No Acute Distress] : no acute distress [Sclera Anicteric] : sclera anicteric [] : right dorsalis pedis palpable [Normal] : normal [Site: ___] : Site: [unfilled] [Clean] : clean [Dry] : dry [Healing Well] : healing well [FreeTextEntry1] : No oral thrush [TextBox_25] : Soft, appropriately tender at incision RLQ, nondistended [TextBox_34] : No edema b/l LE [TextBox_86] : No inguinal lymphadenopathy  [Bleeding] : no active bleeding [Foul Odor] : no foul smell [Purulent Drainage] : no purulent drainage [Serosanguinous Drainage] : no serosanguinous drainage [Erythema] : not erythematous [Warm] : not warm [Tender] : not tender

## 2023-06-05 NOTE — PLAN
[FreeTextEntry1] : 68M s/p DDRT 5/25/23 with improving renal function\par Immunosuppression - Cont Envarsus 7mg daily, Prednisone 5mg daily, Cellcept 1gm bid\par Will check tacrolimus level and adjust dose for goal ~8-10\par Prophylaxis with nystatin, valcyte and bactrim\par HTN - cont nifedipine 60mg daily. may need second agent if remains elevated\par DM - f/u with pharmacy re: lantus. Patient should be taking lantus 5u qhs. If not able to get, will increase pre-meal humalog to 8u. Cont tradjenta 5mg. \par Will need stent removal in few weeks.\par \par Repeat labs next week, f/u with nephrology next week

## 2023-06-05 NOTE — REASON FOR VISIT
[Follow-Up] : a follow-up visit  [Family Member] : family member [FreeTextEntry3] : DDRT [FreeTextEntry5] : 5/25/2023

## 2023-06-05 NOTE — HISTORY OF PRESENT ILLNESS
[ Donor] :  donor [Basiliximab] : basiliximab [Steroids] : steroids [Negative/Positive] : Donor Negative/Recipient Positive [] : Yes [Terminal Creatinine: ____] : Terminal Creatinine: [unfilled] [KDPI: ____] : Kidney Donor Profile Index: [unfilled] [Cold Ischemic Time: ____] : Cold Ischemic Time: [unfilled] [PHS Increased Risk] : no Public Health Service increased risk [TextBox_52] : 72yo donor [de-identified] : 68M with ESRD due to DM, s/p DDRT 5/25/23.\par Patient did well post-op and was discharged on POD#7 with improving renal function, not requiring dialysis.\par He currently complains of abdominal bloating.\par Denies fevers, pain, nausea, diarrhea, hematuria or dysuria.\par He has constipation which improves with senna.\par \par Glucose has been elevated, ranging 100-300. Pt's daughter states he never received lantus, and is only taking Humalog 5u pre-meal and tradjenta.\par \par Currently taking Envarsus 7mg daily\par Last Cr 6/1 was 3.38

## 2023-06-08 LAB — BKV DNA SPEC QL NAA+PROBE: NOT DETECTED IU/ML

## 2023-06-12 ENCOUNTER — APPOINTMENT (OUTPATIENT)
Dept: NEPHROLOGY | Facility: CLINIC | Age: 68
End: 2023-06-12
Payer: MEDICAID

## 2023-06-12 ENCOUNTER — APPOINTMENT (OUTPATIENT)
Dept: TRANSPLANT | Facility: CLINIC | Age: 68
End: 2023-06-12

## 2023-06-12 VITALS
BODY MASS INDEX: 24.29 KG/M2 | TEMPERATURE: 98 F | SYSTOLIC BLOOD PRESSURE: 153 MMHG | HEART RATE: 83 BPM | WEIGHT: 132 LBS | HEIGHT: 62 IN | RESPIRATION RATE: 17 BRPM | DIASTOLIC BLOOD PRESSURE: 65 MMHG | OXYGEN SATURATION: 100 %

## 2023-06-12 DIAGNOSIS — Z01.818 ENCOUNTER FOR OTHER PREPROCEDURAL EXAMINATION: ICD-10-CM

## 2023-06-12 DIAGNOSIS — D52.9 FOLATE DEFICIENCY ANEMIA, UNSPECIFIED: ICD-10-CM

## 2023-06-12 LAB
ALBUMIN SERPL ELPH-MCNC: 4.1 G/DL
ALP BLD-CCNC: 142 U/L
ALT SERPL-CCNC: 26 U/L
ANION GAP SERPL CALC-SCNC: 11 MMOL/L
APPEARANCE: ABNORMAL
AST SERPL-CCNC: 16 U/L
BACTERIA: NEGATIVE /HPF
BILIRUB SERPL-MCNC: 0.4 MG/DL
BILIRUBIN URINE: NEGATIVE
BKV DNA SPEC QL NAA+PROBE: NOT DETECTED IU/ML
BLOOD URINE: ABNORMAL
BUN SERPL-MCNC: 32 MG/DL
CALCIUM SERPL-MCNC: 9 MG/DL
CALCIUM SERPL-MCNC: 9 MG/DL
CAST: 5 /LPF
CHLORIDE SERPL-SCNC: 114 MMOL/L
CO2 SERPL-SCNC: 16 MMOL/L
COLOR: YELLOW
CREAT SERPL-MCNC: 1.63 MG/DL
CREAT SPEC-SCNC: 78 MG/DL
CREAT/PROT UR: 1 RATIO
EGFR: 46 ML/MIN/1.73M2
EPITHELIAL CELLS: 2 /HPF
FERRITIN SERPL-MCNC: 285 NG/ML
FOLATE SERPL-MCNC: 3.7 NG/ML
GLUCOSE QUALITATIVE U: 250 MG/DL
GLUCOSE SERPL-MCNC: 196 MG/DL
IRON SATN MFR SERPL: 20 %
IRON SERPL-MCNC: 47 UG/DL
KETONES URINE: NEGATIVE MG/DL
LEUKOCYTE ESTERASE URINE: NEGATIVE
MAGNESIUM SERPL-MCNC: 2 MG/DL
MICROSCOPIC-UA: NORMAL
NITRITE URINE: NEGATIVE
PARATHYROID HORMONE INTACT: 120 PG/ML
PH URINE: 6
PHOSPHATE SERPL-MCNC: 2.7 MG/DL
POTASSIUM SERPL-SCNC: 5.1 MMOL/L
PROT SERPL-MCNC: 6.6 G/DL
PROT UR-MCNC: 79 MG/DL
PROTEIN URINE: 100 MG/DL
RED BLOOD CELLS URINE: 103 /HPF
SODIUM SERPL-SCNC: 141 MMOL/L
SPECIFIC GRAVITY URINE: 1.02
TACROLIMUS SERPL-MCNC: 6.7 NG/ML
TIBC SERPL-MCNC: 237 UG/DL
UIBC SERPL-MCNC: 191 UG/DL
URATE SERPL-MCNC: 6.7 MG/DL
UROBILINOGEN URINE: 0.2 MG/DL
VIT B12 SERPL-MCNC: 447 PG/ML
WHITE BLOOD CELLS URINE: 8 /HPF

## 2023-06-12 PROCEDURE — 99214 OFFICE O/P EST MOD 30 MIN: CPT

## 2023-06-12 RX ORDER — GABAPENTIN 100 MG/1
100 CAPSULE ORAL
Qty: 90 | Refills: 1 | Status: ACTIVE | COMMUNITY
Start: 2023-06-12

## 2023-06-12 NOTE — HISTORY OF PRESENT ILLNESS
[ Donor] :  donor [Basiliximab] : basiliximab [Steroids] : steroids [Negative/Positive] : Donor Negative/Recipient Positive [] : Yes [Terminal Creatinine: ____] : Terminal Creatinine: [unfilled] [KDPI: ____] : Kidney Donor Profile Index: [unfilled] [TextBox_7] : 5/25/23 [FreeTextEntry4] : 68 year old male with  ESRD on HD, DM, HTN, and anemia s/p kidney transplantation. \par Donor- KDPI 96%, Terminal Cr 0.8\par Last IHD was on 5/24/23 [de-identified] : Pt here for first post transplant f/u visit.  Had labs 2 days ago, creatinine down to 1.6.  He feels weak, sad, and has no apatite.  He was started on lantus by his endocrinologist and prozac by pcp.  Folate was low.  Has FELTON as well and some tremor.

## 2023-06-12 NOTE — PLAN
[FreeTextEntry1] : 1.s/p DDRT on 5/24/23- Had slow graft function from ATN which has largely resolved. Last creatinine down to 1.6. \par 2. IS meds- Simulect Induction .Dosing tac by level. goal level 8-10 . MMF 1gm po bid and prednisone 5.  Lower MMF to 500mgs BID in setting of poor apatite and GI issues. Nystatin, Valcyte and Bactrim. \par 3. HTN - pressure ok, fluctuates. \par 4. Anemia - has folate deficiency, start folic acid 1mg daily. \par 5. Diabetes 2 - Blood glucose high in the evening.  Saw endocrine today and blood sugars adjusted.  Ok to take gabapentin\par 6.  Depression - back on prozac. \par f/u in 1 week.

## 2023-06-12 NOTE — PHYSICAL EXAM
[General Appearance - Alert] : alert [General Appearance - In No Acute Distress] : in no acute distress [General Appearance - Well Nourished] : well nourished [General Appearance - Well Developed] : well developed [Sclera] : the sclera and conjunctiva were normal [Extraocular Movements] : extraocular movements were intact [Strabismus] : no strabismus was seen [Outer Ear] : the ears and nose were normal in appearance [Hearing Threshold Finger Rub Not [place]] : hearing was normal [Nasal Cavity] : the nasal mucosa and septum were normal [Neck Appearance] : the appearance of the neck was normal [Neck Cervical Mass (___cm)] : no neck mass was observed [Respiration, Rhythm And Depth] : normal respiratory rhythm and effort [Exaggerated Use Of Accessory Muscles For Inspiration] : no accessory muscle use [Auscultation Breath Sounds / Voice Sounds] : lungs were clear to auscultation bilaterally [Heart Rate And Rhythm] : heart rate was normal and rhythm regular [Heart Sounds] : normal S1 and S2 [Heart Sounds Gallop] : no gallops [Edema] : there was no peripheral edema [Bowel Sounds] : normal bowel sounds [Abdomen Soft] : soft [Abdomen Tenderness] : non-tender [FreeTextEntry1] : incision c/d/i with staples in place.  [Cervical Lymph Nodes Enlarged Posterior Bilaterally] : posterior cervical [Cervical Lymph Nodes Enlarged Anterior Bilaterally] : anterior cervical [Supraclavicular Lymph Nodes Enlarged Bilaterally] : supraclavicular [Abnormal Walk] : normal gait [Nail Clubbing] : no clubbing  or cyanosis of the fingernails [Musculoskeletal - Swelling] : no joint swelling seen [Skin Color & Pigmentation] : normal skin color and pigmentation [Skin Turgor] : normal skin turgor [] : no rash [Cranial Nerves] : cranial nerves 2-12 were intact [No Focal Deficits] : no focal deficits [Oriented To Time, Place, And Person] : oriented to person, place, and time [Affect] : the affect was normal [Impaired Insight] : insight and judgment were intact

## 2023-06-19 ENCOUNTER — APPOINTMENT (OUTPATIENT)
Dept: TRANSPLANT | Facility: CLINIC | Age: 68
End: 2023-06-19
Payer: MEDICAID

## 2023-06-19 VITALS
OXYGEN SATURATION: 100 % | HEIGHT: 62 IN | HEART RATE: 87 BPM | RESPIRATION RATE: 17 BRPM | SYSTOLIC BLOOD PRESSURE: 126 MMHG | WEIGHT: 131 LBS | DIASTOLIC BLOOD PRESSURE: 67 MMHG | BODY MASS INDEX: 24.11 KG/M2

## 2023-06-19 LAB
ALBUMIN SERPL ELPH-MCNC: 4.3 G/DL
ALP BLD-CCNC: 177 U/L
ALT SERPL-CCNC: 17 U/L
ANION GAP SERPL CALC-SCNC: 13 MMOL/L
APPEARANCE: CLEAR
AST SERPL-CCNC: 15 U/L
BACTERIA: NEGATIVE /HPF
BILIRUB SERPL-MCNC: 0.4 MG/DL
BILIRUBIN URINE: NEGATIVE
BKV DNA SPEC QL NAA+PROBE: NOT DETECTED IU/ML
BLOOD URINE: ABNORMAL
BUN SERPL-MCNC: 28 MG/DL
CALCIUM SERPL-MCNC: 8.9 MG/DL
CALCIUM SERPL-MCNC: 8.9 MG/DL
CAST: 3 /LPF
CHLORIDE SERPL-SCNC: 110 MMOL/L
CO2 SERPL-SCNC: 16 MMOL/L
COLOR: YELLOW
CREAT SERPL-MCNC: 1.62 MG/DL
CREAT SPEC-SCNC: 81 MG/DL
CREAT/PROT UR: 0.4 RATIO
EGFR: 46 ML/MIN/1.73M2
EPITHELIAL CELLS: 1 /HPF
GLUCOSE QUALITATIVE U: NEGATIVE MG/DL
GLUCOSE SERPL-MCNC: 111 MG/DL
KETONES URINE: NEGATIVE MG/DL
LEUKOCYTE ESTERASE URINE: NEGATIVE
MAGNESIUM SERPL-MCNC: 1.8 MG/DL
MICROSCOPIC-UA: NORMAL
NITRITE URINE: NEGATIVE
PARATHYROID HORMONE INTACT: 136 PG/ML
PH URINE: 5.5
PHOSPHATE SERPL-MCNC: 2.7 MG/DL
POTASSIUM SERPL-SCNC: 4.7 MMOL/L
PROT SERPL-MCNC: 6.5 G/DL
PROT UR-MCNC: 31 MG/DL
PROTEIN URINE: 30 MG/DL
RED BLOOD CELLS URINE: 4 /HPF
SODIUM SERPL-SCNC: 139 MMOL/L
SPECIFIC GRAVITY URINE: 1.01
TACROLIMUS SERPL-MCNC: 5.9 NG/ML
URATE SERPL-MCNC: 6 MG/DL
UROBILINOGEN URINE: 0.2 MG/DL
WHITE BLOOD CELLS URINE: 4 /HPF

## 2023-06-19 PROCEDURE — 99215 OFFICE O/P EST HI 40 MIN: CPT | Mod: 24

## 2023-06-19 NOTE — PLAN
[FreeTextEntry1] : Immunosuppression - Increase to Envarsus 9mg daily, Cont Prednisone 5mg daily, Cellcept 500mg bid\par Will check tacrolimus level and adjust dose for goal ~8-10\par Prophylaxis with nystatin, valcyte and bactrim\par HTN controlled with current regimen\par DM controlled with Tradjenta and sliding scale. Did not get insurance approval for lantus yet. Endocrinology managing. Glucose 100-270\par Depression - Prozac dose increased by PMD\par Will need cystoscopy and stent removal in few weeks\par \par Patient to follow-up with nephrology next week, then continue with weekly appointments.

## 2023-06-19 NOTE — HISTORY OF PRESENT ILLNESS
[ Donor] :  donor [Basiliximab] : basiliximab [Steroids] : steroids [Negative/Positive] : Donor Negative/Recipient Positive [] : Yes [Terminal Creatinine: ____] : Terminal Creatinine: [unfilled] [KDPI: ____] : Kidney Donor Profile Index: [unfilled] [de-identified] : 68M with DM, HTN, ESRD s/p DDRT 5/25/23\par Patient has had slow improvement in kidney function and did not require HD post-op.\par Cr has slowly decreased to 1.6. Currently taking Envarsus 8\par \par Main complaint is decreased appetite.\par He denies any fevers, abdominal pain, nausea, diarrhea, hematuria or dysuria.

## 2023-06-19 NOTE — PHYSICAL EXAM
[No Acute Distress] : no acute distress [Sclera Anicteric] : sclera anicteric [Breathing Comfortably on RA] : breathing comfortably on room air [Normal Rate] : normal rate [Soft] : soft [Non-tender] : non-tender [In Left Forearm] : fistula/graft in left forearm [] : right dorsalis pedis palpable [Normal] : normal [Site: ___] : Site: [unfilled] [Clean] : clean [Dry] : dry [Healing Well] : healing well [FreeTextEntry1] : No oral thrush [TextBox_25] : staples removed and steristrips applied [TextBox_34] : No edema b/l LE [TextBox_86] : No inguinal lymphadenopathy  [Bleeding] : no active bleeding [Foul Odor] : no foul smell [Purulent Drainage] : no purulent drainage [Serosanguinous Drainage] : no serosanguinous drainage [Erythema] : not erythematous [Warm] : not warm [Tender] : not tender

## 2023-06-21 ENCOUNTER — APPOINTMENT (OUTPATIENT)
Dept: TRANSPLANT | Facility: CLINIC | Age: 68
End: 2023-06-21

## 2023-06-26 ENCOUNTER — APPOINTMENT (OUTPATIENT)
Dept: NEPHROLOGY | Facility: CLINIC | Age: 68
End: 2023-06-26
Payer: MEDICAID

## 2023-06-26 ENCOUNTER — APPOINTMENT (OUTPATIENT)
Dept: RADIOLOGY | Facility: CLINIC | Age: 68
End: 2023-06-26
Payer: MEDICAID

## 2023-06-26 ENCOUNTER — NON-APPOINTMENT (OUTPATIENT)
Age: 68
End: 2023-06-26

## 2023-06-26 VITALS
OXYGEN SATURATION: 100 % | RESPIRATION RATE: 17 BRPM | WEIGHT: 131 LBS | BODY MASS INDEX: 24.11 KG/M2 | SYSTOLIC BLOOD PRESSURE: 157 MMHG | HEIGHT: 62 IN | TEMPERATURE: 98 F | DIASTOLIC BLOOD PRESSURE: 69 MMHG | HEART RATE: 80 BPM

## 2023-06-26 LAB
ALBUMIN SERPL ELPH-MCNC: 4.2 G/DL
ALP BLD-CCNC: 161 U/L
ALT SERPL-CCNC: 12 U/L
ANION GAP SERPL CALC-SCNC: 13 MMOL/L
APPEARANCE: CLEAR
AST SERPL-CCNC: 12 U/L
BACTERIA: NEGATIVE /HPF
BILIRUB SERPL-MCNC: 0.4 MG/DL
BILIRUBIN URINE: NEGATIVE
BKV DNA SPEC QL NAA+PROBE: NOT DETECTED IU/ML
BLOOD URINE: NEGATIVE
BUN SERPL-MCNC: 24 MG/DL
CALCIUM SERPL-MCNC: 8.9 MG/DL
CALCIUM SERPL-MCNC: 8.9 MG/DL
CAST: 3 /LPF
CHLORIDE SERPL-SCNC: 107 MMOL/L
CO2 SERPL-SCNC: 19 MMOL/L
COLOR: YELLOW
CREAT SERPL-MCNC: 1.52 MG/DL
CREAT SPEC-SCNC: 77 MG/DL
CREAT/PROT UR: 0.5 RATIO
EGFR: 50 ML/MIN/1.73M2
EPITHELIAL CELLS: 1 /HPF
GLUCOSE QUALITATIVE U: NEGATIVE MG/DL
GLUCOSE SERPL-MCNC: 100 MG/DL
HBV SURFACE AG SER QL: NONREACTIVE
HCV AB SER QL: NONREACTIVE
HCV S/CO RATIO: 0.08 S/CO
HIV1 RNA # SERPL NAA+PROBE: NORMAL
HIV1 RNA # SERPL NAA+PROBE: NORMAL COPIES/ML
HIV1+2 AB SPEC QL IA.RAPID: NONREACTIVE
KETONES URINE: NEGATIVE MG/DL
LEUKOCYTE ESTERASE URINE: NEGATIVE
MAGNESIUM SERPL-MCNC: 1.7 MG/DL
MICROSCOPIC-UA: NORMAL
NITRITE URINE: NEGATIVE
PARATHYROID HORMONE INTACT: 121 PG/ML
PH URINE: 5.5
PHOSPHATE SERPL-MCNC: 3.2 MG/DL
POTASSIUM SERPL-SCNC: 4.8 MMOL/L
PROT SERPL-MCNC: 6.5 G/DL
PROT UR-MCNC: 35 MG/DL
PROTEIN URINE: 30 MG/DL
RED BLOOD CELLS URINE: 1 /HPF
SODIUM SERPL-SCNC: 140 MMOL/L
SPECIFIC GRAVITY URINE: 1.01
TACROLIMUS SERPL-MCNC: 7.2 NG/ML
URATE SERPL-MCNC: 5.8 MG/DL
UROBILINOGEN URINE: 0.2 MG/DL
VIRAL LOAD INTERP: NORMAL
VIRAL LOAD LOG: NORMAL LG COP/ML
WHITE BLOOD CELLS URINE: 1 /HPF

## 2023-06-26 PROCEDURE — 99214 OFFICE O/P EST MOD 30 MIN: CPT

## 2023-06-26 PROCEDURE — 71046 X-RAY EXAM CHEST 2 VIEWS: CPT

## 2023-06-26 RX ORDER — MYCOPHENOLATE MOFETIL 500 MG/1
500 TABLET ORAL
Qty: 180 | Refills: 1 | Status: ACTIVE | COMMUNITY
Start: 2023-05-26

## 2023-06-26 NOTE — PHYSICAL EXAM
[General Appearance - Alert] : alert [General Appearance - In No Acute Distress] : in no acute distress [General Appearance - Well Nourished] : well nourished [General Appearance - Well Developed] : well developed [Sclera] : the sclera and conjunctiva were normal [Extraocular Movements] : extraocular movements were intact [Strabismus] : no strabismus was seen [Outer Ear] : the ears and nose were normal in appearance [Hearing Threshold Finger Rub Not [place]] : hearing was normal [Nasal Cavity] : the nasal mucosa and septum were normal [Neck Appearance] : the appearance of the neck was normal [Neck Cervical Mass (___cm)] : no neck mass was observed [Respiration, Rhythm And Depth] : normal respiratory rhythm and effort [Exaggerated Use Of Accessory Muscles For Inspiration] : no accessory muscle use [Heart Rate And Rhythm] : heart rate was normal and rhythm regular [Heart Sounds] : normal S1 and S2 [Heart Sounds Gallop] : no gallops [Edema] : there was no peripheral edema [Bowel Sounds] : normal bowel sounds [Abdomen Soft] : soft [Abdomen Tenderness] : non-tender [Cervical Lymph Nodes Enlarged Posterior Bilaterally] : posterior cervical [Cervical Lymph Nodes Enlarged Anterior Bilaterally] : anterior cervical [Supraclavicular Lymph Nodes Enlarged Bilaterally] : supraclavicular [Abnormal Walk] : normal gait [Nail Clubbing] : no clubbing  or cyanosis of the fingernails [Musculoskeletal - Swelling] : no joint swelling seen [Skin Color & Pigmentation] : normal skin color and pigmentation [Skin Turgor] : normal skin turgor [] : no rash [Cranial Nerves] : cranial nerves 2-12 were intact [No Focal Deficits] : no focal deficits [Oriented To Time, Place, And Person] : oriented to person, place, and time [Impaired Insight] : insight and judgment were intact [Affect] : the affect was normal [FreeTextEntry1] : incision c/d/i with staples in place.

## 2023-06-26 NOTE — HISTORY OF PRESENT ILLNESS
[ Donor] :  donor [Basiliximab] : basiliximab [Steroids] : steroids [Negative/Positive] : Donor Negative/Recipient Positive [] : Yes [Terminal Creatinine: ____] : Terminal Creatinine: [unfilled] [KDPI: ____] : Kidney Donor Profile Index: [unfilled] [TextBox_7] : 5/25/23 [FreeTextEntry4] : 68 year old male with  ESRD on HD, DM, HTN, and anemia s/p kidney transplantation. \par Donor- KDPI 96%, Terminal Cr 0.8\par Last IHD was on 5/24/23 [de-identified] : Pt not feeling well.  Has some FELTON and cough at night.  Bringing up phlegm.  Daughter gave him Albuterol.  He also has a poor apatite.  Blood pressure ok.

## 2023-06-26 NOTE — PLAN
[FreeTextEntry1] : 1.s/p DDRT on 5/24/23- Had slow graft function from ATN which has largely resolved. Last creatinine down to 1.5. \par 2. IS meds- Simulect Induction .Dosing tac by level. goal level 8-10 . MMF 500mgs bid and prednisone 5.  Hold MMF in setting of possible pneumonia. Nystatin, Valcyte and Bactrim. \par 3. HTN - pressure ok, fluctuates. \par 4. Anemia - has folate deficiency, start folic acid 1mg daily and given procrit. \par 5. Diabetes 2 - Blood glucose high in the evening.  Saw endocrine today and blood sugars adjusted.  Ok to take gabapentin\par 6.  Depression - back on prozac. \par 7.  Pulmonary - possible pneumonia.  Will check stat CXR and start levoquin 750mgs daily.  If multifocal pneumonia or symptoms get worse will admit for IV antibiotics. \par \par f/u in 1 week.

## 2023-06-27 LAB
HCV RNA SERPL NAA+PROBE-LOG IU: NOT DETECTED LOGIU/ML
HEPB DNA PCR INT: NOT DETECTED
HEPB DNA PCR LOG: NOT DETECTED LOGIU/ML
HEPC RNA INTERP: NOT DETECTED

## 2023-07-07 RX ORDER — LACTOSE-REDUCED FOOD
LIQUID (ML) ORAL
Qty: 1 | Refills: 0 | Status: ACTIVE | COMMUNITY
Start: 2023-07-07 | End: 1900-01-01

## 2023-07-07 RX ORDER — TAMSULOSIN HYDROCHLORIDE 0.4 MG/1
0.4 CAPSULE ORAL
Qty: 30 | Refills: 10 | Status: ACTIVE | COMMUNITY
Start: 2023-06-05 | End: 1900-01-01

## 2023-07-07 RX ORDER — LINAGLIPTIN 5 MG/1
5 TABLET, FILM COATED ORAL DAILY
Qty: 30 | Refills: 11 | Status: ACTIVE | COMMUNITY
Start: 2023-06-05 | End: 1900-01-01

## 2023-07-10 ENCOUNTER — APPOINTMENT (OUTPATIENT)
Dept: NEPHROLOGY | Facility: CLINIC | Age: 68
End: 2023-07-10
Payer: MEDICAID

## 2023-07-10 VITALS
HEART RATE: 79 BPM | WEIGHT: 126 LBS | DIASTOLIC BLOOD PRESSURE: 77 MMHG | HEIGHT: 62 IN | SYSTOLIC BLOOD PRESSURE: 158 MMHG | OXYGEN SATURATION: 100 % | BODY MASS INDEX: 23.19 KG/M2 | RESPIRATION RATE: 17 BRPM

## 2023-07-10 DIAGNOSIS — Z99.2 END STAGE RENAL DISEASE: ICD-10-CM

## 2023-07-10 DIAGNOSIS — N18.6 END STAGE RENAL DISEASE: ICD-10-CM

## 2023-07-10 LAB
ALBUMIN SERPL ELPH-MCNC: 4 G/DL
ALP BLD-CCNC: 114 U/L
ALT SERPL-CCNC: 14 U/L
ANION GAP SERPL CALC-SCNC: 11 MMOL/L
APPEARANCE: ABNORMAL
AST SERPL-CCNC: 15 U/L
BACTERIA: NEGATIVE /HPF
BILIRUB SERPL-MCNC: 0.3 MG/DL
BILIRUBIN URINE: NEGATIVE
BLOOD URINE: NEGATIVE
BUN SERPL-MCNC: 34 MG/DL
CALCIUM SERPL-MCNC: 8.7 MG/DL
CALCIUM SERPL-MCNC: 8.7 MG/DL
CAST: 0 /LPF
CHLORIDE SERPL-SCNC: 107 MMOL/L
CO2 SERPL-SCNC: 22 MMOL/L
COLOR: YELLOW
CREAT SERPL-MCNC: 1.9 MG/DL
CREAT SPEC-SCNC: 72 MG/DL
CREAT/PROT UR: 0.2 RATIO
EGFR: 38 ML/MIN/1.73M2
EPITHELIAL CELLS: 0 /HPF
GLUCOSE QUALITATIVE U: NEGATIVE MG/DL
GLUCOSE SERPL-MCNC: 118 MG/DL
KETONES URINE: NEGATIVE MG/DL
LEUKOCYTE ESTERASE URINE: NEGATIVE
MAGNESIUM SERPL-MCNC: 1.5 MG/DL
MICROSCOPIC-UA: NORMAL
NITRITE URINE: NEGATIVE
PARATHYROID HORMONE INTACT: 176 PG/ML
PH URINE: 6
PHOSPHATE SERPL-MCNC: 2.5 MG/DL
POTASSIUM SERPL-SCNC: 4.3 MMOL/L
PROT SERPL-MCNC: 5.9 G/DL
PROT UR-MCNC: 12 MG/DL
PROTEIN URINE: NORMAL MG/DL
RED BLOOD CELLS URINE: 1 /HPF
SODIUM SERPL-SCNC: 140 MMOL/L
SPECIFIC GRAVITY URINE: 1.01
TACROLIMUS SERPL-MCNC: 12.3 NG/ML
URATE SERPL-MCNC: 6.9 MG/DL
UROBILINOGEN URINE: 0.2 MG/DL
WHITE BLOOD CELLS URINE: 0 /HPF

## 2023-07-10 PROCEDURE — 99214 OFFICE O/P EST MOD 30 MIN: CPT

## 2023-07-10 RX ORDER — FUROSEMIDE 20 MG/1
20 TABLET ORAL DAILY
Qty: 30 | Refills: 1 | Status: DISCONTINUED | COMMUNITY
Start: 2023-06-27 | End: 2023-07-10

## 2023-07-10 RX ORDER — ELECTROLYTES/DEXTROSE
32G X 4 MM SOLUTION, ORAL ORAL
Qty: 1 | Refills: 3 | Status: ACTIVE | COMMUNITY
Start: 2023-05-31 | End: 1900-01-01

## 2023-07-10 RX ORDER — INSULIN GLARGINE 100 [IU]/ML
100 INJECTION, SOLUTION SUBCUTANEOUS
Qty: 5 | Refills: 0 | Status: DISCONTINUED | COMMUNITY
Start: 2023-06-12 | End: 2023-07-10

## 2023-07-10 RX ORDER — TACROLIMUS 1 MG/1
1 TABLET, EXTENDED RELEASE ORAL
Qty: 30 | Refills: 11 | Status: DISCONTINUED | COMMUNITY
Start: 2023-06-26 | End: 2023-07-10

## 2023-07-10 RX ORDER — LEVOFLOXACIN 750 MG/1
750 TABLET, FILM COATED ORAL DAILY
Qty: 10 | Refills: 0 | Status: DISCONTINUED | COMMUNITY
Start: 2023-06-26 | End: 2023-07-10

## 2023-07-10 RX ORDER — GUAIFENESIN 600 MG/1
600 TABLET, EXTENDED RELEASE ORAL
Qty: 20 | Refills: 0 | Status: DISCONTINUED | COMMUNITY
Start: 2023-06-27 | End: 2023-07-10

## 2023-07-10 NOTE — HISTORY OF PRESENT ILLNESS
[ Donor] :  donor [Basiliximab] : basiliximab [Steroids] : steroids [Negative/Positive] : Donor Negative/Recipient Positive [] : Yes [Terminal Creatinine: ____] : Terminal Creatinine: [unfilled] [KDPI: ____] : Kidney Donor Profile Index: [unfilled] [TextBox_7] : 5/25/23 [FreeTextEntry4] : 68 year old male with  ESRD on HD, DM, HTN, and anemia s/p kidney transplantation. \par Donor- KDPI 96%, Terminal Cr 0.8\par Last IHD was on 5/24/23 [de-identified] : Pt feeling much better.  Dyspnea and cough resolved.  Creatinine increased but tacrolimus trough elevated.

## 2023-07-10 NOTE — PLAN
[FreeTextEntry1] : 1.s/p DDRT on 5/24/23- Had slow graft function from ATN which has largely resolved. Creatinine down to 1.5 then increased in setting of elevated FK. \par 2. IS meds- Simulect Induction .Dosing tac by level. goal level 8-10 Level high, reduce to 8mgs, resume MMF 500mgs BID and cont pred 5.  \par 3. HTN - pressure ok, fluctuates. \par 4. Anemia - has folate deficiency, start folic acid 1mg daily.  Has received 1 dose of procrit, will obtain more and inject weekly. \par 5. Diabetes 2 - Blood glucose high in the evening.  Saw endocrine today and blood sugars adjusted.  Ok to take gabapentin\par 6.  Depression - back on prozac. \par 7.  Bronchitis - resolved.  Completed levoquin.  Stop lasix for EDWARDO.  No edema.\par \par f/u in 1 week.

## 2023-07-10 NOTE — PHYSICAL EXAM
[General Appearance - Alert] : alert [General Appearance - In No Acute Distress] : in no acute distress [General Appearance - Well Nourished] : well nourished [General Appearance - Well Developed] : well developed [Sclera] : the sclera and conjunctiva were normal [Extraocular Movements] : extraocular movements were intact [Strabismus] : no strabismus was seen [Outer Ear] : the ears and nose were normal in appearance [Hearing Threshold Finger Rub Not [place]] : hearing was normal [Nasal Cavity] : the nasal mucosa and septum were normal [Neck Appearance] : the appearance of the neck was normal [Neck Cervical Mass (___cm)] : no neck mass was observed [Respiration, Rhythm And Depth] : normal respiratory rhythm and effort [Exaggerated Use Of Accessory Muscles For Inspiration] : no accessory muscle use [Heart Rate And Rhythm] : heart rate was normal and rhythm regular [Heart Sounds Gallop] : no gallops [Heart Sounds] : normal S1 and S2 [Edema] : there was no peripheral edema [Bowel Sounds] : normal bowel sounds [Abdomen Soft] : soft [Abdomen Tenderness] : non-tender [Cervical Lymph Nodes Enlarged Posterior Bilaterally] : posterior cervical [Cervical Lymph Nodes Enlarged Anterior Bilaterally] : anterior cervical [Supraclavicular Lymph Nodes Enlarged Bilaterally] : supraclavicular [Abnormal Walk] : normal gait [Nail Clubbing] : no clubbing  or cyanosis of the fingernails [Musculoskeletal - Swelling] : no joint swelling seen [Skin Color & Pigmentation] : normal skin color and pigmentation [Skin Turgor] : normal skin turgor [] : no rash [Cranial Nerves] : cranial nerves 2-12 were intact [No Focal Deficits] : no focal deficits [Oriented To Time, Place, And Person] : oriented to person, place, and time [Impaired Insight] : insight and judgment were intact [Affect] : the affect was normal [FreeTextEntry1] : incision c/d/i with staples in place.

## 2023-07-11 LAB — BKV DNA SPEC QL NAA+PROBE: NOT DETECTED IU/ML

## 2023-07-17 LAB
ALBUMIN SERPL ELPH-MCNC: 4.3 G/DL
ALP BLD-CCNC: 111 U/L
ALT SERPL-CCNC: 21 U/L
ANION GAP SERPL CALC-SCNC: 11 MMOL/L
APPEARANCE: CLEAR
AST SERPL-CCNC: 20 U/L
BACTERIA: NEGATIVE /HPF
BILIRUB SERPL-MCNC: 0.4 MG/DL
BILIRUBIN URINE: NEGATIVE
BKV DNA SPEC QL NAA+PROBE: NOT DETECTED IU/ML
BLOOD URINE: NEGATIVE
BUN SERPL-MCNC: 32 MG/DL
CALCIUM SERPL-MCNC: 9.4 MG/DL
CALCIUM SERPL-MCNC: 9.4 MG/DL
CAST: 0 /LPF
CHLORIDE SERPL-SCNC: 106 MMOL/L
CO2 SERPL-SCNC: 21 MMOL/L
COLOR: YELLOW
CREAT SERPL-MCNC: 1.8 MG/DL
CREAT SPEC-SCNC: 70 MG/DL
CREAT/PROT UR: 0.2 RATIO
EGFR: 41 ML/MIN/1.73M2
EPITHELIAL CELLS: 0 /HPF
GLUCOSE QUALITATIVE U: NEGATIVE MG/DL
GLUCOSE SERPL-MCNC: 100 MG/DL
KETONES URINE: NEGATIVE MG/DL
LEUKOCYTE ESTERASE URINE: NEGATIVE
MAGNESIUM SERPL-MCNC: 1.7 MG/DL
MICROSCOPIC-UA: NORMAL
NITRITE URINE: NEGATIVE
PARATHYROID HORMONE INTACT: 137 PG/ML
PH URINE: 6.5
PHOSPHATE SERPL-MCNC: 3.2 MG/DL
POTASSIUM SERPL-SCNC: 4.9 MMOL/L
PROT SERPL-MCNC: 6.1 G/DL
PROT UR-MCNC: 14 MG/DL
PROTEIN URINE: NORMAL MG/DL
RED BLOOD CELLS URINE: 1 /HPF
SODIUM SERPL-SCNC: 137 MMOL/L
SPECIFIC GRAVITY URINE: 1.01
TACROLIMUS SERPL-MCNC: 12.1 NG/ML
URATE SERPL-MCNC: 6.1 MG/DL
UROBILINOGEN URINE: 0.2 MG/DL
WHITE BLOOD CELLS URINE: 0 /HPF

## 2023-07-24 ENCOUNTER — APPOINTMENT (OUTPATIENT)
Dept: NEPHROLOGY | Facility: CLINIC | Age: 68
End: 2023-07-24
Payer: MEDICAID

## 2023-07-24 VITALS
OXYGEN SATURATION: 100 % | SYSTOLIC BLOOD PRESSURE: 167 MMHG | RESPIRATION RATE: 17 BRPM | DIASTOLIC BLOOD PRESSURE: 72 MMHG | BODY MASS INDEX: 22.63 KG/M2 | HEART RATE: 75 BPM | WEIGHT: 123 LBS | TEMPERATURE: 98 F | HEIGHT: 62 IN

## 2023-07-24 LAB
ALBUMIN SERPL ELPH-MCNC: 4.5 G/DL
ALP BLD-CCNC: 120 U/L
ALT SERPL-CCNC: 18 U/L
ANION GAP SERPL CALC-SCNC: 8 MMOL/L
APPEARANCE: CLEAR
AST SERPL-CCNC: 16 U/L
BACTERIA: NEGATIVE /HPF
BILIRUB SERPL-MCNC: 0.3 MG/DL
BILIRUBIN URINE: NEGATIVE
BKV DNA SPEC QL NAA+PROBE: NOT DETECTED IU/ML
BLOOD URINE: NEGATIVE
BUN SERPL-MCNC: 30 MG/DL
CALCIUM SERPL-MCNC: 9.2 MG/DL
CALCIUM SERPL-MCNC: 9.2 MG/DL
CAST: 1 /LPF
CHLORIDE SERPL-SCNC: 106 MMOL/L
CO2 SERPL-SCNC: 22 MMOL/L
COLOR: YELLOW
CREAT SERPL-MCNC: 1.64 MG/DL
CREAT SPEC-SCNC: 65 MG/DL
CREAT/PROT UR: 0.3 RATIO
EGFR: 46 ML/MIN/1.73M2
EPITHELIAL CELLS: 0 /HPF
GLUCOSE QUALITATIVE U: 100 MG/DL
GLUCOSE SERPL-MCNC: 113 MG/DL
KETONES URINE: NEGATIVE MG/DL
LEUKOCYTE ESTERASE URINE: NEGATIVE
MAGNESIUM SERPL-MCNC: 1.9 MG/DL
MICROSCOPIC-UA: NORMAL
NITRITE URINE: NEGATIVE
PARATHYROID HORMONE INTACT: 140 PG/ML
PH URINE: 6
PHOSPHATE SERPL-MCNC: 3 MG/DL
POTASSIUM SERPL-SCNC: 5 MMOL/L
PROT SERPL-MCNC: 6.7 G/DL
PROT UR-MCNC: 19 MG/DL
PROTEIN URINE: NORMAL MG/DL
RED BLOOD CELLS URINE: 1 /HPF
SODIUM SERPL-SCNC: 137 MMOL/L
SPECIFIC GRAVITY URINE: 1.01
TACROLIMUS SERPL-MCNC: 9.5 NG/ML
URATE SERPL-MCNC: 5.1 MG/DL
UROBILINOGEN URINE: 0.2 MG/DL
WHITE BLOOD CELLS URINE: 0 /HPF

## 2023-07-24 PROCEDURE — 99214 OFFICE O/P EST MOD 30 MIN: CPT

## 2023-07-24 RX ORDER — FLUOXETINE HYDROCHLORIDE 20 MG/1
20 CAPSULE ORAL
Refills: 0 | Status: ACTIVE | COMMUNITY
Start: 2023-06-12

## 2023-07-24 NOTE — PHYSICAL EXAM
[General Appearance - Alert] : alert [General Appearance - In No Acute Distress] : in no acute distress [General Appearance - Well Nourished] : well nourished [General Appearance - Well Developed] : well developed [Sclera] : the sclera and conjunctiva were normal [Extraocular Movements] : extraocular movements were intact [Strabismus] : no strabismus was seen [Outer Ear] : the ears and nose were normal in appearance [Hearing Threshold Finger Rub Not [place]] : hearing was normal [Nasal Cavity] : the nasal mucosa and septum were normal [Neck Appearance] : the appearance of the neck was normal [Neck Cervical Mass (___cm)] : no neck mass was observed [Respiration, Rhythm And Depth] : normal respiratory rhythm and effort [Exaggerated Use Of Accessory Muscles For Inspiration] : no accessory muscle use [Heart Rate And Rhythm] : heart rate was normal and rhythm regular [Heart Sounds] : normal S1 and S2 [Heart Sounds Gallop] : no gallops [Edema] : there was no peripheral edema [Bowel Sounds] : normal bowel sounds [Abdomen Soft] : soft [Abdomen Tenderness] : non-tender [Cervical Lymph Nodes Enlarged Posterior Bilaterally] : posterior cervical [Cervical Lymph Nodes Enlarged Anterior Bilaterally] : anterior cervical [Supraclavicular Lymph Nodes Enlarged Bilaterally] : supraclavicular [Nail Clubbing] : no clubbing  or cyanosis of the fingernails [Abnormal Walk] : normal gait [Musculoskeletal - Swelling] : no joint swelling seen [Skin Color & Pigmentation] : normal skin color and pigmentation [Skin Turgor] : normal skin turgor [] : no rash [Cranial Nerves] : cranial nerves 2-12 were intact [No Focal Deficits] : no focal deficits [Oriented To Time, Place, And Person] : oriented to person, place, and time [Impaired Insight] : insight and judgment were intact [Affect] : the affect was normal

## 2023-07-24 NOTE — PLAN
[FreeTextEntry1] : 1.s/p DDRT on 5/24/23- Had slow graft function from ATN which has largely resolved. Creatinine down to 1.5 then increased in setting of elevated FK.  Now down to 1.6 - close to riya. \par 2. IS meds- Simulect Induction .Dosing tac by level. goal level 8-10 Level ok.  Cont MMF 500mgs BID and cont pred 5.  If apatite does not improve, may need to change myfortic to imuran.  \par 3. HTN - pressure ok, fluctuates.  Move flomax to evening and may need to split nifedipine.  Pressure high in office but pt did not take morning meds.  \par 4. Anemia - has folate deficiency, now on folate and procrit (second dose today).  \par 5. Diabetes 2 - Blood glucose high in the evening.  Saw endocrine today and blood sugars adjusted.  Will increase to novolog 6 units with meals. \par 6.  Depression - back on prozac. \par \par f/u in 2 weeks.

## 2023-07-24 NOTE — HISTORY OF PRESENT ILLNESS
[ Donor] :  donor [Basiliximab] : basiliximab [Steroids] : steroids [Negative/Positive] : Donor Negative/Recipient Positive [] : Yes [Terminal Creatinine: ____] : Terminal Creatinine: [unfilled] [KDPI: ____] : Kidney Donor Profile Index: [unfilled] [TextBox_7] : 5/25/23 [FreeTextEntry4] : 68 year old male with  ESRD on HD, DM, HTN, and anemia s/p kidney transplantation. \par Donor- KDPI 96%, Terminal Cr 0.8\par Last IHD was on 5/24/23 [de-identified] : Pt feeling much better.  Last creatinine improved to 1.6 and tacro trough at goal.  Still not eating well, feels very weak after taking meds in the morning.  Not checking BP.

## 2023-08-04 RX ORDER — INSULIN ASPART 100 [IU]/ML
100 INJECTION, SOLUTION INTRAVENOUS; SUBCUTANEOUS
Qty: 5 | Refills: 3 | Status: DISCONTINUED | COMMUNITY
Start: 2023-05-31 | End: 2023-08-04

## 2023-08-05 ENCOUNTER — LABORATORY RESULT (OUTPATIENT)
Age: 68
End: 2023-08-05

## 2023-08-07 ENCOUNTER — APPOINTMENT (OUTPATIENT)
Dept: NEPHROLOGY | Facility: CLINIC | Age: 68
End: 2023-08-07
Payer: MEDICAID

## 2023-08-07 VITALS
HEART RATE: 80 BPM | TEMPERATURE: 98 F | RESPIRATION RATE: 16 BRPM | SYSTOLIC BLOOD PRESSURE: 155 MMHG | BODY MASS INDEX: 22.45 KG/M2 | OXYGEN SATURATION: 100 % | DIASTOLIC BLOOD PRESSURE: 78 MMHG | WEIGHT: 122 LBS | HEIGHT: 62 IN

## 2023-08-07 LAB
ALBUMIN SERPL ELPH-MCNC: 4.4 G/DL
ALP BLD-CCNC: 100 U/L
ALT SERPL-CCNC: 13 U/L
ANION GAP SERPL CALC-SCNC: 13 MMOL/L
APPEARANCE: CLEAR
AST SERPL-CCNC: 15 U/L
BACTERIA: NEGATIVE /HPF
BILIRUB SERPL-MCNC: 0.3 MG/DL
BILIRUBIN URINE: NEGATIVE
BLOOD URINE: NEGATIVE
BUN SERPL-MCNC: 28 MG/DL
CALCIUM SERPL-MCNC: 9.3 MG/DL
CALCIUM SERPL-MCNC: 9.3 MG/DL
CAST: 0 /LPF
CHLORIDE SERPL-SCNC: 107 MMOL/L
CO2 SERPL-SCNC: 20 MMOL/L
COLOR: YELLOW
CREAT SERPL-MCNC: 1.16 MG/DL
CREAT SPEC-SCNC: 49 MG/DL
CREAT/PROT UR: 0.4 RATIO
EGFR: 69 ML/MIN/1.73M2
EPITHELIAL CELLS: 0 /HPF
GLUCOSE QUALITATIVE U: 100 MG/DL
GLUCOSE SERPL-MCNC: 154 MG/DL
KETONES URINE: NEGATIVE MG/DL
LEUKOCYTE ESTERASE URINE: NEGATIVE
MAGNESIUM SERPL-MCNC: 1.9 MG/DL
MICROSCOPIC-UA: NORMAL
NITRITE URINE: NEGATIVE
PARATHYROID HORMONE INTACT: 121 PG/ML
PH URINE: 6.5
PHOSPHATE SERPL-MCNC: 3.2 MG/DL
POTASSIUM SERPL-SCNC: 4.4 MMOL/L
PROT SERPL-MCNC: 6.4 G/DL
PROT UR-MCNC: 18 MG/DL
PROTEIN URINE: NORMAL MG/DL
RED BLOOD CELLS URINE: 1 /HPF
SODIUM SERPL-SCNC: 140 MMOL/L
SPECIFIC GRAVITY URINE: 1.01
TACROLIMUS SERPL-MCNC: 8.5 NG/ML
URATE SERPL-MCNC: 4.4 MG/DL
UROBILINOGEN URINE: 0.2 MG/DL
WHITE BLOOD CELLS URINE: 0 /HPF

## 2023-08-07 PROCEDURE — 99214 OFFICE O/P EST MOD 30 MIN: CPT

## 2023-08-07 NOTE — PLAN
[FreeTextEntry1] : 1.s/p DDRT on 5/24/23- Had slow graft function from ATN which has largely resolved. Creatinine down to 1.1. 2. IS meds- Simulect Induction .Dosing tac by level. goal level 8-10 Level ok.  Cont MMF 500mgs BID and cont pred 5.  3. HTN - pressure ok, fluctuates.  Move flomax to evening and change nifedipine to 30mgs BID.   4. Anemia - has folate deficiency, now on folate and procrit (third dose today).  Hb improving, still < 10.   5. Diabetes 2 - Blood glucose high in the evening.  Increase pre meal insulin to 8 units.  6.  Depression - back on prozac.   f/u in 2 weeks.

## 2023-08-07 NOTE — PHYSICAL EXAM
[General Appearance - Alert] : alert [General Appearance - In No Acute Distress] : in no acute distress [General Appearance - Well Nourished] : well nourished [General Appearance - Well Developed] : well developed [Sclera] : the sclera and conjunctiva were normal [Extraocular Movements] : extraocular movements were intact [Strabismus] : no strabismus was seen [Outer Ear] : the ears and nose were normal in appearance [Hearing Threshold Finger Rub Not [place]] : hearing was normal [Nasal Cavity] : the nasal mucosa and septum were normal [Neck Appearance] : the appearance of the neck was normal [Neck Cervical Mass (___cm)] : no neck mass was observed [Respiration, Rhythm And Depth] : normal respiratory rhythm and effort [Exaggerated Use Of Accessory Muscles For Inspiration] : no accessory muscle use [Heart Rate And Rhythm] : heart rate was normal and rhythm regular [Heart Sounds] : normal S1 and S2 [Heart Sounds Gallop] : no gallops [Edema] : there was no peripheral edema [Bowel Sounds] : normal bowel sounds [Abdomen Soft] : soft [Abdomen Tenderness] : non-tender [Cervical Lymph Nodes Enlarged Posterior Bilaterally] : posterior cervical [Cervical Lymph Nodes Enlarged Anterior Bilaterally] : anterior cervical [Supraclavicular Lymph Nodes Enlarged Bilaterally] : supraclavicular [Abnormal Walk] : normal gait [Nail Clubbing] : no clubbing  or cyanosis of the fingernails [Musculoskeletal - Swelling] : no joint swelling seen [Skin Color & Pigmentation] : normal skin color and pigmentation [Skin Turgor] : normal skin turgor [] : no rash [Cranial Nerves] : cranial nerves 2-12 were intact [No Focal Deficits] : no focal deficits [Oriented To Time, Place, And Person] : oriented to person, place, and time [Impaired Insight] : insight and judgment were intact [Affect] : the affect was normal

## 2023-08-07 NOTE — HISTORY OF PRESENT ILLNESS
[ Donor] :  donor [Basiliximab] : basiliximab [Steroids] : steroids [Negative/Positive] : Donor Negative/Recipient Positive [] : Yes [Terminal Creatinine: ____] : Terminal Creatinine: [unfilled] [KDPI: ____] : Kidney Donor Profile Index: [unfilled] [TextBox_7] : 5/25/23 [FreeTextEntry4] : 68 year old male with  ESRD on HD, DM, HTN, and anemia s/p kidney transplantation. \par  Donor- KDPI 96%, Terminal Cr 0.8\par  Last IHD was on 5/24/23 [de-identified] : Pt feeling much better.  Last creatinine improved to 1.1 and tacro trough at goal.  He is eating better and has better energy.

## 2023-08-08 ENCOUNTER — NON-APPOINTMENT (OUTPATIENT)
Age: 68
End: 2023-08-08

## 2023-08-08 LAB — BKV DNA SPEC QL NAA+PROBE: NOT DETECTED IU/ML

## 2023-08-09 ENCOUNTER — RX RENEWAL (OUTPATIENT)
Age: 68
End: 2023-08-09

## 2023-08-09 RX ORDER — SULFAMETHOXAZOLE AND TRIMETHOPRIM 400; 80 MG/1; MG/1
400-80 TABLET ORAL DAILY
Qty: 30 | Refills: 0 | Status: ACTIVE | COMMUNITY
Start: 2023-05-26 | End: 1900-01-01

## 2023-08-10 ENCOUNTER — NON-APPOINTMENT (OUTPATIENT)
Age: 68
End: 2023-08-10

## 2023-08-16 ENCOUNTER — TRANSCRIPTION ENCOUNTER (OUTPATIENT)
Age: 68
End: 2023-08-16

## 2023-08-17 ENCOUNTER — TRANSCRIPTION ENCOUNTER (OUTPATIENT)
Age: 68
End: 2023-08-17

## 2023-08-17 ENCOUNTER — OUTPATIENT (OUTPATIENT)
Dept: OUTPATIENT SERVICES | Facility: HOSPITAL | Age: 68
LOS: 1 days | End: 2023-08-17
Payer: MEDICARE

## 2023-08-17 ENCOUNTER — APPOINTMENT (OUTPATIENT)
Dept: TRANSPLANT | Facility: HOSPITAL | Age: 68
End: 2023-08-17

## 2023-08-17 VITALS
TEMPERATURE: 97 F | DIASTOLIC BLOOD PRESSURE: 75 MMHG | OXYGEN SATURATION: 100 % | RESPIRATION RATE: 15 BRPM | SYSTOLIC BLOOD PRESSURE: 167 MMHG | HEART RATE: 70 BPM

## 2023-08-17 VITALS
OXYGEN SATURATION: 100 % | HEART RATE: 82 BPM | HEIGHT: 62 IN | WEIGHT: 123.02 LBS | RESPIRATION RATE: 15 BRPM | TEMPERATURE: 99 F | DIASTOLIC BLOOD PRESSURE: 71 MMHG | SYSTOLIC BLOOD PRESSURE: 170 MMHG

## 2023-08-17 DIAGNOSIS — Z94.0 KIDNEY TRANSPLANT STATUS: ICD-10-CM

## 2023-08-17 LAB — GLUCOSE BLDC GLUCOMTR-MCNC: 140 MG/DL — HIGH (ref 70–99)

## 2023-08-17 PROCEDURE — 52310 CYSTOSCOPY AND TREATMENT: CPT

## 2023-08-17 PROCEDURE — 82962 GLUCOSE BLOOD TEST: CPT

## 2023-08-17 PROCEDURE — 52310 CYSTOSCOPY AND TREATMENT: CPT | Mod: 58

## 2023-08-17 PROCEDURE — C1747: CPT

## 2023-08-17 DEVICE — ASCOPE 4 CYSTOSCOPE FLEX 6X390MM
Type: IMPLANTABLE DEVICE | Site: RIGHT | Status: NON-FUNCTIONAL
Removed: 2023-08-17

## 2023-08-17 RX ORDER — LINAGLIPTIN 5 MG/1
1 TABLET, FILM COATED ORAL
Qty: 0 | Refills: 0 | DISCHARGE

## 2023-08-17 RX ORDER — SODIUM CHLORIDE 9 MG/ML
3 INJECTION INTRAMUSCULAR; INTRAVENOUS; SUBCUTANEOUS EVERY 8 HOURS
Refills: 0 | Status: DISCONTINUED | OUTPATIENT
Start: 2023-08-17 | End: 2023-08-31

## 2023-08-17 NOTE — ASU PREOP CHECKLIST - SITE MARKED BY ANESTHESIOLOGIST
Problem: PHYSICAL THERAPY ADULT  Goal: Performs mobility at highest level of function for planned discharge setting  See evaluation for individualized goals  Description: Treatment/Interventions: Functional transfer training, LE strengthening/ROM, Elevations, Therapeutic exercise, Endurance training, Patient/family training, Equipment eval/education, Bed mobility, Gait training, Compensatory technique education, Spoke to nursing, Spoke to case management, OT  Equipment Recommended:  (TBD by rehab)       See flowsheet documentation for full assessment, interventions and recommendations  [id number] by Cynthia Leblanc PT  Note: Prognosis: Good  Problem List: Decreased strength, Decreased endurance, Impaired balance, Decreased mobility, Impaired judgement, Decreased safety awareness, Obesity  Pt requires min cues for safety with transfers and improved gait pattern with RW  She was able to ambulate slight increased distance, but is limited by fatigue, decreased strength, balance  She continues to require assistance for transfers and ambulation due to deficits above  She requires increased time to respond to commands  She will continue to benefit from PT services to maximize LOF  Barriers to Discharge: Decreased caregiver support, Inaccessible home environment  Barriers to Discharge Comments: requires increased assistance to complete mobility, spouse works during the day so patient would need to be able to care for self during the day  PT Discharge Recommendation: Post acute rehabilitation services     PT - OK to Discharge:  (when medically cleared to rehab)    See flowsheet documentation for full assessment  n/a

## 2023-08-17 NOTE — H&P PST ADULT - ATTENDING COMMENTS
68M s/p DDRT 5/25/23 with good renal function (Cr 1.1) for cystoscopy with removal of ureteral stent from right transplant kidney/bladder  Details of procedure discussed, including risks of hematuria, dysuria, EDWARDO, UTI

## 2023-08-17 NOTE — ASU PATIENT PROFILE, ADULT - FALL HARM RISK - FALLEN IN PAST
Patient called to schedule appointment for right foot discomfort when she walks. Patient informed that Dr. Peralta Or didn't have any openings until February. I let the patient know that I would ask if [place] works with Sumakezia Saavedrahalina and then call her back. Patient voiced understanding. No

## 2023-08-17 NOTE — ASU DISCHARGE PLAN (ADULT/PEDIATRIC) - NS MD DC FALL RISK RISK
For information on Fall & Injury Prevention, visit: [email or web address] OR  [email or web address] OR  https://www.cdc.gov/steadi/patient.html

## 2023-08-17 NOTE — H&P PST ADULT - HISTORY OF PRESENT ILLNESS
68M s/p DDRT 5/25/23 with good renal function (Cr 1.1) for cystoscopy with removal of ureteral stent from right transplant kidney/bladder  Patient denies any fevers, abdominal pain, nausea, diarrhea, hematuria or dysuria.

## 2023-08-17 NOTE — BRIEF OPERATIVE NOTE - NSICDXBRIEFPROCEDURE_GEN_ALL_CORE_FT
PROCEDURES:  Cystoscopy with removal of double-J ureteral stent 17-Aug-2023 07:58:55  Anders Michele

## 2023-08-26 ENCOUNTER — LABORATORY RESULT (OUTPATIENT)
Age: 68
End: 2023-08-26

## 2023-08-28 ENCOUNTER — APPOINTMENT (OUTPATIENT)
Dept: NEPHROLOGY | Facility: CLINIC | Age: 68
End: 2023-08-28
Payer: MEDICARE

## 2023-08-28 VITALS
BODY MASS INDEX: 22.82 KG/M2 | WEIGHT: 124 LBS | TEMPERATURE: 98 F | HEIGHT: 62 IN | RESPIRATION RATE: 15 BRPM | SYSTOLIC BLOOD PRESSURE: 167 MMHG | DIASTOLIC BLOOD PRESSURE: 83 MMHG | OXYGEN SATURATION: 99 % | HEART RATE: 87 BPM

## 2023-08-28 LAB
ALBUMIN SERPL ELPH-MCNC: 4.7 G/DL
ALP BLD-CCNC: 92 U/L
ALT SERPL-CCNC: 11 U/L
ANION GAP SERPL CALC-SCNC: 11 MMOL/L
APPEARANCE: CLEAR
AST SERPL-CCNC: 19 U/L
BILIRUB SERPL-MCNC: 0.4 MG/DL
BILIRUBIN URINE: NEGATIVE
BKV DNA SPEC QL NAA+PROBE: NOT DETECTED IU/ML
BLOOD URINE: NEGATIVE
BUN SERPL-MCNC: 22 MG/DL
CALCIUM SERPL-MCNC: 9.3 MG/DL
CHLORIDE SERPL-SCNC: 105 MMOL/L
CO2 SERPL-SCNC: 21 MMOL/L
COLOR: YELLOW
CREAT SERPL-MCNC: 1.17 MG/DL
EGFR: 68 ML/MIN/1.73M2
GLUCOSE QUALITATIVE U: NEGATIVE MG/DL
GLUCOSE SERPL-MCNC: 119 MG/DL
KETONES URINE: NEGATIVE MG/DL
LEUKOCYTE ESTERASE URINE: NEGATIVE
MAGNESIUM SERPL-MCNC: 1.8 MG/DL
NITRITE URINE: NEGATIVE
PH URINE: 5.5
PHOSPHATE SERPL-MCNC: 3.4 MG/DL
POTASSIUM SERPL-SCNC: 4.4 MMOL/L
PROT SERPL-MCNC: 6.6 G/DL
PROTEIN URINE: 30 MG/DL
SODIUM SERPL-SCNC: 136 MMOL/L
SPECIFIC GRAVITY URINE: 1.02
TACROLIMUS SERPL-MCNC: 17.7 NG/ML
URATE SERPL-MCNC: 4.1 MG/DL
UROBILINOGEN URINE: 0.2 MG/DL

## 2023-08-28 PROCEDURE — 99214 OFFICE O/P EST MOD 30 MIN: CPT

## 2023-08-28 RX ORDER — NYSTATIN 100000 [USP'U]/ML
100000 SUSPENSION ORAL 4 TIMES DAILY
Qty: 600 | Refills: 4 | Status: DISCONTINUED | COMMUNITY
Start: 2023-05-26 | End: 2023-08-28

## 2023-08-28 RX ORDER — SODIUM ZIRCONIUM CYCLOSILICATE 10 G/10G
10 POWDER, FOR SUSPENSION ORAL
Qty: 1 | Refills: 1 | Status: DISCONTINUED | COMMUNITY
Start: 2023-06-05 | End: 2023-08-28

## 2023-08-28 NOTE — PLAN
[FreeTextEntry1] : 1.s/p DDRT on 5/24/23- Had slow graft function from ATN which has largely resolved. Creatinine down to 1.1. 2. IS meds- Simulect Induction.  Dosing tac by level. goal level 7-10.  Level is high, will reduce Envarsus to 5mgs daily.  Cont MMF 500mgs BID and continue pred 5.  3. HTN - pressure elevated, change nifedipine to 60mgs BID.   4. Anemia - has folate deficiency, now on folate and procrit.   5. Diabetes 2 - Blood glucose improved.  6.  Depression - back on prozac.   f/u in 2 weeks.

## 2023-08-28 NOTE — PHYSICAL EXAM
[General Appearance - Alert] : alert [General Appearance - In No Acute Distress] : in no acute distress [General Appearance - Well Nourished] : well nourished [General Appearance - Well Developed] : well developed [Sclera] : the sclera and conjunctiva were normal [Extraocular Movements] : extraocular movements were intact [Strabismus] : no strabismus was seen [Outer Ear] : the ears and nose were normal in appearance [Hearing Threshold Finger Rub Not [place]] : hearing was normal [Nasal Cavity] : the nasal mucosa and septum were normal [Neck Appearance] : the appearance of the neck was normal [Neck Cervical Mass (___cm)] : no neck mass was observed [Respiration, Rhythm And Depth] : normal respiratory rhythm and effort [Exaggerated Use Of Accessory Muscles For Inspiration] : no accessory muscle use [Heart Rate And Rhythm] : heart rate was normal and rhythm regular [Heart Sounds] : normal S1 and S2 [Heart Sounds Gallop] : no gallops [Edema] : there was no peripheral edema [Bowel Sounds] : normal bowel sounds [Abdomen Soft] : soft [Abdomen Tenderness] : non-tender [Cervical Lymph Nodes Enlarged Posterior Bilaterally] : posterior cervical [Cervical Lymph Nodes Enlarged Anterior Bilaterally] : anterior cervical [Supraclavicular Lymph Nodes Enlarged Bilaterally] : supraclavicular [Abnormal Walk] : normal gait [Nail Clubbing] : no clubbing  or cyanosis of the fingernails [Musculoskeletal - Swelling] : no joint swelling seen [Skin Color & Pigmentation] : normal skin color and pigmentation [Skin Turgor] : normal skin turgor [] : no rash [Cranial Nerves] : cranial nerves 2-12 were intact [No Focal Deficits] : no focal deficits [Oriented To Time, Place, And Person] : oriented to person, place, and time [Impaired Insight] : insight and judgment were intact [Affect] : the affect was normal

## 2023-08-28 NOTE — HISTORY OF PRESENT ILLNESS
[ Donor] :  donor [Basiliximab] : basiliximab [Steroids] : steroids [Negative/Positive] : Donor Negative/Recipient Positive [] : Yes [Terminal Creatinine: ____] : Terminal Creatinine: [unfilled] [KDPI: ____] : Kidney Donor Profile Index: [unfilled] [TextBox_7] : 5/25/23 [FreeTextEntry4] : 68 year old male with  ESRD on HD, DM, HTN, and anemia s/p kidney transplantation. \par  Donor- KDPI 96%, Terminal Cr 0.8\par  Last IHD was on 5/24/23 [de-identified] : Pt feeling well.  Tacro trough is elevated.  creatinine stable at 1.1.  Blood sugars ok, blood pressure has been high.

## 2023-09-01 ENCOUNTER — APPOINTMENT (OUTPATIENT)
Dept: NEPHROLOGY | Facility: CLINIC | Age: 68
End: 2023-09-01

## 2023-09-09 ENCOUNTER — LABORATORY RESULT (OUTPATIENT)
Age: 68
End: 2023-09-09

## 2023-09-11 ENCOUNTER — APPOINTMENT (OUTPATIENT)
Dept: NEPHROLOGY | Facility: CLINIC | Age: 68
End: 2023-09-11
Payer: MEDICARE

## 2023-09-11 VITALS
TEMPERATURE: 99.6 F | WEIGHT: 125 LBS | OXYGEN SATURATION: 98 % | SYSTOLIC BLOOD PRESSURE: 130 MMHG | BODY MASS INDEX: 23 KG/M2 | HEIGHT: 62 IN | DIASTOLIC BLOOD PRESSURE: 70 MMHG | HEART RATE: 80 BPM | RESPIRATION RATE: 15 BRPM

## 2023-09-11 DIAGNOSIS — E11.29 TYPE 2 DIABETES MELLITUS WITH OTHER DIABETIC KIDNEY COMPLICATION: ICD-10-CM

## 2023-09-11 LAB
ALBUMIN SERPL ELPH-MCNC: 4.6 G/DL
ALP BLD-CCNC: 98 U/L
ALT SERPL-CCNC: 10 U/L
ANION GAP SERPL CALC-SCNC: 13 MMOL/L
APPEARANCE: CLEAR
AST SERPL-CCNC: 17 U/L
BILIRUB SERPL-MCNC: 0.4 MG/DL
BILIRUBIN URINE: NEGATIVE
BKV DNA SPEC QL NAA+PROBE: NOT DETECTED IU/ML
BLOOD URINE: ABNORMAL
BUN SERPL-MCNC: 19 MG/DL
CALCIUM SERPL-MCNC: 9.3 MG/DL
CHLORIDE SERPL-SCNC: 107 MMOL/L
CO2 SERPL-SCNC: 19 MMOL/L
COLOR: NORMAL
CREAT SERPL-MCNC: 1.2 MG/DL
CREAT SPEC-SCNC: 60 MG/DL
EGFR: 66 ML/MIN/1.73M2
GLUCOSE QUALITATIVE U: ABNORMAL
GLUCOSE SERPL-MCNC: 118 MG/DL
HCT VFR BLD CALC: 32.8 %
HGB BLD-MCNC: 10.7 G/DL
KETONES URINE: NEGATIVE
LEUKOCYTE ESTERASE URINE: NEGATIVE
MAGNESIUM SERPL-MCNC: 1.8 MG/DL
MCHC RBC-ENTMCNC: 30.7 PG
MCHC RBC-ENTMCNC: 32.6 GM/DL
MCV RBC AUTO: 94.3 FL
MICROALBUMIN 24H UR DL<=1MG/L-MCNC: 4.6 MG/DL
MICROALBUMIN/CREAT 24H UR-RTO: 76 MG/G
NITRITE URINE: NEGATIVE
PH URINE: 5.5
PHOSPHATE SERPL-MCNC: 3.1 MG/DL
PLATELET # BLD AUTO: 316 K/UL
POTASSIUM SERPL-SCNC: 4.9 MMOL/L
PROT SERPL-MCNC: 6.5 G/DL
PROTEIN URINE: NEGATIVE
RBC # BLD: 3.48 M/UL
RBC # FLD: 15.9 %
SODIUM SERPL-SCNC: 139 MMOL/L
SPECIFIC GRAVITY URINE: 1.02
TACROLIMUS SERPL-MCNC: 4.8 NG/ML
URATE SERPL-MCNC: 4.1 MG/DL
UROBILINOGEN URINE: NORMAL
WBC # FLD AUTO: 5.85 K/UL

## 2023-09-11 PROCEDURE — 99214 OFFICE O/P EST MOD 30 MIN: CPT

## 2023-09-23 ENCOUNTER — LABORATORY RESULT (OUTPATIENT)
Age: 68
End: 2023-09-23

## 2023-09-25 ENCOUNTER — APPOINTMENT (OUTPATIENT)
Dept: NEPHROLOGY | Facility: CLINIC | Age: 68
End: 2023-09-25
Payer: MEDICARE

## 2023-09-25 VITALS
HEART RATE: 94 BPM | RESPIRATION RATE: 15 BRPM | TEMPERATURE: 96.7 F | SYSTOLIC BLOOD PRESSURE: 179 MMHG | DIASTOLIC BLOOD PRESSURE: 66 MMHG | BODY MASS INDEX: 23.37 KG/M2 | WEIGHT: 127 LBS | HEIGHT: 62 IN | OXYGEN SATURATION: 98 %

## 2023-09-25 VITALS — SYSTOLIC BLOOD PRESSURE: 142 MMHG | DIASTOLIC BLOOD PRESSURE: 50 MMHG

## 2023-09-25 LAB
ALBUMIN SERPL ELPH-MCNC: 4.8 G/DL
ALP BLD-CCNC: 99 U/L
ALT SERPL-CCNC: 8 U/L
ANION GAP SERPL CALC-SCNC: 11 MMOL/L
APPEARANCE: CLEAR
AST SERPL-CCNC: 16 U/L
BILIRUB SERPL-MCNC: 0.4 MG/DL
BILIRUBIN URINE: NEGATIVE
BKV DNA SPEC QL NAA+PROBE: NOT DETECTED IU/ML
BLOOD URINE: NEGATIVE
BUN SERPL-MCNC: 24 MG/DL
CALCIUM SERPL-MCNC: 9.2 MG/DL
CHLORIDE SERPL-SCNC: 106 MMOL/L
CO2 SERPL-SCNC: 23 MMOL/L
COLOR: YELLOW
CREAT SERPL-MCNC: 1.29 MG/DL
CREAT SPEC-SCNC: 53 MG/DL
EGFR: 61 ML/MIN/1.73M2
GLUCOSE QUALITATIVE U: 250 MG/DL
GLUCOSE SERPL-MCNC: 118 MG/DL
HCT VFR BLD CALC: 32.3 %
HGB BLD-MCNC: 10.2 G/DL
KETONES URINE: NEGATIVE MG/DL
LEUKOCYTE ESTERASE URINE: NEGATIVE
MAGNESIUM SERPL-MCNC: 1.8 MG/DL
MCHC RBC-ENTMCNC: 30.2 PG
MCHC RBC-ENTMCNC: 31.6 GM/DL
MCV RBC AUTO: 95.6 FL
MICROALBUMIN 24H UR DL<=1MG/L-MCNC: 7.9 MG/DL
MICROALBUMIN/CREAT 24H UR-RTO: 149 MG/G
NITRITE URINE: NEGATIVE
PH URINE: 6
PHOSPHATE SERPL-MCNC: 3.4 MG/DL
PLATELET # BLD AUTO: 320 K/UL
POTASSIUM SERPL-SCNC: 5.1 MMOL/L
PROT SERPL-MCNC: 6.6 G/DL
PROTEIN URINE: 30 MG/DL
RBC # BLD: 3.38 M/UL
RBC # FLD: 15.2 %
SODIUM SERPL-SCNC: 140 MMOL/L
SPECIFIC GRAVITY URINE: 1.02
TACROLIMUS SERPL-MCNC: 12 NG/ML
URATE SERPL-MCNC: 3.9 MG/DL
UROBILINOGEN URINE: 0.2 MG/DL
WBC # FLD AUTO: 7.47 K/UL

## 2023-09-25 PROCEDURE — 99214 OFFICE O/P EST MOD 30 MIN: CPT

## 2023-09-25 RX ORDER — TACROLIMUS 1 MG/1
1 TABLET, EXTENDED RELEASE ORAL DAILY
Qty: 90 | Refills: 11 | Status: DISCONTINUED | COMMUNITY
Start: 2023-07-17 | End: 2023-09-25

## 2023-09-26 ENCOUNTER — APPOINTMENT (OUTPATIENT)
Dept: UROLOGY | Facility: CLINIC | Age: 68
End: 2023-09-26

## 2023-10-10 ENCOUNTER — APPOINTMENT (OUTPATIENT)
Dept: TRANSPLANT | Facility: CLINIC | Age: 68
End: 2023-10-10

## 2023-10-23 ENCOUNTER — APPOINTMENT (OUTPATIENT)
Dept: NEPHROLOGY | Facility: CLINIC | Age: 68
End: 2023-10-23

## 2023-10-28 ENCOUNTER — LABORATORY RESULT (OUTPATIENT)
Age: 68
End: 2023-10-28

## 2023-10-30 ENCOUNTER — APPOINTMENT (OUTPATIENT)
Dept: NEPHROLOGY | Facility: CLINIC | Age: 68
End: 2023-10-30
Payer: MEDICARE

## 2023-10-30 VITALS
BODY MASS INDEX: 23.37 KG/M2 | HEIGHT: 62 IN | WEIGHT: 127 LBS | TEMPERATURE: 97.8 F | HEART RATE: 93 BPM | SYSTOLIC BLOOD PRESSURE: 135 MMHG | OXYGEN SATURATION: 98 % | DIASTOLIC BLOOD PRESSURE: 70 MMHG

## 2023-10-30 DIAGNOSIS — Z86.39 PERSONAL HISTORY OF OTHER ENDOCRINE, NUTRITIONAL AND METABOLIC DISEASE: ICD-10-CM

## 2023-10-30 LAB
ALBUMIN SERPL ELPH-MCNC: 4.6 G/DL
ALP BLD-CCNC: 103 U/L
ALT SERPL-CCNC: 11 U/L
ANION GAP SERPL CALC-SCNC: 9 MMOL/L
APPEARANCE: CLEAR
AST SERPL-CCNC: 15 U/L
BILIRUB SERPL-MCNC: 0.5 MG/DL
BILIRUBIN URINE: NEGATIVE
BKV DNA SPEC QL NAA+PROBE: NOT DETECTED IU/ML
BLOOD URINE: NEGATIVE
BUN SERPL-MCNC: 26 MG/DL
CALCIUM SERPL-MCNC: 9.3 MG/DL
CHLORIDE SERPL-SCNC: 106 MMOL/L
CO2 SERPL-SCNC: 24 MMOL/L
COLOR: YELLOW
CREAT SERPL-MCNC: 1.47 MG/DL
CREAT SPEC-SCNC: 80 MG/DL
EGFR: 52 ML/MIN/1.73M2
GLUCOSE QUALITATIVE U: 250 MG/DL
GLUCOSE SERPL-MCNC: 117 MG/DL
HCT VFR BLD CALC: 31.3 %
HGB BLD-MCNC: 10.1 G/DL
KETONES URINE: NEGATIVE MG/DL
LEUKOCYTE ESTERASE URINE: NEGATIVE
MAGNESIUM SERPL-MCNC: 1.9 MG/DL
MCHC RBC-ENTMCNC: 30.6 PG
MCHC RBC-ENTMCNC: 32.3 GM/DL
MCV RBC AUTO: 94.8 FL
MICROALBUMIN 24H UR DL<=1MG/L-MCNC: 8.2 MG/DL
MICROALBUMIN/CREAT 24H UR-RTO: 102 MG/G
NITRITE URINE: NEGATIVE
PH URINE: 5.5
PHOSPHATE SERPL-MCNC: 3.6 MG/DL
PLATELET # BLD AUTO: 334 K/UL
POTASSIUM SERPL-SCNC: 5.3 MMOL/L
PROT SERPL-MCNC: 6.6 G/DL
PROTEIN URINE: 30 MG/DL
RBC # BLD: 3.3 M/UL
RBC # FLD: 15 %
SODIUM SERPL-SCNC: 140 MMOL/L
SPECIFIC GRAVITY URINE: 1.02
TACROLIMUS SERPL-MCNC: 5 NG/ML
URATE SERPL-MCNC: 3.8 MG/DL
UROBILINOGEN URINE: 0.2 MG/DL
WBC # FLD AUTO: 4.2 K/UL

## 2023-10-30 PROCEDURE — 99214 OFFICE O/P EST MOD 30 MIN: CPT

## 2023-10-30 RX ORDER — TACROLIMUS 0.75 MG/1
0.75 TABLET, EXTENDED RELEASE ORAL DAILY
Qty: 60 | Refills: 11 | Status: DISCONTINUED | COMMUNITY
Start: 2023-09-25 | End: 2023-10-30

## 2023-10-30 RX ORDER — INSULIN LISPRO 100 [IU]/ML
100 INJECTION, SOLUTION SUBCUTANEOUS 3 TIMES DAILY
Qty: 5 | Refills: 3 | Status: ACTIVE | COMMUNITY
Start: 2023-08-04 | End: 1900-01-01

## 2023-11-11 ENCOUNTER — LABORATORY RESULT (OUTPATIENT)
Age: 68
End: 2023-11-11

## 2023-11-13 ENCOUNTER — NON-APPOINTMENT (OUTPATIENT)
Age: 68
End: 2023-11-13

## 2023-11-13 LAB
ALBUMIN SERPL ELPH-MCNC: 4.7 G/DL
ANION GAP SERPL CALC-SCNC: 11 MMOL/L
APPEARANCE: CLEAR
BILIRUBIN URINE: NEGATIVE
BLOOD URINE: NEGATIVE
BUN SERPL-MCNC: 27 MG/DL
CALCIUM SERPL-MCNC: 9.4 MG/DL
CHLORIDE SERPL-SCNC: 107 MMOL/L
CO2 SERPL-SCNC: 24 MMOL/L
COLOR: YELLOW
CREAT SERPL-MCNC: 1.32 MG/DL
EGFR: 59 ML/MIN/1.73M2
FERRITIN SERPL-MCNC: 158 NG/ML
FOLATE SERPL-MCNC: >20 NG/ML
GLUCOSE QUALITATIVE U: 500 MG/DL
GLUCOSE SERPL-MCNC: 139 MG/DL
HCT VFR BLD CALC: 31.8 %
HGB BLD-MCNC: 10.2 G/DL
IRON SATN MFR SERPL: 36 %
IRON SERPL-MCNC: 94 UG/DL
KETONES URINE: NEGATIVE MG/DL
LEUKOCYTE ESTERASE URINE: NEGATIVE
MCHC RBC-ENTMCNC: 30.5 PG
MCHC RBC-ENTMCNC: 32.1 GM/DL
MCV RBC AUTO: 95.2 FL
NITRITE URINE: NEGATIVE
PH URINE: 5.5
PHOSPHATE SERPL-MCNC: 3.7 MG/DL
PLATELET # BLD AUTO: 333 K/UL
POTASSIUM SERPL-SCNC: 4.7 MMOL/L
PROTEIN URINE: 30 MG/DL
RBC # BLD: 3.34 M/UL
RBC # FLD: 14.8 %
SODIUM SERPL-SCNC: 141 MMOL/L
SPECIFIC GRAVITY URINE: 1.02
TACROLIMUS SERPL-MCNC: 4.7 NG/ML
TIBC SERPL-MCNC: 261 UG/DL
UIBC SERPL-MCNC: 166 UG/DL
UROBILINOGEN URINE: 0.2 MG/DL
VIT B12 SERPL-MCNC: 851 PG/ML
WBC # FLD AUTO: 6.89 K/UL

## 2023-12-02 ENCOUNTER — LABORATORY RESULT (OUTPATIENT)
Age: 68
End: 2023-12-02

## 2023-12-04 ENCOUNTER — APPOINTMENT (OUTPATIENT)
Dept: NEPHROLOGY | Facility: CLINIC | Age: 68
End: 2023-12-04
Payer: MEDICARE

## 2023-12-04 VITALS
HEART RATE: 92 BPM | WEIGHT: 122 LBS | SYSTOLIC BLOOD PRESSURE: 151 MMHG | TEMPERATURE: 96.3 F | OXYGEN SATURATION: 98 % | DIASTOLIC BLOOD PRESSURE: 74 MMHG | BODY MASS INDEX: 22.45 KG/M2 | HEIGHT: 62 IN | RESPIRATION RATE: 15 BRPM

## 2023-12-04 LAB
25(OH)D3 SERPL-MCNC: 23.6 NG/ML
ALBUMIN SERPL ELPH-MCNC: 4.6 G/DL
ALP BLD-CCNC: 100 U/L
ALT SERPL-CCNC: 16 U/L
ANION GAP SERPL CALC-SCNC: 12 MMOL/L
APPEARANCE: CLEAR
AST SERPL-CCNC: 25 U/L
BILIRUB SERPL-MCNC: 0.4 MG/DL
BILIRUBIN URINE: NEGATIVE
BLOOD URINE: NEGATIVE
BUN SERPL-MCNC: 18 MG/DL
CALCIUM SERPL-MCNC: 9.1 MG/DL
CHLORIDE SERPL-SCNC: 104 MMOL/L
CHOLEST SERPL-MCNC: 159 MG/DL
CMV DNA SPEC QL NAA+PROBE: NOT DETECTED IU/ML
CMVPCR LOG: NOT DETECTED LOG10IU/ML
CO2 SERPL-SCNC: 21 MMOL/L
COLOR: YELLOW
CREAT SERPL-MCNC: 1.23 MG/DL
CREAT SPEC-SCNC: 58 MG/DL
EGFR: 64 ML/MIN/1.73M2
ESTIMATED AVERAGE GLUCOSE: 154 MG/DL
GLUCOSE QUALITATIVE U: 100 MG/DL
GLUCOSE SERPL-MCNC: 146 MG/DL
HBA1C MFR BLD HPLC: 7 %
HCT VFR BLD CALC: 32 %
HDLC SERPL-MCNC: 50 MG/DL
HGB BLD-MCNC: 10.7 G/DL
KETONES URINE: NEGATIVE MG/DL
LDLC SERPL CALC-MCNC: 76 MG/DL
LEUKOCYTE ESTERASE URINE: NEGATIVE
MAGNESIUM SERPL-MCNC: 1.8 MG/DL
MCHC RBC-ENTMCNC: 31 PG
MCHC RBC-ENTMCNC: 33.4 GM/DL
MCV RBC AUTO: 92.8 FL
MICROALBUMIN 24H UR DL<=1MG/L-MCNC: 16.9 MG/DL
MICROALBUMIN/CREAT 24H UR-RTO: 290 MG/G
NITRITE URINE: NEGATIVE
NONHDLC SERPL-MCNC: 108 MG/DL
PH URINE: 6
PHOSPHATE SERPL-MCNC: 2.9 MG/DL
PLATELET # BLD AUTO: 323 K/UL
POTASSIUM SERPL-SCNC: 4.1 MMOL/L
PROT SERPL-MCNC: 7 G/DL
PROTEIN URINE: 30 MG/DL
RBC # BLD: 3.45 M/UL
RBC # FLD: 13.9 %
SODIUM SERPL-SCNC: 137 MMOL/L
SPECIFIC GRAVITY URINE: 1.01
TACROLIMUS SERPL-MCNC: 8.4 NG/ML
TRIGL SERPL-MCNC: 195 MG/DL
URATE SERPL-MCNC: 3.9 MG/DL
UROBILINOGEN URINE: 0.2 MG/DL
WBC # FLD AUTO: 4.81 K/UL

## 2023-12-04 PROCEDURE — 99214 OFFICE O/P EST MOD 30 MIN: CPT

## 2023-12-04 RX ORDER — PREDNISONE 5 MG/1
5 TABLET ORAL
Qty: 30 | Refills: 5 | Status: DISCONTINUED | COMMUNITY
Start: 2023-05-26 | End: 2023-12-04

## 2023-12-04 RX ORDER — VALGANCICLOVIR HYDROCHLORIDE 450 MG/1
450 TABLET ORAL
Qty: 90 | Refills: 0 | Status: DISCONTINUED | COMMUNITY
Start: 2023-05-26 | End: 2023-12-04

## 2023-12-04 RX ORDER — ERYTHROPOIETIN 10000 [IU]/ML
10000 INJECTION, SOLUTION INTRAVENOUS; SUBCUTANEOUS
Qty: 4 | Refills: 2 | Status: DISCONTINUED | COMMUNITY
Start: 2023-06-19 | End: 2023-12-04

## 2023-12-05 LAB — BKV DNA SPEC QL NAA+PROBE: NOT DETECTED IU/ML

## 2023-12-11 ENCOUNTER — RX RENEWAL (OUTPATIENT)
Age: 68
End: 2023-12-11

## 2023-12-11 RX ORDER — PEN NEEDLE, DIABETIC 32GX 5/32"
32G X 4 MM NEEDLE, DISPOSABLE MISCELLANEOUS
Qty: 3 | Refills: 3 | Status: ACTIVE | COMMUNITY
Start: 2023-12-11 | End: 1900-01-01

## 2023-12-15 RX ORDER — PREDNISONE 5 MG/1
5 TABLET ORAL
Qty: 90 | Refills: 3 | Status: ACTIVE | COMMUNITY
Start: 2023-05-26 | End: 1900-01-01

## 2024-01-02 ENCOUNTER — RX RENEWAL (OUTPATIENT)
Age: 69
End: 2024-01-02

## 2024-01-06 ENCOUNTER — LABORATORY RESULT (OUTPATIENT)
Age: 69
End: 2024-01-06

## 2024-01-08 ENCOUNTER — APPOINTMENT (OUTPATIENT)
Dept: NEPHROLOGY | Facility: CLINIC | Age: 69
End: 2024-01-08

## 2024-01-08 ENCOUNTER — APPOINTMENT (OUTPATIENT)
Dept: NEPHROLOGY | Facility: CLINIC | Age: 69
End: 2024-01-08
Payer: MEDICARE

## 2024-01-08 VITALS
BODY MASS INDEX: 22.45 KG/M2 | WEIGHT: 122 LBS | SYSTOLIC BLOOD PRESSURE: 139 MMHG | HEIGHT: 62 IN | DIASTOLIC BLOOD PRESSURE: 65 MMHG | HEART RATE: 81 BPM | TEMPERATURE: 98 F | RESPIRATION RATE: 15 BRPM | OXYGEN SATURATION: 99 %

## 2024-01-08 LAB
ALBUMIN SERPL ELPH-MCNC: 4.3 G/DL
ALP BLD-CCNC: 106 U/L
ALT SERPL-CCNC: 12 U/L
ANION GAP SERPL CALC-SCNC: 13 MMOL/L
APPEARANCE: CLEAR
AST SERPL-CCNC: 18 U/L
BILIRUB SERPL-MCNC: 0.4 MG/DL
BILIRUBIN URINE: NEGATIVE
BLOOD URINE: NEGATIVE
BUN SERPL-MCNC: 23 MG/DL
CALCIUM SERPL-MCNC: 9 MG/DL
CHLORIDE SERPL-SCNC: 104 MMOL/L
CMV DNA SPEC QL NAA+PROBE: NOT DETECTED IU/ML
CMVPCR LOG: NOT DETECTED LOG10IU/ML
CO2 SERPL-SCNC: 22 MMOL/L
COLOR: YELLOW
CREAT SERPL-MCNC: 1.36 MG/DL
EGFR: 57 ML/MIN/1.73M2
GLUCOSE QUALITATIVE U: 100 MG/DL
GLUCOSE SERPL-MCNC: 114 MG/DL
HCT VFR BLD CALC: 33.9 %
HGB BLD-MCNC: 10.5 G/DL
KETONES URINE: NEGATIVE MG/DL
LEUKOCYTE ESTERASE URINE: NEGATIVE
MAGNESIUM SERPL-MCNC: 2 MG/DL
MCHC RBC-ENTMCNC: 30.1 PG
MCHC RBC-ENTMCNC: 31 GM/DL
MCV RBC AUTO: 97.1 FL
NITRITE URINE: NEGATIVE
PH URINE: 5.5
PHOSPHATE SERPL-MCNC: 3.7 MG/DL
PLATELET # BLD AUTO: 317 K/UL
POTASSIUM SERPL-SCNC: 5.1 MMOL/L
PROT SERPL-MCNC: 6.7 G/DL
PROTEIN URINE: 30 MG/DL
RBC # BLD: 3.49 M/UL
RBC # FLD: 13.8 %
SODIUM SERPL-SCNC: 139 MMOL/L
SPECIFIC GRAVITY URINE: 1.02
TACROLIMUS SERPL-MCNC: 6.2 NG/ML
UROBILINOGEN URINE: 0.2 MG/DL
WBC # FLD AUTO: 7.18 K/UL

## 2024-01-08 PROCEDURE — 99214 OFFICE O/P EST MOD 30 MIN: CPT

## 2024-01-08 NOTE — HISTORY OF PRESENT ILLNESS
[ Donor] :  donor [Basiliximab] : basiliximab [Steroids] : steroids [Negative/Positive] : Donor Negative/Recipient Positive [] : Yes [Terminal Creatinine: ____] : Terminal Creatinine: [unfilled] [KDPI: ____] : Kidney Donor Profile Index: [unfilled] [TextBox_7] : 5/25/23 [FreeTextEntry4] : 68 year old male with  ESRD on HD, DM, HTN, and anemia s/p kidney transplantation. \par  Donor- KDPI 96%, Terminal Cr 0.8\par  Last IHD was on 5/24/23 [de-identified] : Pt feels well.  Here with his daughter.  Blood sugars are ok, they would like to try an oral hypoglycemic agent instead of insulin.

## 2024-01-08 NOTE — PHYSICAL EXAM
[General Appearance - Alert] : alert [General Appearance - In No Acute Distress] : in no acute distress [General Appearance - Well Nourished] : well nourished [General Appearance - Well Developed] : well developed [Sclera] : the sclera and conjunctiva were normal [Extraocular Movements] : extraocular movements were intact [Strabismus] : no strabismus was seen [Outer Ear] : the ears and nose were normal in appearance [Hearing Threshold Finger Rub Not [place]] : hearing was normal [Nasal Cavity] : the nasal mucosa and septum were normal [Neck Appearance] : the appearance of the neck was normal [Neck Cervical Mass (___cm)] : no neck mass was observed [Respiration, Rhythm And Depth] : normal respiratory rhythm and effort [Exaggerated Use Of Accessory Muscles For Inspiration] : no accessory muscle use [Heart Rate And Rhythm] : heart rate was normal and rhythm regular [Heart Sounds] : normal S1 and S2 [Heart Sounds Gallop] : no gallops [Edema] : there was no peripheral edema [Bowel Sounds] : normal bowel sounds [Abdomen Soft] : soft [Abdomen Tenderness] : non-tender [Cervical Lymph Nodes Enlarged Posterior Bilaterally] : posterior cervical [Cervical Lymph Nodes Enlarged Anterior Bilaterally] : anterior cervical [Supraclavicular Lymph Nodes Enlarged Bilaterally] : supraclavicular [Abnormal Walk] : normal gait [Nail Clubbing] : no clubbing  or cyanosis of the fingernails [Musculoskeletal - Swelling] : no joint swelling seen [Skin Color & Pigmentation] : normal skin color and pigmentation [Skin Turgor] : normal skin turgor [] : no rash [Cranial Nerves] : cranial nerves 2-12 were intact [No Focal Deficits] : no focal deficits [Oriented To Time, Place, And Person] : oriented to person, place, and time [Impaired Insight] : insight and judgment were intact [Affect] : the affect was normal

## 2024-01-08 NOTE — PLAN
[FreeTextEntry1] : 1.s/p DDRT on 5/24/23- Had slow graft function from ATN which has resolved. Creatinine down to 1.2, 1.3 today.    2. IS meds- Simulect Induction.  Dosing tac by level. goal level 6-9.    Cont MMF 500mgs BID and continue pred 5.  3. HTN - pressure reasonably controlled.  4. Anemia - has folate deficiency, improving on Folate.  5. Diabetes 2 - Blood glucose improved.  Will start metformin ER 500mgs and only cover with insulin sliding scale if needed.  6.  Depression - back on prozac.   Pt will return to see Dr. Yoder.   Will return to transplant in 1 month.  When re-establishes care will reduce visits.

## 2024-01-08 NOTE — CONSULT LETTER
[Dear  ___] : Dear  [unfilled], [Courtesy Letter:] : I had the pleasure of seeing your patient, [unfilled], in my office today. [Please see my note below.] : Please see my note below. [Consult Closing:] : Thank you very much for allowing me to participate in the care of this patient.  If you have any questions, please do not hesitate to contact me. [Sincerely,] : Sincerely, [FreeTextEntry3] : Harry Garcia, DO

## 2024-01-09 LAB — BKV DNA SPEC QL NAA+PROBE: NOT DETECTED IU/ML

## 2024-02-23 ENCOUNTER — APPOINTMENT (OUTPATIENT)
Dept: TRANSPLANT | Facility: CLINIC | Age: 69
End: 2024-02-23

## 2024-02-24 ENCOUNTER — LABORATORY RESULT (OUTPATIENT)
Age: 69
End: 2024-02-24

## 2024-02-26 ENCOUNTER — APPOINTMENT (OUTPATIENT)
Dept: NEPHROLOGY | Facility: CLINIC | Age: 69
End: 2024-02-26
Payer: MEDICARE

## 2024-02-26 VITALS
WEIGHT: 128 LBS | TEMPERATURE: 98 F | BODY MASS INDEX: 23.55 KG/M2 | HEART RATE: 81 BPM | OXYGEN SATURATION: 99 % | SYSTOLIC BLOOD PRESSURE: 144 MMHG | RESPIRATION RATE: 15 BRPM | HEIGHT: 62 IN | DIASTOLIC BLOOD PRESSURE: 70 MMHG

## 2024-02-26 LAB
ALBUMIN SERPL ELPH-MCNC: 4.5 G/DL
ALP BLD-CCNC: 107 U/L
ALT SERPL-CCNC: 13 U/L
ANION GAP SERPL CALC-SCNC: 10 MMOL/L
APPEARANCE: CLEAR
AST SERPL-CCNC: 17 U/L
BACTERIA: NEGATIVE /HPF
BASOPHILS # BLD AUTO: 0 K/UL
BASOPHILS NFR BLD AUTO: 0 %
BILIRUB SERPL-MCNC: 0.4 MG/DL
BILIRUBIN URINE: NEGATIVE
BKV DNA SPEC QL NAA+PROBE: NOT DETECTED IU/ML
BLOOD URINE: NEGATIVE
BUN SERPL-MCNC: 27 MG/DL
CALCIUM SERPL-MCNC: 9.4 MG/DL
CALCIUM SERPL-MCNC: 9.4 MG/DL
CAST: 5 /LPF
CHLORIDE SERPL-SCNC: 104 MMOL/L
CMV DNA SPEC QL NAA+PROBE: 36 IU/ML
CMVPCR LOG: 1.55 LOG10IU/ML
CO2 SERPL-SCNC: 26 MMOL/L
COLOR: YELLOW
CREAT SERPL-MCNC: 1.6 MG/DL
CREAT SPEC-SCNC: 69 MG/DL
CREAT/PROT UR: 0.6 RATIO
EGFR: 47 ML/MIN/1.73M2
EOSINOPHIL # BLD AUTO: 0.36 K/UL
EOSINOPHIL NFR BLD AUTO: 5.2 %
EPITHELIAL CELLS: 0 /HPF
GLUCOSE QUALITATIVE U: 100 MG/DL
GLUCOSE SERPL-MCNC: 134 MG/DL
HCT VFR BLD CALC: 34.5 %
HGB BLD-MCNC: 10.9 G/DL
KETONES URINE: NEGATIVE MG/DL
LEUKOCYTE ESTERASE URINE: NEGATIVE
LYMPHOCYTES # BLD AUTO: 1.58 K/UL
LYMPHOCYTES NFR BLD AUTO: 22.6 %
MAGNESIUM SERPL-MCNC: 2 MG/DL
MAN DIFF?: NORMAL
MCHC RBC-ENTMCNC: 29.5 PG
MCHC RBC-ENTMCNC: 31.6 GM/DL
MCV RBC AUTO: 93.2 FL
MICROSCOPIC-UA: NORMAL
MONOCYTES # BLD AUTO: 0.49 K/UL
MONOCYTES NFR BLD AUTO: 7 %
NEUTROPHILS # BLD AUTO: 4.14 K/UL
NEUTROPHILS NFR BLD AUTO: 51.3 %
NITRITE URINE: NEGATIVE
PARATHYROID HORMONE INTACT: 116 PG/ML
PH URINE: 6
PHOSPHATE SERPL-MCNC: 4.2 MG/DL
PLATELET # BLD AUTO: 290 K/UL
POTASSIUM SERPL-SCNC: 5.7 MMOL/L
PROT SERPL-MCNC: 6.7 G/DL
PROT UR-MCNC: 42 MG/DL
PROTEIN URINE: 30 MG/DL
RBC # BLD: 3.7 M/UL
RBC # FLD: 14.2 %
RED BLOOD CELLS URINE: 0 /HPF
REVIEW: NORMAL
SODIUM SERPL-SCNC: 141 MMOL/L
SPECIFIC GRAVITY URINE: 1.02
TACROLIMUS SERPL-MCNC: 6.6 NG/ML
UROBILINOGEN URINE: 0.2 MG/DL
WBC # FLD AUTO: 7 K/UL
WHITE BLOOD CELLS URINE: 0 /HPF

## 2024-02-26 PROCEDURE — 99214 OFFICE O/P EST MOD 30 MIN: CPT

## 2024-02-26 RX ORDER — METFORMIN ER 500 MG 500 MG/1
500 TABLET ORAL
Qty: 60 | Refills: 3 | Status: ACTIVE | COMMUNITY
Start: 2024-01-08 | End: 1900-01-01

## 2024-02-26 RX ORDER — FLASH GLUCOSE SENSOR
KIT MISCELLANEOUS
Qty: 2 | Refills: 11 | Status: ACTIVE | COMMUNITY
Start: 2023-08-07 | End: 1900-01-01

## 2024-02-26 NOTE — PLAN
[FreeTextEntry1] : 1.s/p DDRT on 5/24/23- Had slow graft function from ATN which has resolved. Creatinine down to 1.2, 1.6  today. suspect due to hyperglycemia.  Will hydrate, improve glycemic control and repeat in 1 week.    2. IS meds- Simulect Induction.  Dosing tac by level. goal level 6-9.    Cont MMF 500mgs BID and continue pred 5.  3. HTN - pressure reasonably controlled.  4. Anemia - has folate deficiency, improving on Folate.  5. Diabetes 2 - Blood glucose elevated.  REsume metformin 500mgs BID.   6.  Depression - back on prozac.   Pt will return to see Dr. Yoder.   Will return to transplant in 1 month.  When re-establishes care will reduce visits.

## 2024-02-26 NOTE — HISTORY OF PRESENT ILLNESS
[ Donor] :  donor [Basiliximab] : basiliximab [Steroids] : steroids [Negative/Positive] : Donor Negative/Recipient Positive [] : Yes [Terminal Creatinine: ____] : Terminal Creatinine: [unfilled] [KDPI: ____] : Kidney Donor Profile Index: [unfilled] [FreeTextEntry4] : 68 year old male with  ESRD on HD, DM, HTN, and anemia s/p kidney transplantation. \par  Donor- KDPI 96%, Terminal Cr 0.8\par  Last IHD was on 5/24/23 [TextBox_7] : 5/25/23 [de-identified] : Pt feels well.  Here with his daughter.  Ran out of metformin.  Blood sugars high in the afternoon.  Creatinine up to 1.6.

## 2024-02-26 NOTE — PHYSICAL EXAM
[General Appearance - Alert] : alert [General Appearance - In No Acute Distress] : in no acute distress [General Appearance - Well Nourished] : well nourished [General Appearance - Well Developed] : well developed [Extraocular Movements] : extraocular movements were intact [Sclera] : the sclera and conjunctiva were normal [Strabismus] : no strabismus was seen [Outer Ear] : the ears and nose were normal in appearance [Hearing Threshold Finger Rub Not [place]] : hearing was normal [Nasal Cavity] : the nasal mucosa and septum were normal [Neck Appearance] : the appearance of the neck was normal [Neck Cervical Mass (___cm)] : no neck mass was observed [Respiration, Rhythm And Depth] : normal respiratory rhythm and effort [Exaggerated Use Of Accessory Muscles For Inspiration] : no accessory muscle use [Heart Rate And Rhythm] : heart rate was normal and rhythm regular [Heart Sounds] : normal S1 and S2 [Heart Sounds Gallop] : no gallops [Bowel Sounds] : normal bowel sounds [Edema] : there was no peripheral edema [Abdomen Soft] : soft [Abdomen Tenderness] : non-tender [Cervical Lymph Nodes Enlarged Anterior Bilaterally] : anterior cervical [Cervical Lymph Nodes Enlarged Posterior Bilaterally] : posterior cervical [Supraclavicular Lymph Nodes Enlarged Bilaterally] : supraclavicular [Abnormal Walk] : normal gait [Nail Clubbing] : no clubbing  or cyanosis of the fingernails [Musculoskeletal - Swelling] : no joint swelling seen [Skin Color & Pigmentation] : normal skin color and pigmentation [Skin Turgor] : normal skin turgor [] : no rash [Cranial Nerves] : cranial nerves 2-12 were intact [No Focal Deficits] : no focal deficits [Oriented To Time, Place, And Person] : oriented to person, place, and time [Impaired Insight] : insight and judgment were intact [Affect] : the affect was normal
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
## 2024-03-08 ENCOUNTER — APPOINTMENT (OUTPATIENT)
Dept: TRANSPLANT | Facility: CLINIC | Age: 69
End: 2024-03-08

## 2024-03-09 ENCOUNTER — LABORATORY RESULT (OUTPATIENT)
Age: 69
End: 2024-03-09

## 2024-03-11 ENCOUNTER — NON-APPOINTMENT (OUTPATIENT)
Age: 69
End: 2024-03-11

## 2024-03-11 LAB
ALBUMIN SERPL ELPH-MCNC: 4.6 G/DL
ALP BLD-CCNC: 126 U/L
ALT SERPL-CCNC: 17 U/L
ANION GAP SERPL CALC-SCNC: 16 MMOL/L
APPEARANCE: CLEAR
AST SERPL-CCNC: 22 U/L
BACTERIA: NEGATIVE /HPF
BASOPHILS # BLD AUTO: 0 K/UL
BASOPHILS NFR BLD AUTO: 0 %
BILIRUB SERPL-MCNC: 0.3 MG/DL
BILIRUBIN URINE: NEGATIVE
BKV DNA SPEC QL NAA+PROBE: NOT DETECTED IU/ML
BLOOD URINE: NEGATIVE
BUN SERPL-MCNC: 26 MG/DL
CALCIUM SERPL-MCNC: 9.7 MG/DL
CALCIUM SERPL-MCNC: 9.7 MG/DL
CAST: 0 /LPF
CHLORIDE SERPL-SCNC: 106 MMOL/L
CMV DNA SPEC QL NAA+PROBE: ABNORMAL IU/ML
CMVPCR LOG: ABNORMAL LOG10IU/ML
CO2 SERPL-SCNC: 22 MMOL/L
COLOR: YELLOW
CREAT SERPL-MCNC: 1.42 MG/DL
CREAT SPEC-SCNC: 85 MG/DL
CREAT/PROT UR: 0.5 RATIO
EGFR: 54 ML/MIN/1.73M2
EOSINOPHIL # BLD AUTO: 0.2 K/UL
EOSINOPHIL NFR BLD AUTO: 2.7 %
EPITHELIAL CELLS: 0 /HPF
GLUCOSE QUALITATIVE U: 250 MG/DL
GLUCOSE SERPL-MCNC: 132 MG/DL
HCT VFR BLD CALC: 34 %
HGB BLD-MCNC: 10.9 G/DL
KETONES URINE: NEGATIVE MG/DL
LEUKOCYTE ESTERASE URINE: NEGATIVE
LYMPHOCYTES # BLD AUTO: 1.81 K/UL
LYMPHOCYTES NFR BLD AUTO: 25 %
MAGNESIUM SERPL-MCNC: 1.6 MG/DL
MAN DIFF?: NORMAL
MCHC RBC-ENTMCNC: 29.6 PG
MCHC RBC-ENTMCNC: 32.1 GM/DL
MCV RBC AUTO: 92.4 FL
MICROSCOPIC-UA: NORMAL
MONOCYTES # BLD AUTO: 0.51 K/UL
MONOCYTES NFR BLD AUTO: 7.1 %
NEUTROPHILS # BLD AUTO: 4.52 K/UL
NEUTROPHILS NFR BLD AUTO: 62.5 %
NITRITE URINE: NEGATIVE
PARATHYROID HORMONE INTACT: 74 PG/ML
PH URINE: 6
PHOSPHATE SERPL-MCNC: 3.5 MG/DL
PLATELET # BLD AUTO: 301 K/UL
POTASSIUM SERPL-SCNC: 5.1 MMOL/L
PROT SERPL-MCNC: 7 G/DL
PROT UR-MCNC: 39 MG/DL
PROTEIN URINE: 30 MG/DL
RBC # BLD: 3.68 M/UL
RBC # FLD: 14.3 %
RED BLOOD CELLS URINE: 0 /HPF
SODIUM SERPL-SCNC: 143 MMOL/L
SPECIFIC GRAVITY URINE: 1.02
TACROLIMUS SERPL-MCNC: 7.6 NG/ML
UROBILINOGEN URINE: 0.2 MG/DL
WBC # FLD AUTO: 7.23 K/UL
WHITE BLOOD CELLS URINE: 0 /HPF

## 2024-03-12 ENCOUNTER — RX RENEWAL (OUTPATIENT)
Age: 69
End: 2024-03-12

## 2024-03-12 RX ORDER — FOLIC ACID 1 MG/1
1 TABLET ORAL DAILY
Qty: 30 | Refills: 3 | Status: ACTIVE | COMMUNITY
Start: 2023-06-12 | End: 1900-01-01

## 2024-03-28 ENCOUNTER — APPOINTMENT (OUTPATIENT)
Dept: NEPHROLOGY | Facility: CLINIC | Age: 69
End: 2024-03-28
Payer: MEDICARE

## 2024-03-28 ENCOUNTER — NON-APPOINTMENT (OUTPATIENT)
Age: 69
End: 2024-03-28

## 2024-03-28 VITALS
HEIGHT: 62 IN | TEMPERATURE: 98 F | OXYGEN SATURATION: 97 % | BODY MASS INDEX: 23.92 KG/M2 | RESPIRATION RATE: 16 BRPM | DIASTOLIC BLOOD PRESSURE: 75 MMHG | SYSTOLIC BLOOD PRESSURE: 161 MMHG | WEIGHT: 130 LBS | HEART RATE: 85 BPM

## 2024-03-28 LAB
25(OH)D3 SERPL-MCNC: 26.2 NG/ML
ALBUMIN SERPL ELPH-MCNC: 4.7 G/DL
ALP BLD-CCNC: 123 U/L
ALT SERPL-CCNC: 7 U/L
ANION GAP SERPL CALC-SCNC: 10 MMOL/L
AST SERPL-CCNC: 13 U/L
BASOPHILS # BLD AUTO: 0.03 K/UL
BASOPHILS NFR BLD AUTO: 0.4 %
BILIRUB SERPL-MCNC: 0.3 MG/DL
BUN SERPL-MCNC: 26 MG/DL
CALCIUM SERPL-MCNC: 9.4 MG/DL
CALCIUM SERPL-MCNC: 9.4 MG/DL
CHLORIDE SERPL-SCNC: 104 MMOL/L
CHOLEST SERPL-MCNC: 179 MG/DL
CO2 SERPL-SCNC: 26 MMOL/L
CREAT SERPL-MCNC: 1.43 MG/DL
CREAT SPEC-SCNC: 115 MG/DL
CREAT/PROT UR: 0.3 RATIO
EGFR: 53 ML/MIN/1.73M2
EOSINOPHIL # BLD AUTO: 0.19 K/UL
EOSINOPHIL NFR BLD AUTO: 2.7 %
ESTIMATED AVERAGE GLUCOSE: 148 MG/DL
GLUCOSE SERPL-MCNC: 155 MG/DL
HBA1C MFR BLD HPLC: 6.8 %
HCT VFR BLD CALC: 33.5 %
HDLC SERPL-MCNC: 60 MG/DL
HGB BLD-MCNC: 10.9 G/DL
IMM GRANULOCYTES NFR BLD AUTO: 2.7 %
LDLC SERPL CALC-MCNC: 102 MG/DL
LYMPHOCYTES # BLD AUTO: 1.62 K/UL
LYMPHOCYTES NFR BLD AUTO: 23 %
MAGNESIUM SERPL-MCNC: 1.9 MG/DL
MAN DIFF?: NORMAL
MCHC RBC-ENTMCNC: 29.9 PG
MCHC RBC-ENTMCNC: 32.5 GM/DL
MCV RBC AUTO: 91.8 FL
MONOCYTES # BLD AUTO: 0.75 K/UL
MONOCYTES NFR BLD AUTO: 10.7 %
NEUTROPHILS # BLD AUTO: 4.26 K/UL
NEUTROPHILS NFR BLD AUTO: 60.5 %
NONHDLC SERPL-MCNC: 119 MG/DL
PARATHYROID HORMONE INTACT: 90 PG/ML
PHOSPHATE SERPL-MCNC: 3.8 MG/DL
PLATELET # BLD AUTO: 270 K/UL
POTASSIUM SERPL-SCNC: 5.3 MMOL/L
PROT SERPL-MCNC: 6.9 G/DL
PROT UR-MCNC: 36 MG/DL
RBC # BLD: 3.65 M/UL
RBC # FLD: 14.5 %
SODIUM SERPL-SCNC: 140 MMOL/L
TACROLIMUS SERPL-MCNC: 10.5 NG/ML
TRIGL SERPL-MCNC: 90 MG/DL
WBC # FLD AUTO: 7.04 K/UL

## 2024-03-28 PROCEDURE — 99214 OFFICE O/P EST MOD 30 MIN: CPT

## 2024-03-28 NOTE — PHYSICAL EXAM
[General Appearance - Alert] : alert [General Appearance - In No Acute Distress] : in no acute distress [General Appearance - Well Nourished] : well nourished [General Appearance - Well Developed] : well developed [Sclera] : the sclera and conjunctiva were normal [Extraocular Movements] : extraocular movements were intact [Strabismus] : no strabismus was seen [Outer Ear] : the ears and nose were normal in appearance [Hearing Threshold Finger Rub Not [place]] : hearing was normal [Nasal Cavity] : the nasal mucosa and septum were normal [Neck Appearance] : the appearance of the neck was normal [Neck Cervical Mass (___cm)] : no neck mass was observed [Respiration, Rhythm And Depth] : normal respiratory rhythm and effort [Heart Rate And Rhythm] : heart rate was normal and rhythm regular [Exaggerated Use Of Accessory Muscles For Inspiration] : no accessory muscle use [Heart Sounds Gallop] : no gallops [Heart Sounds] : normal S1 and S2 [Edema] : there was no peripheral edema [Bowel Sounds] : normal bowel sounds [Abdomen Soft] : soft [Abdomen Tenderness] : non-tender [Cervical Lymph Nodes Enlarged Anterior Bilaterally] : anterior cervical [Cervical Lymph Nodes Enlarged Posterior Bilaterally] : posterior cervical [Supraclavicular Lymph Nodes Enlarged Bilaterally] : supraclavicular [Abnormal Walk] : normal gait [Nail Clubbing] : no clubbing  or cyanosis of the fingernails [Musculoskeletal - Swelling] : no joint swelling seen [Skin Color & Pigmentation] : normal skin color and pigmentation [] : no rash [Skin Turgor] : normal skin turgor [Cranial Nerves] : cranial nerves 2-12 were intact [No Focal Deficits] : no focal deficits [Oriented To Time, Place, And Person] : oriented to person, place, and time [Impaired Insight] : insight and judgment were intact [Affect] : the affect was normal

## 2024-03-28 NOTE — HISTORY OF PRESENT ILLNESS
[ Donor] :  donor [Basiliximab] : basiliximab [Steroids] : steroids [Negative/Positive] : Donor Negative/Recipient Positive [] : Yes [Terminal Creatinine: ____] : Terminal Creatinine: [unfilled] [KDPI: ____] : Kidney Donor Profile Index: [unfilled] [TextBox_7] : 5/25/23 [FreeTextEntry4] : 68 year old male with  ESRD on HD, DM, HTN, and anemia s/p kidney transplantation. \par  Donor- KDPI 96%, Terminal Cr 0.8\par  Last IHD was on 5/24/23 [de-identified] : Pt feels well.  Here with his daughter.  Ran out of metformin.  Blood sugars high in the afternoon.  Creatinine improved to 1.4.

## 2024-03-28 NOTE — PLAN
[FreeTextEntry1] : 1.s/p DDRT on 5/24/23- Had slow graft function from ATN which has resolved. Creatinine down to 1.2, 1.5  today. suspect due to hyperglycemia.  Will hydrate, improve glycemic control and repeat in 1 week.    2. IS meds- Simulect Induction.  Dosing tac by level. goal level 6-9.    Cont MMF 500mgs BID and continue pred 5.  3. HTN - pressure reasonably controlled.   4. Anemia - stable 5. Diabetes 2 - Blood glucose improved on Metformin.   6.  Depression - back on prozac.   Pt will return to see Dr. Yoder.   Will return to transplant in June.

## 2024-03-28 NOTE — CONSULT LETTER
[Courtesy Letter:] : I had the pleasure of seeing your patient, [unfilled], in my office today. [Dear  ___] : Dear  [unfilled], [Consult Closing:] : Thank you very much for allowing me to participate in the care of this patient.  If you have any questions, please do not hesitate to contact me. [Please see my note below.] : Please see my note below. [Sincerely,] : Sincerely, [FreeTextEntry3] : Harry Garcia, DO

## 2024-03-29 ENCOUNTER — NON-APPOINTMENT (OUTPATIENT)
Age: 69
End: 2024-03-29

## 2024-03-29 LAB
APPEARANCE: CLEAR
BACTERIA: NEGATIVE /HPF
BILIRUBIN URINE: NEGATIVE
BLOOD URINE: NEGATIVE
CAST: 4 /LPF
CMV DNA SPEC QL NAA+PROBE: NOT DETECTED IU/ML
CMVPCR LOG: NOT DETECTED LOG10IU/ML
COLOR: YELLOW
EPITHELIAL CELLS: 0 /HPF
GLUCOSE QUALITATIVE U: 250 MG/DL
KETONES URINE: NEGATIVE MG/DL
LEUKOCYTE ESTERASE URINE: NEGATIVE
MICROSCOPIC-UA: NORMAL
NITRITE URINE: NEGATIVE
PH URINE: 5.5
PROTEIN URINE: 30 MG/DL
RED BLOOD CELLS URINE: 0 /HPF
SPECIFIC GRAVITY URINE: 1.02
UROBILINOGEN URINE: 0.2 MG/DL
WHITE BLOOD CELLS URINE: 0 /HPF

## 2024-04-01 LAB — BKV DNA SPEC QL NAA+PROBE: NOT DETECTED IU/ML

## 2024-04-09 ENCOUNTER — RX RENEWAL (OUTPATIENT)
Age: 69
End: 2024-04-09

## 2024-04-09 RX ORDER — NIFEDIPINE 60 MG/1
60 TABLET, EXTENDED RELEASE ORAL
Qty: 60 | Refills: 3 | Status: ACTIVE | COMMUNITY
Start: 2023-05-26 | End: 1900-01-01

## 2024-04-11 ENCOUNTER — APPOINTMENT (OUTPATIENT)
Dept: TRANSPLANT | Facility: CLINIC | Age: 69
End: 2024-04-11

## 2024-04-11 LAB
ALBUMIN SERPL ELPH-MCNC: 4.7 G/DL
ANION GAP SERPL CALC-SCNC: 19 MMOL/L
APPEARANCE: CLEAR
BACTERIA: NEGATIVE /HPF
BASOPHILS # BLD AUTO: 0.05 K/UL
BASOPHILS NFR BLD AUTO: 0.6 %
BILIRUBIN URINE: NEGATIVE
BLOOD URINE: NEGATIVE
BUN SERPL-MCNC: 26 MG/DL
CALCIUM SERPL-MCNC: 9.4 MG/DL
CAST: 0 /LPF
CHLORIDE SERPL-SCNC: 102 MMOL/L
CO2 SERPL-SCNC: 20 MMOL/L
COLOR: YELLOW
CREAT SERPL-MCNC: 1.56 MG/DL
EGFR: 48 ML/MIN/1.73M2
EOSINOPHIL # BLD AUTO: 0.25 K/UL
EOSINOPHIL NFR BLD AUTO: 3.1 %
EPITHELIAL CELLS: 0 /HPF
GLUCOSE QUALITATIVE U: 100 MG/DL
GLUCOSE SERPL-MCNC: 123 MG/DL
HCT VFR BLD CALC: 33.3 %
HGB BLD-MCNC: 10.9 G/DL
IMM GRANULOCYTES NFR BLD AUTO: 1.1 %
KETONES URINE: NEGATIVE MG/DL
LEUKOCYTE ESTERASE URINE: NEGATIVE
LYMPHOCYTES # BLD AUTO: 1.82 K/UL
LYMPHOCYTES NFR BLD AUTO: 22.6 %
MAN DIFF?: NORMAL
MCHC RBC-ENTMCNC: 29.6 PG
MCHC RBC-ENTMCNC: 32.7 GM/DL
MCV RBC AUTO: 90.5 FL
MICROSCOPIC-UA: NORMAL
MONOCYTES # BLD AUTO: 0.77 K/UL
MONOCYTES NFR BLD AUTO: 9.6 %
NEUTROPHILS # BLD AUTO: 5.08 K/UL
NEUTROPHILS NFR BLD AUTO: 63 %
NITRITE URINE: NEGATIVE
PH URINE: 6
PHOSPHATE SERPL-MCNC: 3.5 MG/DL
PLATELET # BLD AUTO: 341 K/UL
POTASSIUM SERPL-SCNC: 5.5 MMOL/L
PROTEIN URINE: 30 MG/DL
RBC # BLD: 3.68 M/UL
RBC # FLD: 14.1 %
RED BLOOD CELLS URINE: 1 /HPF
SODIUM SERPL-SCNC: 140 MMOL/L
SPECIFIC GRAVITY URINE: 1.02
TACROLIMUS SERPL-MCNC: 7.4 NG/ML
UROBILINOGEN URINE: 0.2 MG/DL
WBC # FLD AUTO: 8.06 K/UL
WHITE BLOOD CELLS URINE: 0 /HPF

## 2024-04-11 RX ORDER — SODIUM ZIRCONIUM CYCLOSILICATE 10 G/10G
10 POWDER, FOR SUSPENSION ORAL
Refills: 0 | Status: ACTIVE | COMMUNITY
Start: 2024-04-11

## 2024-04-11 RX ORDER — TACROLIMUS 1 MG/1
1 TABLET, EXTENDED RELEASE ORAL
Qty: 30 | Refills: 11 | Status: DISCONTINUED | COMMUNITY
Start: 2023-10-30 | End: 2024-04-11

## 2024-04-11 RX ORDER — SODIUM ZIRCONIUM CYCLOSILICATE 10 G/10G
10 POWDER, FOR SUSPENSION ORAL
Qty: 1 | Refills: 11 | Status: ACTIVE | COMMUNITY
Start: 2024-02-26 | End: 1900-01-01

## 2024-04-25 ENCOUNTER — APPOINTMENT (OUTPATIENT)
Dept: TRANSPLANT | Facility: CLINIC | Age: 69
End: 2024-04-25

## 2024-04-30 LAB
ALBUMIN SERPL ELPH-MCNC: 4.6 G/DL
ANION GAP SERPL CALC-SCNC: 12 MMOL/L
APPEARANCE: CLEAR
BACTERIA: NEGATIVE /HPF
BASOPHILS # BLD AUTO: 0.03 K/UL
BASOPHILS NFR BLD AUTO: 0.4 %
BILIRUBIN URINE: NEGATIVE
BLOOD URINE: NEGATIVE
BUN SERPL-MCNC: 19 MG/DL
CALCIUM SERPL-MCNC: 9.1 MG/DL
CAST: 0 /LPF
CHLORIDE SERPL-SCNC: 99 MMOL/L
CO2 SERPL-SCNC: 27 MMOL/L
COLOR: YELLOW
CREAT SERPL-MCNC: 1.25 MG/DL
EGFR: 63 ML/MIN/1.73M2
EOSINOPHIL # BLD AUTO: 0.2 K/UL
EOSINOPHIL NFR BLD AUTO: 2.5 %
EPITHELIAL CELLS: 0 /HPF
GLUCOSE QUALITATIVE U: 100 MG/DL
GLUCOSE SERPL-MCNC: 116 MG/DL
HCT VFR BLD CALC: 31.9 %
HGB BLD-MCNC: 10.4 G/DL
IMM GRANULOCYTES NFR BLD AUTO: 0.4 %
KETONES URINE: NEGATIVE MG/DL
LEUKOCYTE ESTERASE URINE: NEGATIVE
LYMPHOCYTES # BLD AUTO: 1.81 K/UL
LYMPHOCYTES NFR BLD AUTO: 22.3 %
MAN DIFF?: NORMAL
MCHC RBC-ENTMCNC: 30.1 PG
MCHC RBC-ENTMCNC: 32.6 GM/DL
MCV RBC AUTO: 92.2 FL
MICROSCOPIC-UA: NORMAL
MONOCYTES # BLD AUTO: 0.83 K/UL
MONOCYTES NFR BLD AUTO: 10.2 %
NEUTROPHILS # BLD AUTO: 5.23 K/UL
NEUTROPHILS NFR BLD AUTO: 64.2 %
NITRITE URINE: NEGATIVE
PH URINE: 6
PHOSPHATE SERPL-MCNC: 3.5 MG/DL
PLATELET # BLD AUTO: 319 K/UL
POTASSIUM SERPL-SCNC: 4.5 MMOL/L
PROTEIN URINE: 30 MG/DL
RBC # BLD: 3.46 M/UL
RBC # FLD: 14.4 %
RED BLOOD CELLS URINE: 1 /HPF
REVIEW: NORMAL
SODIUM SERPL-SCNC: 138 MMOL/L
SPECIFIC GRAVITY URINE: 1.02
SPERM-LIKE CELLS: PRESENT
TACROLIMUS SERPL-MCNC: 3.5 NG/ML
UROBILINOGEN URINE: 0.2 MG/DL
WBC # FLD AUTO: 8.13 K/UL
WHITE BLOOD CELLS URINE: 1 /HPF

## 2024-06-20 ENCOUNTER — APPOINTMENT (OUTPATIENT)
Dept: TRANSPLANT | Facility: CLINIC | Age: 69
End: 2024-06-20

## 2024-06-21 ENCOUNTER — LABORATORY RESULT (OUTPATIENT)
Age: 69
End: 2024-06-21

## 2024-06-24 ENCOUNTER — APPOINTMENT (OUTPATIENT)
Dept: NEPHROLOGY | Facility: CLINIC | Age: 69
End: 2024-06-24

## 2024-06-24 ENCOUNTER — APPOINTMENT (OUTPATIENT)
Dept: TRANSPLANT | Facility: CLINIC | Age: 69
End: 2024-06-24
Payer: MEDICARE

## 2024-06-24 VITALS
HEIGHT: 62 IN | DIASTOLIC BLOOD PRESSURE: 73 MMHG | TEMPERATURE: 98 F | WEIGHT: 134 LBS | BODY MASS INDEX: 24.66 KG/M2 | OXYGEN SATURATION: 98 % | SYSTOLIC BLOOD PRESSURE: 156 MMHG | HEART RATE: 73 BPM | RESPIRATION RATE: 15 BRPM

## 2024-06-24 DIAGNOSIS — Z94.0 KIDNEY TRANSPLANT STATUS: ICD-10-CM

## 2024-06-24 DIAGNOSIS — E11.9 TYPE 2 DIABETES MELLITUS W/OUT COMPLICATIONS: ICD-10-CM

## 2024-06-24 DIAGNOSIS — I10 ESSENTIAL (PRIMARY) HYPERTENSION: ICD-10-CM

## 2024-06-24 DIAGNOSIS — Z79.899 OTHER LONG TERM (CURRENT) DRUG THERAPY: ICD-10-CM

## 2024-06-24 LAB
ALBUMIN SERPL ELPH-MCNC: 4.6 G/DL
ALBUMIN SERPL ELPH-MCNC: 4.7 G/DL
ALP BLD-CCNC: 93 U/L
ALT SERPL-CCNC: 13 U/L
ANION GAP SERPL CALC-SCNC: 12 MMOL/L
ANION GAP SERPL CALC-SCNC: 14 MMOL/L
APPEARANCE: CLEAR
AST SERPL-CCNC: 21 U/L
BACTERIA: NEGATIVE /HPF
BASOPHILS # BLD AUTO: 0 K/UL
BASOPHILS NFR BLD AUTO: 0 %
BILIRUB SERPL-MCNC: 0.3 MG/DL
BILIRUBIN URINE: NEGATIVE
BKV DNA SPEC QL NAA+PROBE: NOT DETECTED IU/ML
BLOOD URINE: NEGATIVE
BUN SERPL-MCNC: 20 MG/DL
BUN SERPL-MCNC: 20 MG/DL
CALCIUM SERPL-MCNC: 9.7 MG/DL
CAST: 1 /LPF
CHLORIDE SERPL-SCNC: 102 MMOL/L
CHLORIDE SERPL-SCNC: 103 MMOL/L
CMV DNA SPEC QL NAA+PROBE: NOT DETECTED IU/ML
CMVPCR LOG: NOT DETECTED LOG10IU/ML
CO2 SERPL-SCNC: 24 MMOL/L
CO2 SERPL-SCNC: 25 MMOL/L
COLOR: YELLOW
CREAT SERPL-MCNC: 1.49 MG/DL
CREAT SERPL-MCNC: 1.5 MG/DL
CREAT SPEC-SCNC: 132 MG/DL
CREAT/PROT UR: 0.4 RATIO
EGFR: 50 ML/MIN/1.73M2
EGFR: 51 ML/MIN/1.73M2
EOSINOPHIL # BLD AUTO: 0.17 K/UL
EOSINOPHIL NFR BLD AUTO: 3.4 %
EPITHELIAL CELLS: 1 /HPF
GLUCOSE QUALITATIVE U: 100 MG/DL
GLUCOSE SERPL-MCNC: 162 MG/DL
GLUCOSE SERPL-MCNC: 165 MG/DL
HCT VFR BLD CALC: 34.8 %
HGB BLD-MCNC: 10.9 G/DL
KETONES URINE: NEGATIVE MG/DL
LEUKOCYTE ESTERASE URINE: NEGATIVE
LYMPHOCYTES # BLD AUTO: 1.54 K/UL
LYMPHOCYTES NFR BLD AUTO: 30.8 %
MAGNESIUM SERPL-MCNC: 1.9 MG/DL
MAN DIFF?: NORMAL
MCHC RBC-ENTMCNC: 29.8 PG
MCHC RBC-ENTMCNC: 31.3 GM/DL
MCV RBC AUTO: 95.1 FL
MICROSCOPIC-UA: NORMAL
MONOCYTES # BLD AUTO: 0.47 K/UL
MONOCYTES NFR BLD AUTO: 9.4 %
NEUTROPHILS # BLD AUTO: 2.82 K/UL
NEUTROPHILS NFR BLD AUTO: 56.4 %
NITRITE URINE: NEGATIVE
PARATHYROID HORMONE INTACT: 104 PG/ML
PH URINE: 5.5
PHOSPHATE SERPL-MCNC: 3.5 MG/DL
PHOSPHATE SERPL-MCNC: 3.6 MG/DL
PLATELET # BLD AUTO: 292 K/UL
POTASSIUM SERPL-SCNC: 5.1 MMOL/L
POTASSIUM SERPL-SCNC: 5.4 MMOL/L
PROT SERPL-MCNC: 7.1 G/DL
PROT UR-MCNC: 56 MG/DL
PROTEIN URINE: 30 MG/DL
RBC # BLD: 3.66 M/UL
RBC # FLD: 14.3 %
RED BLOOD CELLS URINE: 1 /HPF
REVIEW: NORMAL
SODIUM SERPL-SCNC: 139 MMOL/L
SODIUM SERPL-SCNC: 141 MMOL/L
SPECIFIC GRAVITY URINE: 1.02
SPERM-LIKE CELLS: PRESENT
TACROLIMUS SERPL-MCNC: 9 NG/ML
UROBILINOGEN URINE: 0.2 MG/DL
WBC # FLD AUTO: 5 K/UL
WHITE BLOOD CELLS URINE: 0 /HPF

## 2024-06-24 PROCEDURE — 99024 POSTOP FOLLOW-UP VISIT: CPT

## 2024-06-24 RX ORDER — TACROLIMUS 0.75 MG/1
0.75 TABLET, EXTENDED RELEASE ORAL DAILY
Qty: 60 | Refills: 11 | Status: DISCONTINUED | COMMUNITY
Start: 2024-04-29 | End: 2024-06-24

## 2024-06-24 RX ORDER — TACROLIMUS 4 MG/1
4 TABLET, EXTENDED RELEASE ORAL
Qty: 30 | Refills: 11 | Status: ACTIVE | COMMUNITY
Start: 2023-05-26

## 2024-06-25 LAB
MEV IGG FLD QL IA: 228 AU/ML
MEV IGG+IGM SER-IMP: POSITIVE
MUV AB SER-ACNC: POSITIVE
MUV IGG SER QL IA: 198 AU/ML
RUBV IGG FLD-ACNC: 19.7 INDEX
RUBV IGG SER-IMP: POSITIVE
STRONGYLOIDES AB SER IA-ACNC: NEGATIVE

## 2024-06-27 PROBLEM — Z94.0 KIDNEY TRANSPLANT RECIPIENT: Status: ACTIVE | Noted: 2023-05-26

## 2024-06-27 PROBLEM — Z79.899 LONG TERM CURRENT USE OF IMMUNOSUPPRESSIVE DRUG: Status: ACTIVE | Noted: 2023-06-12

## 2024-06-27 PROBLEM — I10 HTN (HYPERTENSION), BENIGN: Status: ACTIVE | Noted: 2023-07-10

## 2024-07-08 ENCOUNTER — APPOINTMENT (OUTPATIENT)
Dept: NEPHROLOGY | Facility: CLINIC | Age: 69
End: 2024-07-08

## 2024-07-15 ENCOUNTER — RX RENEWAL (OUTPATIENT)
Age: 69
End: 2024-07-15

## 2024-07-18 DIAGNOSIS — Z94.0 KIDNEY TRANSPLANT STATUS: ICD-10-CM

## 2024-07-22 ENCOUNTER — RX RENEWAL (OUTPATIENT)
Age: 69
End: 2024-07-22

## 2024-07-29 ENCOUNTER — APPOINTMENT (OUTPATIENT)
Dept: TRANSPLANT | Facility: CLINIC | Age: 69
End: 2024-07-29

## 2024-07-30 LAB
ALBUMIN SERPL ELPH-MCNC: 4.7 G/DL
ANION GAP SERPL CALC-SCNC: 13 MMOL/L
APPEARANCE: CLEAR
BACTERIA: NEGATIVE /HPF
BASOPHILS # BLD AUTO: 0.02 K/UL
BASOPHILS NFR BLD AUTO: 0.2 %
BILIRUBIN URINE: NEGATIVE
BLOOD URINE: NEGATIVE
BUN SERPL-MCNC: 20 MG/DL
CALCIUM SERPL-MCNC: 9.7 MG/DL
CAST: 0 /LPF
CHLORIDE SERPL-SCNC: 102 MMOL/L
CO2 SERPL-SCNC: 25 MMOL/L
COLOR: YELLOW
CREAT SERPL-MCNC: 1.42 MG/DL
EGFR: 54 ML/MIN/1.73M2
EOSINOPHIL # BLD AUTO: 0.22 K/UL
EOSINOPHIL NFR BLD AUTO: 2.6 %
EPITHELIAL CELLS: 0 /HPF
GLUCOSE QUALITATIVE U: 250 MG/DL
GLUCOSE SERPL-MCNC: 114 MG/DL
HCT VFR BLD CALC: 35.1 %
HGB BLD-MCNC: 11.2 G/DL
IMM GRANULOCYTES NFR BLD AUTO: 0.5 %
KETONES URINE: NEGATIVE MG/DL
LEUKOCYTE ESTERASE URINE: NEGATIVE
LYMPHOCYTES # BLD AUTO: 1.83 K/UL
LYMPHOCYTES NFR BLD AUTO: 21.3 %
MAN DIFF?: NORMAL
MCHC RBC-ENTMCNC: 29.6 PG
MCHC RBC-ENTMCNC: 31.9 GM/DL
MCV RBC AUTO: 92.6 FL
MICROSCOPIC-UA: NORMAL
MONOCYTES # BLD AUTO: 0.91 K/UL
MONOCYTES NFR BLD AUTO: 10.6 %
NEUTROPHILS # BLD AUTO: 5.56 K/UL
NEUTROPHILS NFR BLD AUTO: 64.8 %
NITRITE URINE: NEGATIVE
PH URINE: 6
PHOSPHATE SERPL-MCNC: 3.5 MG/DL
PLATELET # BLD AUTO: 334 K/UL
POTASSIUM SERPL-SCNC: 4.9 MMOL/L
PROTEIN URINE: 30 MG/DL
RBC # BLD: 3.79 M/UL
RBC # FLD: 13.9 %
RED BLOOD CELLS URINE: 1 /HPF
SODIUM SERPL-SCNC: 141 MMOL/L
SPECIFIC GRAVITY URINE: 1.02
TACROLIMUS SERPL-MCNC: 2.7 NG/ML
UROBILINOGEN URINE: 0.2 MG/DL
WBC # FLD AUTO: 8.58 K/UL
WHITE BLOOD CELLS URINE: 0 /HPF

## 2024-07-30 RX ORDER — CARVEDILOL 3.12 MG/1
3.12 TABLET, FILM COATED ORAL TWICE DAILY
Qty: 60 | Refills: 11 | Status: ACTIVE | COMMUNITY
Start: 2024-07-30 | End: 1900-01-01

## 2024-08-01 RX ORDER — TACROLIMUS 1 MG/1
1 TABLET, EXTENDED RELEASE ORAL DAILY
Qty: 60 | Refills: 11 | Status: ACTIVE | COMMUNITY
Start: 2024-07-30 | End: 1900-01-01

## 2024-08-02 ENCOUNTER — APPOINTMENT (OUTPATIENT)
Dept: TRANSPLANT | Facility: CLINIC | Age: 69
End: 2024-08-02

## 2024-08-05 ENCOUNTER — NON-APPOINTMENT (OUTPATIENT)
Age: 69
End: 2024-08-05

## 2024-08-05 LAB
ALBUMIN SERPL ELPH-MCNC: 4.5 G/DL
ANION GAP SERPL CALC-SCNC: 14 MMOL/L
APPEARANCE: CLEAR
BACTERIA: NEGATIVE /HPF
BASOPHILS # BLD AUTO: 0.03 K/UL
BASOPHILS NFR BLD AUTO: 0.3 %
BILIRUBIN URINE: NEGATIVE
BLOOD URINE: NEGATIVE
BUN SERPL-MCNC: 24 MG/DL
CALCIUM SERPL-MCNC: 9.3 MG/DL
CAST: 0 /LPF
CHLORIDE SERPL-SCNC: 104 MMOL/L
CO2 SERPL-SCNC: 26 MMOL/L
COLOR: YELLOW
CREAT SERPL-MCNC: 1.37 MG/DL
EGFR: 56 ML/MIN/1.73M2
EOSINOPHIL # BLD AUTO: 0.21 K/UL
EOSINOPHIL NFR BLD AUTO: 2.4 %
EPITHELIAL CELLS: 0 /HPF
GLUCOSE QUALITATIVE U: 500 MG/DL
GLUCOSE SERPL-MCNC: 125 MG/DL
HCT VFR BLD CALC: 33.5 %
HGB BLD-MCNC: 10.6 G/DL
IMM GRANULOCYTES NFR BLD AUTO: 0.7 %
KETONES URINE: NEGATIVE MG/DL
LEUKOCYTE ESTERASE URINE: NEGATIVE
LYMPHOCYTES # BLD AUTO: 2.03 K/UL
LYMPHOCYTES NFR BLD AUTO: 23 %
MAN DIFF?: NORMAL
MCHC RBC-ENTMCNC: 29.2 PG
MCHC RBC-ENTMCNC: 31.6 GM/DL
MCV RBC AUTO: 92.3 FL
MICROSCOPIC-UA: NORMAL
MONOCYTES # BLD AUTO: 0.9 K/UL
MONOCYTES NFR BLD AUTO: 10.2 %
NEUTROPHILS # BLD AUTO: 5.6 K/UL
NEUTROPHILS NFR BLD AUTO: 63.4 %
NITRITE URINE: NEGATIVE
PH URINE: 5.5
PHOSPHATE SERPL-MCNC: 3.5 MG/DL
PLATELET # BLD AUTO: 317 K/UL
POTASSIUM SERPL-SCNC: 4.9 MMOL/L
PROTEIN URINE: 30 MG/DL
RBC # BLD: 3.63 M/UL
RBC # FLD: 13.8 %
RED BLOOD CELLS URINE: 1 /HPF
REVIEW: NORMAL
SODIUM SERPL-SCNC: 143 MMOL/L
SPECIFIC GRAVITY URINE: 1.02
SPERM-LIKE CELLS: PRESENT
TACROLIMUS SERPL-MCNC: 6.5 NG/ML
UROBILINOGEN URINE: 0.2 MG/DL
WBC # FLD AUTO: 8.83 K/UL
WHITE BLOOD CELLS URINE: 1 /HPF

## 2024-08-14 ENCOUNTER — RX RENEWAL (OUTPATIENT)
Age: 69
End: 2024-08-14

## 2024-09-02 ENCOUNTER — RX RENEWAL (OUTPATIENT)
Age: 69
End: 2024-09-02

## 2024-09-18 ENCOUNTER — RX RENEWAL (OUTPATIENT)
Age: 69
End: 2024-09-18

## 2024-09-20 ENCOUNTER — APPOINTMENT (OUTPATIENT)
Dept: TRANSPLANT | Facility: CLINIC | Age: 69
End: 2024-09-20

## 2024-09-23 ENCOUNTER — APPOINTMENT (OUTPATIENT)
Dept: NEPHROLOGY | Facility: CLINIC | Age: 69
End: 2024-09-23
Payer: MEDICARE

## 2024-09-23 VITALS
HEIGHT: 62 IN | SYSTOLIC BLOOD PRESSURE: 156 MMHG | WEIGHT: 138 LBS | DIASTOLIC BLOOD PRESSURE: 73 MMHG | OXYGEN SATURATION: 99 % | BODY MASS INDEX: 25.4 KG/M2 | HEART RATE: 80 BPM | RESPIRATION RATE: 16 BRPM

## 2024-09-23 DIAGNOSIS — I10 ESSENTIAL (PRIMARY) HYPERTENSION: ICD-10-CM

## 2024-09-23 DIAGNOSIS — Z79.899 OTHER LONG TERM (CURRENT) DRUG THERAPY: ICD-10-CM

## 2024-09-23 DIAGNOSIS — Z94.0 KIDNEY TRANSPLANT STATUS: ICD-10-CM

## 2024-09-23 LAB
ALBUMIN SERPL ELPH-MCNC: 4.6 G/DL
ALP BLD-CCNC: 125 U/L
ALT SERPL-CCNC: 8 U/L
ANION GAP SERPL CALC-SCNC: 12 MMOL/L
APPEARANCE: CLEAR
AST SERPL-CCNC: 13 U/L
BACTERIA: NEGATIVE /HPF
BASOPHILS # BLD AUTO: 0.02 K/UL
BASOPHILS NFR BLD AUTO: 0.2 %
BILIRUB SERPL-MCNC: 0.3 MG/DL
BILIRUBIN URINE: NEGATIVE
BKV DNA SPEC QL NAA+PROBE: NOT DETECTED IU/ML
BLOOD URINE: NEGATIVE
BUN SERPL-MCNC: 28 MG/DL
CALCIUM SERPL-MCNC: 9.2 MG/DL
CALCIUM SERPL-MCNC: 9.2 MG/DL
CAST: 2 /LPF
CHLORIDE SERPL-SCNC: 106 MMOL/L
CMV DNA SPEC QL NAA+PROBE: NOT DETECTED IU/ML
CMVPCR LOG: NOT DETECTED LOG10IU/ML
CO2 SERPL-SCNC: 25 MMOL/L
COLOR: YELLOW
CREAT SERPL-MCNC: 1.77 MG/DL
CREAT SPEC-SCNC: 84 MG/DL
CREAT/PROT UR: 0.5 RATIO
EGFR: 41 ML/MIN/1.73M2
EOSINOPHIL # BLD AUTO: 0.16 K/UL
EOSINOPHIL NFR BLD AUTO: 2 %
EPITHELIAL CELLS: 0 /HPF
GLUCOSE QUALITATIVE U: >=1000 MG/DL
GLUCOSE SERPL-MCNC: 172 MG/DL
HCT VFR BLD CALC: 35.5 %
HGB BLD-MCNC: 11.3 G/DL
IMM GRANULOCYTES NFR BLD AUTO: 2.2 %
KETONES URINE: NEGATIVE MG/DL
LEUKOCYTE ESTERASE URINE: NEGATIVE
LYMPHOCYTES # BLD AUTO: 1.83 K/UL
LYMPHOCYTES NFR BLD AUTO: 22.7 %
MAGNESIUM SERPL-MCNC: 2 MG/DL
MAN DIFF?: NORMAL
MCHC RBC-ENTMCNC: 29.9 PG
MCHC RBC-ENTMCNC: 31.8 GM/DL
MCV RBC AUTO: 93.9 FL
MICROSCOPIC-UA: NORMAL
MONOCYTES # BLD AUTO: 0.82 K/UL
MONOCYTES NFR BLD AUTO: 10.2 %
NEUTROPHILS # BLD AUTO: 5.04 K/UL
NEUTROPHILS NFR BLD AUTO: 62.7 %
NITRITE URINE: NEGATIVE
PARATHYROID HORMONE INTACT: 94 PG/ML
PH URINE: 5.5
PHOSPHATE SERPL-MCNC: 3.7 MG/DL
PLATELET # BLD AUTO: 295 K/UL
POTASSIUM SERPL-SCNC: 5 MMOL/L
PROT SERPL-MCNC: 6.9 G/DL
PROT UR-MCNC: 39 MG/DL
PROTEIN URINE: 30 MG/DL
RBC # BLD: 3.78 M/UL
RBC # FLD: 14.2 %
RED BLOOD CELLS URINE: 1 /HPF
SODIUM SERPL-SCNC: 142 MMOL/L
SPECIFIC GRAVITY URINE: 1.02
TACROLIMUS SERPL-MCNC: 7.3 NG/ML
UROBILINOGEN URINE: 0.2 MG/DL
WBC # FLD AUTO: 8.05 K/UL
WHITE BLOOD CELLS URINE: 0 /HPF

## 2024-09-23 PROCEDURE — 99214 OFFICE O/P EST MOD 30 MIN: CPT

## 2024-09-23 RX ORDER — ATOVAQUONE AND PROGUANIL HYDROCHLORIDE 250; 100 MG/1; MG/1
250-100 TABLET, FILM COATED ORAL
Qty: 90 | Refills: 0 | Status: ACTIVE | COMMUNITY
Start: 2024-09-23 | End: 1900-01-01

## 2024-09-23 NOTE — PHYSICAL EXAM
[General Appearance - Alert] : alert [General Appearance - In No Acute Distress] : in no acute distress [General Appearance - Well Nourished] : well nourished [General Appearance - Well Developed] : well developed [Sclera] : the sclera and conjunctiva were normal [Extraocular Movements] : extraocular movements were intact [Strabismus] : no strabismus was seen [Outer Ear] : the ears and nose were normal in appearance [Hearing Threshold Finger Rub Not [place]] : hearing was normal [Nasal Cavity] : the nasal mucosa and septum were normal [Neck Appearance] : the appearance of the neck was normal [Neck Cervical Mass (___cm)] : no neck mass was observed [Respiration, Rhythm And Depth] : normal respiratory rhythm and effort [Exaggerated Use Of Accessory Muscles For Inspiration] : no accessory muscle use [Heart Rate And Rhythm] : heart rate was normal and rhythm regular [Heart Sounds] : normal S1 and S2 [Heart Sounds Gallop] : no gallops [Edema] : there was no peripheral edema [Bowel Sounds] : normal bowel sounds [Abdomen Soft] : soft [Abdomen Tenderness] : non-tender [Cervical Lymph Nodes Enlarged Posterior Bilaterally] : posterior cervical [Cervical Lymph Nodes Enlarged Anterior Bilaterally] : anterior cervical [Supraclavicular Lymph Nodes Enlarged Bilaterally] : supraclavicular [Abnormal Walk] : normal gait [Nail Clubbing] : no clubbing  or cyanosis of the fingernails [Musculoskeletal - Swelling] : no joint swelling seen [Skin Color & Pigmentation] : normal skin color and pigmentation [Skin Turgor] : normal skin turgor [] : no rash [Cranial Nerves] : cranial nerves 2-12 were intact [No Focal Deficits] : no focal deficits [Oriented To Time, Place, And Person] : oriented to person, place, and time [Impaired Insight] : insight and judgment were intact [Affect] : the affect was normal

## 2024-09-23 NOTE — PLAN
[FreeTextEntry1] : 1.s/p DDRT on 5/24/23- Had slow graft function from ATN which has resolved. Creatinine up to 1.7.  Will have pt hydrate and reduce tac dose.   2. IS meds- Simulect Induction.  Dosing tac by level. goal level 4-6 - reduce Envarsus to 5mgs daily.    Cont MMF 500mgs BID and continue pred 5.  3. HTN - pressure reasonably controlled.   4. Anemia - stable 5. Diabetes 2 - Blood glucose improved on Metformin.   6.  Depression - back on prozac.  7.  Travel - plans to travel to [facility] - will start Malarone.    Pt will return to see Dr. Yoder.   Repeat labs in 1 week (Saturday 10/28 in [place]) Going to [facility])

## 2024-09-23 NOTE — HISTORY OF PRESENT ILLNESS
[ Donor] :  donor [Basiliximab] : basiliximab [Steroids] : steroids [Negative/Positive] : Donor Negative/Recipient Positive [] : Yes [Terminal Creatinine: ____] : Terminal Creatinine: [unfilled] [KDPI: ____] : Kidney Donor Profile Index: [unfilled] [TextBox_7] : 5/25/23 [FreeTextEntry4] : 68 year old male with  ESRD on HD, DM, HTN, and anemia s/p kidney transplantation. \par  Donor- KDPI 96%, Terminal Cr 0.8\par  Last IHD was on 5/24/23 [de-identified] : Pt feels well.  Here with his daughter.  Plans to travel to [place] next week ([facility]).  Creatinine up to 1.7, tacro 7.4.  Feels tired after meds.

## 2024-10-01 ENCOUNTER — NON-APPOINTMENT (OUTPATIENT)
Age: 69
End: 2024-10-01

## 2024-10-01 LAB
ALBUMIN SERPL ELPH-MCNC: 4.6 G/DL
ALP BLD-CCNC: 93 U/L
ALT SERPL-CCNC: 7 U/L
ANION GAP SERPL CALC-SCNC: 12 MMOL/L
APPEARANCE: CLEAR
AST SERPL-CCNC: 15 U/L
BACTERIA: NEGATIVE /HPF
BASOPHILS # BLD AUTO: 0.04 K/UL
BASOPHILS NFR BLD AUTO: 0.5 %
BILIRUB SERPL-MCNC: 0.3 MG/DL
BILIRUBIN URINE: NEGATIVE
BLOOD URINE: NEGATIVE
BUN SERPL-MCNC: 23 MG/DL
CALCIUM OXALATE CRYSTALS: PRESENT
CALCIUM SERPL-MCNC: 9.4 MG/DL
CALCIUM SERPL-MCNC: 9.4 MG/DL
CAST: 2 /LPF
CHLORIDE SERPL-SCNC: 106 MMOL/L
CO2 SERPL-SCNC: 24 MMOL/L
COLOR: YELLOW
CREAT SERPL-MCNC: 1.48 MG/DL
CREAT SPEC-SCNC: 73 MG/DL
CREAT/PROT UR: 0.4 RATIO
EGFR: 51 ML/MIN/1.73M2
EOSINOPHIL # BLD AUTO: 0.21 K/UL
EOSINOPHIL NFR BLD AUTO: 2.5 %
EPITHELIAL CELLS: 0 /HPF
GLUCOSE QUALITATIVE U: 250 MG/DL
GLUCOSE SERPL-MCNC: 127 MG/DL
HCT VFR BLD CALC: 33.1 %
HGB BLD-MCNC: 10.8 G/DL
IMM GRANULOCYTES NFR BLD AUTO: 2.3 %
KETONES URINE: NEGATIVE MG/DL
LEUKOCYTE ESTERASE URINE: NEGATIVE
LYMPHOCYTES # BLD AUTO: 1.8 K/UL
LYMPHOCYTES NFR BLD AUTO: 21.1 %
MAGNESIUM SERPL-MCNC: 1.8 MG/DL
MAN DIFF?: NORMAL
MCHC RBC-ENTMCNC: 30.8 PG
MCHC RBC-ENTMCNC: 32.6 GM/DL
MCV RBC AUTO: 94.3 FL
MICROSCOPIC-UA: NORMAL
MONOCYTES # BLD AUTO: 0.78 K/UL
MONOCYTES NFR BLD AUTO: 9.2 %
NEUTROPHILS # BLD AUTO: 5.49 K/UL
NEUTROPHILS NFR BLD AUTO: 64.4 %
NITRITE URINE: NEGATIVE
PARATHYROID HORMONE INTACT: 69 PG/ML
PH URINE: 5.5
PHOSPHATE SERPL-MCNC: 3.5 MG/DL
PLATELET # BLD AUTO: 291 K/UL
POTASSIUM SERPL-SCNC: 5 MMOL/L
PROT SERPL-MCNC: 6.8 G/DL
PROT UR-MCNC: 30 MG/DL
PROTEIN URINE: 30 MG/DL
RBC # BLD: 3.51 M/UL
RBC # FLD: 14.2 %
RED BLOOD CELLS URINE: NORMAL /HPF
REVIEW: NORMAL
SODIUM SERPL-SCNC: 142 MMOL/L
SPECIFIC GRAVITY URINE: 1.02
TACROLIMUS SERPL-MCNC: 4.9 NG/ML
UROBILINOGEN URINE: 0.2 MG/DL
WBC # FLD AUTO: 8.52 K/UL
WHITE BLOOD CELLS URINE: 0 /HPF

## 2025-01-07 ENCOUNTER — APPOINTMENT (OUTPATIENT)
Dept: TRANSPLANT | Facility: CLINIC | Age: 70
End: 2025-01-07

## 2025-01-13 LAB
ALBUMIN SERPL ELPH-MCNC: 4.6 G/DL
ALP BLD-CCNC: 139 U/L
ALT SERPL-CCNC: 9 U/L
ANION GAP SERPL CALC-SCNC: 14 MMOL/L
APPEARANCE: CLEAR
AST SERPL-CCNC: 9 U/L
BACTERIA: NEGATIVE /HPF
BASOPHILS # BLD AUTO: 0.05 K/UL
BASOPHILS NFR BLD AUTO: 0.5 %
BILIRUB SERPL-MCNC: 0.3 MG/DL
BILIRUBIN URINE: NEGATIVE
BKV DNA SPEC QL NAA+PROBE: NOT DETECTED IU/ML
BLOOD URINE: NEGATIVE
BUN SERPL-MCNC: 29 MG/DL
CALCIUM SERPL-MCNC: 9.5 MG/DL
CALCIUM SERPL-MCNC: 9.5 MG/DL
CAST: 8 /LPF
CHLORIDE SERPL-SCNC: 106 MMOL/L
CMV DNA SPEC QL NAA+PROBE: NOT DETECTED IU/ML
CMVPCR LOG: NOT DETECTED LOG10IU/ML
CO2 SERPL-SCNC: 20 MMOL/L
COLOR: YELLOW
CREAT SERPL-MCNC: 1.51 MG/DL
CREAT SPEC-SCNC: 82 MG/DL
CREAT/PROT UR: 0.4 RATIO
EGFR: 50 ML/MIN/1.73M2
EOSINOPHIL # BLD AUTO: 0.12 K/UL
EOSINOPHIL NFR BLD AUTO: 1.3 %
EPITHELIAL CELLS: 1 /HPF
GLUCOSE QUALITATIVE U: >=1000 MG/DL
GLUCOSE SERPL-MCNC: 173 MG/DL
HCT VFR BLD CALC: 36.5 %
HGB BLD-MCNC: 11.4 G/DL
HYALINE CASTS: PRESENT
IMM GRANULOCYTES NFR BLD AUTO: 0.7 %
KETONES URINE: NEGATIVE MG/DL
LEUKOCYTE ESTERASE URINE: NEGATIVE
LYMPHOCYTES # BLD AUTO: 1.97 K/UL
LYMPHOCYTES NFR BLD AUTO: 21.1 %
MAGNESIUM SERPL-MCNC: 1.7 MG/DL
MAN DIFF?: NORMAL
MCHC RBC-ENTMCNC: 29.7 PG
MCHC RBC-ENTMCNC: 31.2 G/DL
MCV RBC AUTO: 95.1 FL
MICROSCOPIC-UA: NORMAL
MONOCYTES # BLD AUTO: 0.94 K/UL
MONOCYTES NFR BLD AUTO: 10.1 %
NEUTROPHILS # BLD AUTO: 6.2 K/UL
NEUTROPHILS NFR BLD AUTO: 66.3 %
NITRITE URINE: NEGATIVE
PARATHYROID HORMONE INTACT: 121 PG/ML
PH URINE: 5.5
PHOSPHATE SERPL-MCNC: 3.7 MG/DL
PLATELET # BLD AUTO: 315 K/UL
POTASSIUM SERPL-SCNC: 6 MMOL/L
PROT SERPL-MCNC: 7.1 G/DL
PROT UR-MCNC: 31 MG/DL
PROTEIN URINE: 30 MG/DL
RBC # BLD: 3.84 M/UL
RBC # FLD: 14.1 %
RED BLOOD CELLS URINE: 0 /HPF
REVIEW: NORMAL
SODIUM SERPL-SCNC: 140 MMOL/L
SPECIFIC GRAVITY URINE: 1.02
TACROLIMUS SERPL-MCNC: 8.3 NG/ML
UROBILINOGEN URINE: 0.2 MG/DL
WBC # FLD AUTO: 9.35 K/UL
WHITE BLOOD CELLS URINE: 0 /HPF

## 2025-01-21 ENCOUNTER — NON-APPOINTMENT (OUTPATIENT)
Age: 70
End: 2025-01-21

## 2025-01-21 LAB
ALBUMIN SERPL ELPH-MCNC: 4.2 G/DL
ANION GAP SERPL CALC-SCNC: 11 MMOL/L
BUN SERPL-MCNC: 30 MG/DL
CALCIUM SERPL-MCNC: 9.4 MG/DL
CHLORIDE SERPL-SCNC: 107 MMOL/L
CO2 SERPL-SCNC: 26 MMOL/L
CREAT SERPL-MCNC: 1.39 MG/DL
EGFR: 55 ML/MIN/1.73M2
GLUCOSE SERPL-MCNC: 188 MG/DL
PHOSPHATE SERPL-MCNC: 3.4 MG/DL
POTASSIUM SERPL-SCNC: 5 MMOL/L
SODIUM SERPL-SCNC: 145 MMOL/L
TACROLIMUS SERPL-MCNC: 7.1 NG/ML

## 2025-01-30 ENCOUNTER — RX RENEWAL (OUTPATIENT)
Age: 70
End: 2025-01-30

## 2025-02-01 ENCOUNTER — TRANSCRIPTION ENCOUNTER (OUTPATIENT)
Age: 70
End: 2025-02-01

## 2025-02-03 ENCOUNTER — RESULT REVIEW (OUTPATIENT)
Age: 70
End: 2025-02-03

## 2025-02-10 ENCOUNTER — TRANSCRIPTION ENCOUNTER (OUTPATIENT)
Age: 70
End: 2025-02-10

## (undated) DEVICE — DRAPE EQUIPMENT BANDED BAG 30 X 30" (SHOWER CAP)

## (undated) DEVICE — BLADE SCALPEL SAFETYLOCK #11

## (undated) DEVICE — FOLEY TRAY 16FR 5CC LTX UMETER CLOSED

## (undated) DEVICE — PREP CHLORAPREP HI-LITE ORANGE 26ML

## (undated) DEVICE — ELCTR BOVIE TIP BLADE INSULATED 6.5" EDGE

## (undated) DEVICE — GLV 6.5 PROTEXIS (WHITE)

## (undated) DEVICE — SUT BIOSYN 4-0 18" P-12

## (undated) DEVICE — Device

## (undated) DEVICE — PACK BASIN SPECIAL PROCEDURE

## (undated) DEVICE — TUBING TUR 2 PRONG

## (undated) DEVICE — DRSG DERMABOND 0.7ML

## (undated) DEVICE — SOL IRR POUR H2O 250ML

## (undated) DEVICE — DRAIN CHANNEL 19FR ROUND FULL FLUTED

## (undated) DEVICE — DRAPE MAYO STAND 30"

## (undated) DEVICE — GLV 7 PROTEXIS (WHITE)

## (undated) DEVICE — WARMING BLANKET UPPER ADULT

## (undated) DEVICE — SUT SOFSILK 4-0 18" V-20

## (undated) DEVICE — DRSG SURG OPSITE 10X4"

## (undated) DEVICE — SOL IRR POUR NS 0.9% 500ML

## (undated) DEVICE — DRAPE ISOLATION BAG 20X20"

## (undated) DEVICE — ACMI SELF-SEALING SEAL UP TO 7FR

## (undated) DEVICE — SUT SOFSILK 4-0 18" TIES

## (undated) DEVICE — SUT SOFSILK 2-0 18" V-20 (POP-OFF)

## (undated) DEVICE — GLV 7.5 PROTEXIS (WHITE)

## (undated) DEVICE — SOL IRR BAG H2O 3000ML

## (undated) DEVICE — DRAPE TOWEL BLUE 17" X 24"

## (undated) DEVICE — PACK CYSTO

## (undated) DEVICE — SOL IRR POUR H2O 1500ML

## (undated) DEVICE — DRAPE 1/2 SHEET 40X57"

## (undated) DEVICE — NDL COUNTER FOAM AND MAGNET 40-70

## (undated) DEVICE — VESSEL LOOP MAXI-RED  0.120" X 16"

## (undated) DEVICE — GOWN XL EXTRA LONG

## (undated) DEVICE — DRAPE IOBAN 23" X 23"

## (undated) DEVICE — GOWN TRIMAX LG

## (undated) DEVICE — ELCTR BOVIE PENCIL SMOKE EVACUATION

## (undated) DEVICE — DRAPE SLUSH / WARMER 44 X 66"

## (undated) DEVICE — SUT BOOT STANDARD (ASSORTED) 5 PAIR

## (undated) DEVICE — SUT SOFSILK 2-0 18" TIES

## (undated) DEVICE — SYR LUER LOK 10CC

## (undated) DEVICE — NSA-STRYKER VIDEO TOWER: Type: DURABLE MEDICAL EQUIPMENT

## (undated) DEVICE — SUT PROLENE 7-0 30" C-1

## (undated) DEVICE — DRAIN RESERVOIR FOR JACKSON PRATT 100CC CARDINAL

## (undated) DEVICE — GLV 8 PROTEXIS ORTHO (CREAM)

## (undated) DEVICE — POSITIONER FOAM EGG CRATE ULNAR 2PCS (PINK)

## (undated) DEVICE — DRSG TAPE MEDIPORE 3"

## (undated) DEVICE — WARMING BLANKET LOWER ADULT

## (undated) DEVICE — DRSG CURITY GAUZE SPONGE 4 X 4" 12-PLY

## (undated) DEVICE — STOPCOCK 4-WAY W SWIVEL MALE LUER LOCK NON VENTED RED CAP

## (undated) DEVICE — HEMOSTSASIS VALVE Y SEAL ACCESSPLUS LG BORE

## (undated) DEVICE — GLV 8 PROTEXIS (CREAM) NEU-THERA

## (undated) DEVICE — SPECIMEN CONTAINER 100ML

## (undated) DEVICE — BAG DECANTER DISP

## (undated) DEVICE — SUT PDS II 5-0 27" RB-1

## (undated) DEVICE — CATH IV SAFE INSYTE 16G X 1.16" (GRAY)

## (undated) DEVICE — ELCTR BUGBEE FULGATING 5FR X 58CM

## (undated) DEVICE — SUT PDS II PLUS 4-0 27" SH

## (undated) DEVICE — DRSG TELFA 3 X 8

## (undated) DEVICE — ELCTR BOVIE TIP BLADE INSULATED 2.75" EDGE

## (undated) DEVICE — SYR LUER LOK 20CC

## (undated) DEVICE — POSITIONER FOAM HEADREST (PINK)

## (undated) DEVICE — TUBING TRUWAVE PRESSURE MALE/FEMALE 72"

## (undated) DEVICE — SUT POLYSORB 3-0 30" V-20 UNDYED

## (undated) DEVICE — PUNCH VASC STD 7.75" HANDLE 4.8MM DISP

## (undated) DEVICE — VENODYNE/SCD SLEEVE CALF LARGE

## (undated) DEVICE — PACK MAJOR ABDOMINAL SUPINE

## (undated) DEVICE — CLAMP ALLIGATOR (SINGLE JAW) 3FR X 65CM

## (undated) DEVICE — BAG URINE W METER 2L

## (undated) DEVICE — SUT ETHIBOND 5 30" LR

## (undated) DEVICE — SYR LUER LOK 50CC

## (undated) DEVICE — SUT PROLENE 6-0 4-30" C-1

## (undated) DEVICE — DRAPE 3/4 SHEET W REINFORCEMENT 56X77"